# Patient Record
Sex: FEMALE | Race: BLACK OR AFRICAN AMERICAN | NOT HISPANIC OR LATINO | Employment: UNEMPLOYED | URBAN - METROPOLITAN AREA
[De-identification: names, ages, dates, MRNs, and addresses within clinical notes are randomized per-mention and may not be internally consistent; named-entity substitution may affect disease eponyms.]

---

## 2017-03-08 LAB
EXTERNAL ABO GROUPING: NORMAL
EXTERNAL ANTIBODY SCREEN: NORMAL
EXTERNAL RH FACTOR: POSITIVE

## 2017-06-05 LAB
EXTERNAL HEPATITIS B SURFACE ANTIGEN: NEGATIVE
EXTERNAL RUBELLA IGG QUANTITATION: NORMAL
EXTERNAL SYPHILIS RPR SCREEN: NORMAL

## 2017-06-06 LAB — EXTERNAL SYPHILIS RPR SCREEN: NORMAL

## 2017-09-07 LAB — EXTERNAL HIV-1 ANTIBODY: NON REACTIVE

## 2017-10-05 LAB — EXTERNAL SYPHILIS RPR SCREEN: NORMAL

## 2017-10-20 ENCOUNTER — HOSPITAL ENCOUNTER (INPATIENT)
Facility: HOSPITAL | Age: 27
LOS: 3 days | Discharge: HOME | DRG: 540 | End: 2017-10-23
Attending: OBSTETRICS & GYNECOLOGY | Admitting: OBSTETRICS & GYNECOLOGY
Payer: COMMERCIAL

## 2017-10-20 ENCOUNTER — ANESTHESIA EVENT (INPATIENT)
Dept: LABOR AND DELIVERY | Facility: HOSPITAL | Age: 27
DRG: 540 | End: 2017-10-20
Payer: COMMERCIAL

## 2017-10-20 DIAGNOSIS — Z3A.38 38 WEEKS GESTATION OF PREGNANCY: Primary | ICD-10-CM

## 2017-10-20 PROBLEM — Z3A.39 39 WEEKS GESTATION OF PREGNANCY: Status: ACTIVE | Noted: 2017-10-20

## 2017-10-20 LAB
ABO GROUP BLD: NORMAL
BASOPHILS # BLD AUTO: 0.02 THOUSANDS/ΜL (ref 0–0.1)
BASOPHILS NFR BLD AUTO: 0 % (ref 0–1)
BLD GP AB SCN SERPL QL: NEGATIVE
EOSINOPHIL # BLD AUTO: 0.06 THOUSAND/ΜL (ref 0–0.61)
EOSINOPHIL NFR BLD AUTO: 1 % (ref 0–6)
ERYTHROCYTE [DISTWIDTH] IN BLOOD BY AUTOMATED COUNT: 15.6 % (ref 11.6–15.1)
EXTERNAL GROUP B STREP ANTIGEN: NORMAL
HCT VFR BLD AUTO: 29.4 % (ref 34.8–46.1)
HGB BLD-MCNC: 9 G/DL (ref 11.5–15.4)
LYMPHOCYTES # BLD AUTO: 2.35 THOUSANDS/ΜL (ref 0.6–4.47)
LYMPHOCYTES NFR BLD AUTO: 28 % (ref 14–44)
MCH RBC QN AUTO: 25.8 PG (ref 26.8–34.3)
MCHC RBC AUTO-ENTMCNC: 30.6 G/DL (ref 31.4–37.4)
MCV RBC AUTO: 84 FL (ref 82–98)
MONOCYTES # BLD AUTO: 0.55 THOUSAND/ΜL (ref 0.17–1.22)
MONOCYTES NFR BLD AUTO: 7 % (ref 4–12)
NEUTROPHILS # BLD AUTO: 5.44 THOUSANDS/ΜL (ref 1.85–7.62)
NEUTS SEG NFR BLD AUTO: 64 % (ref 43–75)
NRBC BLD AUTO-RTO: 0 /100 WBCS
PLATELET # BLD AUTO: 239 THOUSANDS/UL (ref 149–390)
PMV BLD AUTO: 11.4 FL (ref 8.9–12.7)
RBC # BLD AUTO: 3.49 MILLION/UL (ref 3.81–5.12)
RH BLD: POSITIVE
SPECIMEN EXPIRATION DATE: NORMAL
WBC # BLD AUTO: 8.45 THOUSAND/UL (ref 4.31–10.16)

## 2017-10-20 PROCEDURE — 86850 RBC ANTIBODY SCREEN: CPT | Performed by: OBSTETRICS & GYNECOLOGY

## 2017-10-20 PROCEDURE — 86900 BLOOD TYPING SEROLOGIC ABO: CPT | Performed by: OBSTETRICS & GYNECOLOGY

## 2017-10-20 PROCEDURE — 86901 BLOOD TYPING SEROLOGIC RH(D): CPT | Performed by: OBSTETRICS & GYNECOLOGY

## 2017-10-20 PROCEDURE — 86923 COMPATIBILITY TEST ELECTRIC: CPT

## 2017-10-20 PROCEDURE — 85025 COMPLETE CBC W/AUTO DIFF WBC: CPT | Performed by: OBSTETRICS & GYNECOLOGY

## 2017-10-20 PROCEDURE — 86592 SYPHILIS TEST NON-TREP QUAL: CPT | Performed by: OBSTETRICS & GYNECOLOGY

## 2017-10-20 RX ORDER — DIPHENHYDRAMINE HYDROCHLORIDE 50 MG/ML
25 INJECTION INTRAMUSCULAR; INTRAVENOUS EVERY 6 HOURS PRN
Status: DISCONTINUED | OUTPATIENT
Start: 2017-10-20 | End: 2017-10-21

## 2017-10-20 RX ORDER — ONDANSETRON 2 MG/ML
4 INJECTION INTRAMUSCULAR; INTRAVENOUS EVERY 4 HOURS PRN
Status: DISCONTINUED | OUTPATIENT
Start: 2017-10-20 | End: 2017-10-21

## 2017-10-20 RX ORDER — BUPIVACAINE HYDROCHLORIDE 2.5 MG/ML
INJECTION, SOLUTION INFILTRATION; PERINEURAL AS NEEDED
Status: DISCONTINUED | OUTPATIENT
Start: 2017-10-20 | End: 2017-10-21 | Stop reason: SURG

## 2017-10-20 RX ORDER — FENTANYL CITRATE 50 UG/ML
INJECTION, SOLUTION INTRAMUSCULAR; INTRAVENOUS AS NEEDED
Status: DISCONTINUED | OUTPATIENT
Start: 2017-10-20 | End: 2017-10-21 | Stop reason: SURG

## 2017-10-20 RX ORDER — FENTANYL CITRATE 50 UG/ML
INJECTION, SOLUTION INTRAMUSCULAR; INTRAVENOUS
Status: COMPLETED
Start: 2017-10-20 | End: 2017-10-21

## 2017-10-20 RX ORDER — NALBUPHINE HCL 10 MG/ML
10 AMPUL (ML) INJECTION ONCE
Status: COMPLETED | OUTPATIENT
Start: 2017-10-20 | End: 2017-10-20

## 2017-10-20 RX ORDER — SODIUM CHLORIDE, SODIUM LACTATE, POTASSIUM CHLORIDE, CALCIUM CHLORIDE 600; 310; 30; 20 MG/100ML; MG/100ML; MG/100ML; MG/100ML
125 INJECTION, SOLUTION INTRAVENOUS CONTINUOUS
Status: DISCONTINUED | OUTPATIENT
Start: 2017-10-20 | End: 2017-10-23 | Stop reason: HOSPADM

## 2017-10-20 RX ORDER — METOCLOPRAMIDE HYDROCHLORIDE 5 MG/ML
5 INJECTION INTRAMUSCULAR; INTRAVENOUS EVERY 6 HOURS PRN
Status: DISCONTINUED | OUTPATIENT
Start: 2017-10-20 | End: 2017-10-21

## 2017-10-20 RX ADMIN — Medication: at 23:15

## 2017-10-20 RX ADMIN — SODIUM CHLORIDE, SODIUM LACTATE, POTASSIUM CHLORIDE, AND CALCIUM CHLORIDE 999 ML/HR: .6; .31; .03; .02 INJECTION, SOLUTION INTRAVENOUS at 21:40

## 2017-10-20 RX ADMIN — FENTANYL CITRATE 10 MCG: 50 INJECTION, SOLUTION INTRAMUSCULAR; INTRAVENOUS at 23:48

## 2017-10-20 RX ADMIN — SODIUM CHLORIDE, SODIUM LACTATE, POTASSIUM CHLORIDE, AND CALCIUM CHLORIDE 125 ML/HR: .6; .31; .03; .02 INJECTION, SOLUTION INTRAVENOUS at 22:51

## 2017-10-20 RX ADMIN — NALBUPHINE HYDROCHLORIDE 10 MG: 10 INJECTION, SOLUTION INTRAMUSCULAR; INTRAVENOUS; SUBCUTANEOUS at 22:08

## 2017-10-20 RX ADMIN — BUPIVACAINE HYDROCHLORIDE 1 ML: 2.5 INJECTION, SOLUTION INFILTRATION; PERINEURAL at 23:48

## 2017-10-21 PROBLEM — Z98.891 STATUS POST PRIMARY LOW TRANSVERSE CESAREAN SECTION: Status: ACTIVE | Noted: 2017-10-20

## 2017-10-21 LAB
AMPHETAMINES SERPL QL SCN: NEGATIVE
BARBITURATES UR QL: NEGATIVE
BASE EXCESS BLDCOA CALC-SCNC: -11 MMOL/L (ref 3–11)
BASE EXCESS BLDCOV CALC-SCNC: -6.6 MMOL/L (ref 1–9)
BASOPHILS # BLD AUTO: 0.01 THOUSANDS/ΜL (ref 0–0.1)
BASOPHILS NFR BLD AUTO: 0 % (ref 0–1)
BENZODIAZ UR QL: NEGATIVE
COCAINE UR QL: NEGATIVE
EOSINOPHIL # BLD AUTO: 0.01 THOUSAND/ΜL (ref 0–0.61)
EOSINOPHIL NFR BLD AUTO: 0 % (ref 0–6)
ERYTHROCYTE [DISTWIDTH] IN BLOOD BY AUTOMATED COUNT: 15.4 % (ref 11.6–15.1)
GLUCOSE SERPL-MCNC: 71 MG/DL (ref 65–140)
HCO3 BLDCOA-SCNC: 16.8 MMOL/L (ref 17.3–27.3)
HCO3 BLDCOV-SCNC: 20.9 MMOL/L (ref 12.2–28.6)
HCT VFR BLD AUTO: 27.8 % (ref 34.8–46.1)
HGB BLD-MCNC: 8.7 G/DL (ref 11.5–15.4)
LYMPHOCYTES # BLD AUTO: 0.88 THOUSANDS/ΜL (ref 0.6–4.47)
LYMPHOCYTES NFR BLD AUTO: 7 % (ref 14–44)
MCH RBC QN AUTO: 26.2 PG (ref 26.8–34.3)
MCHC RBC AUTO-ENTMCNC: 31.3 G/DL (ref 31.4–37.4)
MCV RBC AUTO: 84 FL (ref 82–98)
METHADONE UR QL: NEGATIVE
MONOCYTES # BLD AUTO: 0.92 THOUSAND/ΜL (ref 0.17–1.22)
MONOCYTES NFR BLD AUTO: 8 % (ref 4–12)
NEUTROPHILS # BLD AUTO: 10.28 THOUSANDS/ΜL (ref 1.85–7.62)
NEUTS SEG NFR BLD AUTO: 85 % (ref 43–75)
NRBC BLD AUTO-RTO: 0 /100 WBCS
O2 CT VFR BLDCOA CALC: 3.2 ML/DL
OPIATES UR QL SCN: NEGATIVE
OXYHGB MFR BLDCOA: 17.8 %
OXYHGB MFR BLDCOV: 22.1 %
PCO2 BLDCOA: 45 MM[HG] (ref 30–60)
PCO2 BLDCOV: 50.2 MM HG (ref 27–43)
PCP UR QL: NEGATIVE
PH BLDCOA: 7.19 [PH] (ref 7.23–7.43)
PH BLDCOV: 7.24 [PH] (ref 7.19–7.49)
PLATELET # BLD AUTO: 183 THOUSANDS/UL (ref 149–390)
PMV BLD AUTO: 10.6 FL (ref 8.9–12.7)
PO2 BLDCOA: 13.9 MM HG (ref 5–25)
PO2 BLDCOV: 14.2 MM HG (ref 15–45)
RBC # BLD AUTO: 3.32 MILLION/UL (ref 3.81–5.12)
SAO2 % BLDCOV: 4 ML/DL
THC UR QL: NEGATIVE
WBC # BLD AUTO: 12.14 THOUSAND/UL (ref 4.31–10.16)

## 2017-10-21 PROCEDURE — 85025 COMPLETE CBC W/AUTO DIFF WBC: CPT | Performed by: OBSTETRICS & GYNECOLOGY

## 2017-10-21 PROCEDURE — 88307 TISSUE EXAM BY PATHOLOGIST: CPT | Performed by: OBSTETRICS & GYNECOLOGY

## 2017-10-21 PROCEDURE — 4A1HXCZ MONITORING OF PRODUCTS OF CONCEPTION, CARDIAC RATE, EXTERNAL APPROACH: ICD-10-PCS | Performed by: OBSTETRICS & GYNECOLOGY

## 2017-10-21 PROCEDURE — 82805 BLOOD GASES W/O2 SATURATION: CPT | Performed by: OBSTETRICS & GYNECOLOGY

## 2017-10-21 PROCEDURE — 82948 REAGENT STRIP/BLOOD GLUCOSE: CPT

## 2017-10-21 PROCEDURE — 80307 DRUG TEST PRSMV CHEM ANLYZR: CPT | Performed by: OBSTETRICS & GYNECOLOGY

## 2017-10-21 RX ORDER — CALCIUM CARBONATE 200(500)MG
1000 TABLET,CHEWABLE ORAL DAILY PRN
Status: DISCONTINUED | OUTPATIENT
Start: 2017-10-21 | End: 2017-10-23 | Stop reason: HOSPADM

## 2017-10-21 RX ORDER — ONDANSETRON 2 MG/ML
4 INJECTION INTRAMUSCULAR; INTRAVENOUS EVERY 8 HOURS PRN
Status: DISCONTINUED | OUTPATIENT
Start: 2017-10-22 | End: 2017-10-23 | Stop reason: HOSPADM

## 2017-10-21 RX ORDER — DIAPER,BRIEF,INFANT-TODD,DISP
1 EACH MISCELLANEOUS DAILY PRN
Status: DISCONTINUED | OUTPATIENT
Start: 2017-10-21 | End: 2017-10-23 | Stop reason: HOSPADM

## 2017-10-21 RX ORDER — MIDAZOLAM HYDROCHLORIDE 1 MG/ML
INJECTION INTRAMUSCULAR; INTRAVENOUS AS NEEDED
Status: DISCONTINUED | OUTPATIENT
Start: 2017-10-21 | End: 2017-10-21 | Stop reason: SURG

## 2017-10-21 RX ORDER — SODIUM CHLORIDE, SODIUM LACTATE, POTASSIUM CHLORIDE, CALCIUM CHLORIDE 600; 310; 30; 20 MG/100ML; MG/100ML; MG/100ML; MG/100ML
125 INJECTION, SOLUTION INTRAVENOUS CONTINUOUS
Status: DISCONTINUED | OUTPATIENT
Start: 2017-10-21 | End: 2017-10-23 | Stop reason: HOSPADM

## 2017-10-21 RX ORDER — FENTANYL CITRATE/PF 50 MCG/ML
25 SYRINGE (ML) INJECTION
Status: COMPLETED | OUTPATIENT
Start: 2017-10-21 | End: 2017-10-21

## 2017-10-21 RX ORDER — CHLOROPROCAINE HYDROCHLORIDE 30 MG/ML
INJECTION, SOLUTION EPIDURAL; INFILTRATION; INTRACAUDAL; PERINEURAL AS NEEDED
Status: DISCONTINUED | OUTPATIENT
Start: 2017-10-21 | End: 2017-10-21 | Stop reason: SURG

## 2017-10-21 RX ORDER — OXYCODONE HYDROCHLORIDE AND ACETAMINOPHEN 5; 325 MG/1; MG/1
1 TABLET ORAL EVERY 4 HOURS PRN
Status: DISCONTINUED | OUTPATIENT
Start: 2017-10-22 | End: 2017-10-23 | Stop reason: HOSPADM

## 2017-10-21 RX ORDER — OXYTOCIN/RINGER'S LACTATE 30/500 ML
PLASTIC BAG, INJECTION (ML) INTRAVENOUS
Status: COMPLETED
Start: 2017-10-21 | End: 2017-10-21

## 2017-10-21 RX ORDER — METOCLOPRAMIDE HYDROCHLORIDE 5 MG/ML
5 INJECTION INTRAMUSCULAR; INTRAVENOUS EVERY 6 HOURS PRN
Status: ACTIVE | OUTPATIENT
Start: 2017-10-21 | End: 2017-10-22

## 2017-10-21 RX ORDER — ACETAMINOPHEN 325 MG/1
650 TABLET ORAL EVERY 6 HOURS PRN
Status: DISCONTINUED | OUTPATIENT
Start: 2017-10-21 | End: 2017-10-23 | Stop reason: HOSPADM

## 2017-10-21 RX ORDER — SIMETHICONE 80 MG
80 TABLET,CHEWABLE ORAL 4 TIMES DAILY PRN
Status: DISCONTINUED | OUTPATIENT
Start: 2017-10-21 | End: 2017-10-23 | Stop reason: HOSPADM

## 2017-10-21 RX ORDER — KETOROLAC TROMETHAMINE 30 MG/ML
30 INJECTION, SOLUTION INTRAMUSCULAR; INTRAVENOUS ONCE AS NEEDED
Status: COMPLETED | OUTPATIENT
Start: 2017-10-21 | End: 2017-10-21

## 2017-10-21 RX ORDER — OXYTOCIN 10 [USP'U]/ML
INJECTION, SOLUTION INTRAMUSCULAR; INTRAVENOUS AS NEEDED
Status: DISCONTINUED | OUTPATIENT
Start: 2017-10-21 | End: 2017-10-21 | Stop reason: SURG

## 2017-10-21 RX ORDER — OXYTOCIN/RINGER'S LACTATE 30/500 ML
1-30 PLASTIC BAG, INJECTION (ML) INTRAVENOUS
Status: DISCONTINUED | OUTPATIENT
Start: 2017-10-21 | End: 2017-10-23 | Stop reason: HOSPADM

## 2017-10-21 RX ORDER — OXYCODONE HYDROCHLORIDE AND ACETAMINOPHEN 5; 325 MG/1; MG/1
2 TABLET ORAL EVERY 4 HOURS PRN
Status: DISCONTINUED | OUTPATIENT
Start: 2017-10-22 | End: 2017-10-23 | Stop reason: HOSPADM

## 2017-10-21 RX ORDER — DIPHENHYDRAMINE HYDROCHLORIDE 50 MG/ML
25 INJECTION INTRAMUSCULAR; INTRAVENOUS EVERY 6 HOURS PRN
Status: DISCONTINUED | OUTPATIENT
Start: 2017-10-21 | End: 2017-10-23 | Stop reason: HOSPADM

## 2017-10-21 RX ORDER — NALBUPHINE HCL 10 MG/ML
5 AMPUL (ML) INJECTION
Status: DISPENSED | OUTPATIENT
Start: 2017-10-21 | End: 2017-10-22

## 2017-10-21 RX ORDER — DEXAMETHASONE SODIUM PHOSPHATE 4 MG/ML
8 INJECTION, SOLUTION INTRA-ARTICULAR; INTRALESIONAL; INTRAMUSCULAR; INTRAVENOUS; SOFT TISSUE ONCE AS NEEDED
Status: ACTIVE | OUTPATIENT
Start: 2017-10-21 | End: 2017-10-22

## 2017-10-21 RX ORDER — DOCUSATE SODIUM 100 MG/1
100 CAPSULE, LIQUID FILLED ORAL 2 TIMES DAILY
Status: DISCONTINUED | OUTPATIENT
Start: 2017-10-21 | End: 2017-10-23 | Stop reason: HOSPADM

## 2017-10-21 RX ORDER — OXYTOCIN/RINGER'S LACTATE 30/500 ML
62.5 PLASTIC BAG, INJECTION (ML) INTRAVENOUS CONTINUOUS
Status: DISCONTINUED | OUTPATIENT
Start: 2017-10-21 | End: 2017-10-23 | Stop reason: HOSPADM

## 2017-10-21 RX ORDER — SODIUM CHLORIDE, SODIUM LACTATE, POTASSIUM CHLORIDE, CALCIUM CHLORIDE 600; 310; 30; 20 MG/100ML; MG/100ML; MG/100ML; MG/100ML
INJECTION, SOLUTION INTRAVENOUS CONTINUOUS PRN
Status: DISCONTINUED | OUTPATIENT
Start: 2017-10-21 | End: 2017-10-21 | Stop reason: SURG

## 2017-10-21 RX ORDER — IBUPROFEN 600 MG/1
600 TABLET ORAL EVERY 6 HOURS PRN
Status: DISCONTINUED | OUTPATIENT
Start: 2017-10-21 | End: 2017-10-23 | Stop reason: HOSPADM

## 2017-10-21 RX ORDER — TERBUTALINE SULFATE 1 MG/ML
INJECTION, SOLUTION SUBCUTANEOUS
Status: COMPLETED
Start: 2017-10-21 | End: 2017-10-21

## 2017-10-21 RX ORDER — CEFAZOLIN SODIUM 1 G/3ML
INJECTION, POWDER, FOR SOLUTION INTRAMUSCULAR; INTRAVENOUS AS NEEDED
Status: DISCONTINUED | OUTPATIENT
Start: 2017-10-21 | End: 2017-10-21 | Stop reason: SURG

## 2017-10-21 RX ORDER — MORPHINE SULFATE 1 MG/ML
INJECTION, SOLUTION EPIDURAL; INTRATHECAL; INTRAVENOUS AS NEEDED
Status: DISCONTINUED | OUTPATIENT
Start: 2017-10-21 | End: 2017-10-21 | Stop reason: SURG

## 2017-10-21 RX ORDER — KETOROLAC TROMETHAMINE 30 MG/ML
30 INJECTION, SOLUTION INTRAMUSCULAR; INTRAVENOUS EVERY 6 HOURS PRN
Status: DISPENSED | OUTPATIENT
Start: 2017-10-21 | End: 2017-10-22

## 2017-10-21 RX ORDER — ONDANSETRON 2 MG/ML
4 INJECTION INTRAMUSCULAR; INTRAVENOUS ONCE AS NEEDED
Status: DISCONTINUED | OUTPATIENT
Start: 2017-10-21 | End: 2017-10-23 | Stop reason: HOSPADM

## 2017-10-21 RX ORDER — METOCLOPRAMIDE HYDROCHLORIDE 5 MG/ML
10 INJECTION INTRAMUSCULAR; INTRAVENOUS ONCE AS NEEDED
Status: DISCONTINUED | OUTPATIENT
Start: 2017-10-21 | End: 2017-10-23 | Stop reason: HOSPADM

## 2017-10-21 RX ORDER — ONDANSETRON 2 MG/ML
4 INJECTION INTRAMUSCULAR; INTRAVENOUS EVERY 4 HOURS PRN
Status: ACTIVE | OUTPATIENT
Start: 2017-10-21 | End: 2017-10-22

## 2017-10-21 RX ADMIN — SODIUM CHLORIDE, SODIUM LACTATE, POTASSIUM CHLORIDE, AND CALCIUM CHLORIDE 125 ML/HR: .6; .31; .03; .02 INJECTION, SOLUTION INTRAVENOUS at 20:03

## 2017-10-21 RX ADMIN — TERBUTALINE SULFATE 0.25 MG: 1 INJECTION SUBCUTANEOUS at 03:02

## 2017-10-21 RX ADMIN — Medication 2 MILLI-UNITS/MIN: at 07:56

## 2017-10-21 RX ADMIN — SODIUM CHLORIDE, SODIUM LACTATE, POTASSIUM CHLORIDE, AND CALCIUM CHLORIDE: .6; .31; .03; .02 INJECTION, SOLUTION INTRAVENOUS at 12:43

## 2017-10-21 RX ADMIN — HYDROMORPHONE HYDROCHLORIDE 0.5 MG: 1 INJECTION, SOLUTION INTRAMUSCULAR; INTRAVENOUS; SUBCUTANEOUS at 17:42

## 2017-10-21 RX ADMIN — ONDANSETRON 4 MG: 2 INJECTION INTRAMUSCULAR; INTRAVENOUS at 00:33

## 2017-10-21 RX ADMIN — FENTANYL CITRATE 25 MCG: 50 INJECTION INTRAMUSCULAR; INTRAVENOUS at 14:25

## 2017-10-21 RX ADMIN — SODIUM CHLORIDE, SODIUM LACTATE, POTASSIUM CHLORIDE, AND CALCIUM CHLORIDE 125 ML/HR: .6; .31; .03; .02 INJECTION, SOLUTION INTRAVENOUS at 04:48

## 2017-10-21 RX ADMIN — MIDAZOLAM HYDROCHLORIDE 4 MG: 1 INJECTION, SOLUTION INTRAMUSCULAR; INTRAVENOUS at 12:55

## 2017-10-21 RX ADMIN — FENTANYL CITRATE 25 MCG: 50 INJECTION INTRAMUSCULAR; INTRAVENOUS at 15:12

## 2017-10-21 RX ADMIN — DIPHENHYDRAMINE HYDROCHLORIDE 25 MG: 50 INJECTION, SOLUTION INTRAMUSCULAR; INTRAVENOUS at 20:04

## 2017-10-21 RX ADMIN — CEFAZOLIN 2000 MG: 1 INJECTION, POWDER, FOR SOLUTION INTRAVENOUS at 12:50

## 2017-10-21 RX ADMIN — HYDROMORPHONE HYDROCHLORIDE 0.5 MG: 1 INJECTION, SOLUTION INTRAMUSCULAR; INTRAVENOUS; SUBCUTANEOUS at 15:32

## 2017-10-21 RX ADMIN — AZITHROMYCIN MONOHYDRATE 500 MG: 500 INJECTION, POWDER, LYOPHILIZED, FOR SOLUTION INTRAVENOUS at 12:50

## 2017-10-21 RX ADMIN — OXYTOCIN 30 UNITS: 10 INJECTION, SOLUTION INTRAMUSCULAR; INTRAVENOUS at 12:55

## 2017-10-21 RX ADMIN — CHLOROPROCAINE HYDROCHLORIDE 30 ML: 30 INJECTION, SOLUTION EPIDURAL; INFILTRATION at 12:45

## 2017-10-21 RX ADMIN — KETOROLAC TROMETHAMINE 30 MG: 30 INJECTION, SOLUTION INTRAMUSCULAR at 20:03

## 2017-10-21 RX ADMIN — MORPHINE SULFATE 3 MG: 1 INJECTION, SOLUTION EPIDURAL; INTRATHECAL; INTRAVENOUS at 13:06

## 2017-10-21 RX ADMIN — FENTANYL CITRATE 25 MCG: 50 INJECTION INTRAMUSCULAR; INTRAVENOUS at 14:54

## 2017-10-21 RX ADMIN — KETOROLAC TROMETHAMINE 30 MG: 30 INJECTION, SOLUTION INTRAMUSCULAR at 14:08

## 2017-10-21 RX ADMIN — FENTANYL CITRATE 25 MCG: 50 INJECTION INTRAMUSCULAR; INTRAVENOUS at 14:36

## 2017-10-21 RX ADMIN — NALBUPHINE HYDROCHLORIDE 5 MG: 10 INJECTION, SOLUTION INTRAMUSCULAR; INTRAVENOUS; SUBCUTANEOUS at 17:20

## 2017-10-21 RX ADMIN — METOCLOPRAMIDE 5 MG: 5 INJECTION, SOLUTION INTRAMUSCULAR; INTRAVENOUS at 02:17

## 2017-10-21 RX ADMIN — HYDROMORPHONE HYDROCHLORIDE 0.5 MG: 1 INJECTION, SOLUTION INTRAMUSCULAR; INTRAVENOUS; SUBCUTANEOUS at 22:32

## 2017-10-21 NOTE — OP NOTE
OPERATIVE REPORT  PATIENT NAME: Gabriela Molina    :  1990  MRN: [id number]  Pt Location: [facility] OR ROOM 02    SURGERY DATE: 10/21/2017    Surgeon(s) and Role:     * Suzan Contreras MD - Primary     * Manuel Lemos MD - Assisting     * Kathryn Sharp - Assisting    Preop Diagnosis:  Non-reassuring fetal heart tone    Post-Op Diagnosis Codes:     * Non-reassuring fetal status, delivered, current hospitalization [O75 89]    Procedure(s) (LRB):   SECTION () (Bilateral)    Specimen(s):  ID Type Source Tests Collected by Time Destination   1 :  Tissue (Placenta on Hold) OB Only Placenta TISSUE EXAM OB (PLACENTA) ONLY, PLACENTA IN STORAGE Suzan Contreras MD 10/21/2017 1256    A :  Cord Blood Cord BLOOD GAS, VENOUS, CORD, BLOOD GAS, ARTERIAL, CORD Suzan Contreras MD 10/21/2017 1256        Estimated Blood Loss:   800 mL    Drains:  Urethral Catheter Latex 16 Fr  (Active)   Site Assessment Clean;Skin intact 10/21/2017 12:54 PM   Collection Container Standard drainage bag 10/21/2017 12:54 PM   Securement Method Securing device (Describe) 10/21/2017  7:15 AM   Output (mL) 120 mL 10/21/2017  9:00 AM   Number of days: 0       Anesthesia Type:   * No anesthesia type entered *    Operative Indications:  Non-reassuring fetal heart tone      Operative Findings:  Viable male  @ 2927, Apgars 3/8  Spontaneous intact placenta with 3VC @ 1255  Normal uterus, right tube and ovary, left tube missing c/w prior ectopic  No intra-abdominal adhesions    Complications:   None    Procedure and Technique:    The patient was taken to the operating room where a time out was performed to confirm correct patient and correct procedure  Epidural anesthesia was adequately established  Prior to incision 2gm Ancef and 500mg Azithromycin was given for preoperative prophylaxis  The patient was then placed in a supine position with a left lateral tilt     A lion catheter had been placed during labor, urine was noted to be slightly blood tinged pre-operatively  Fetal heart tones were still non-reassuring  The patient was then prepped and draped in the usual sterile fashion  Anesthesia was tested using an allis clamp and noted to be adequate  A Pfannenstiel incision was made and carried down through the underlying subcutaneous tissue to the fascia using a scalpel  Rectus fascia was then incised  We then proceeded in Arsenio-Veras fashion  All anatomic layers were well-demarcated  The rectus muscles were  and the peritoneum was identified, entered, and extended longitudinally with blunt dissection  The bladder blade was inserted  A low transverse uterine incision was made with the scalpel and extended laterally with blunt dissection  The amnion was entered bluntly  The fetal head was palpated, elevated, and delivered through the uterine incision followed by the body without difficulty  The umbilical cord was clamped and cut  The infant was handed off to the  providers  Arterial and venous cord gases, cord blood, and a segment of umbilical cord were obtained for evaluation  The placenta delivered spontaneously with uterine fundal massage and appeared normal  The uterus was exteriorized and curretted with a moist lap sponge  The uterine incision was closed with a running locked suture of 0 Monocryl  A second layer of the same suture was used to imbricate the first   Hemostasis was noted to be excellent  Normal saline solution was used to irrigate the posterior culdesac  The uterus was returned to the abdomen  The gutters were inspected and cleared of all clots and debris with irrigation and moist lap sponges  One additional figure of eight stitch was placed in the right angle of the uterine incision using 0-vicryl  The fascia was closed with a running suture of 0 Vicryl  The skin incision was closed with 4-0 Vicryl  Good hemostasis was noted    Histocryl was  placed sterilely placed on top of the skin incision  All needle, sponge, and instrument counts were noted to be correct x 2 at the end of the procedure  The patient was then cleansed and transferred to the recovery room  Overall, the patient tolerated the procedure well and is currently in stable condition in the PACU with her   I was present and participated in the entire procedure         SIGNATURE: Kim Lopez MD  DATE: 2017  TIME: 1:47 PM

## 2017-10-21 NOTE — DISCHARGE SUMMARY
Discharge Summary - OB/GYN   Fidelia Peralta 33 y o  female MRN: [id number]  Unit/Bed#: LD PACU- Encounter: [id number]      Admission Date: 10/20/2017     Discharge Date: 10/23/17    Admitting Diagnosis:   1  Pregnancy at 39w6d  2  GBS unknown  3  Poor prenatal care  4  Primary low-transverse  section    Discharge Diagnosis:   Same, delivered      Procedures: primary  section, low transverse incision    Attending: LEWIS Leigh MD    Hospital Course:     Fidelia Peralta is a 33 y o  [id number] at 39w6d wks who was initially admitted for labor  Patient arrested dilation at 8 cm  Baby did not tolerate Pitocin to augment  Over time fetal heart tracing became persisting category 2 with repetitive deep late decelerations  So the decision was made to proceed with a primary low-transverse  section  She delivered a viable male  on 10/21/017 at   Weight 3685g via primary  section, low transverse incision  Apgars were 3 (1 min) and 8 (5 min)   was transferred to  nursery  Patient tolerated the procedure well and was transferred to recovery in stable condition  Her post-operative course was uncomplicated  Preoperative hemaglobin was 9 0, postoperative was 7 8  Her postoperative pain was well controlled with oral analgesics  On day of discharge, she was ambulating and able to reasonably perform all ADLs  She was voiding and had appropriate bowel function  Pain was well controlled  She was discharged home on post-operative day #2 without complications  Patient was instructed to follow up with her OB as an outpatient and was given appropriate warnings to call provider if she develops signs of infection or uncontrolled pain  Complications: none apparent    Condition at discharge: stable     Discharge instructions/Information to patient and family:   See after visit summary for information provided to patient and family        Provisions for Follow-Up Care:  See after visit summary for information related to follow-up care and any pertinent home health orders  Disposition: Home    Planned Readmission: No    Discharge Medications: For a complete list of the patient's medications, please refer to her med rec          Marcus Valenzuela DO  OB/GYN, PGY2  10/23/2017, 6:26 AM

## 2017-10-21 NOTE — PROGRESS NOTES
IUPC was placed as part of the huddle discussion to measure adequancy of contractions and allow for the possibility of amnioinfusion if necessary  Growth scan performed, baby measuring approximately 5 pounds 7 ounces  Patient's largest baby was 8 pounds in   Discussed the possibility of  section secondary to arrest of dilation as well as fetal intolerance to labor as we had to shut the pitocin off since baby could not tolerate a pitocin of 2mU  Will continue expectant management for now

## 2017-10-21 NOTE — ANESTHESIA PROCEDURE NOTES
CSE Block    Patient location during procedure: OB  Start time: 10/20/2017 11:48 PM  Reason for block: procedure for pain and at surgeon's request  Staffing  Anesthesiologist: Steve Strong  Performed: anesthesiologist   Preanesthetic Checklist  Completed: patient identified, site marked, surgical consent, pre-op evaluation, timeout performed, IV checked, risks and benefits discussed and monitors and equipment checked  CSE  Patient position: sitting  Prep: ChloraPrep  Patient monitoring: cardiac monitor and continuous pulse ox  Approach: midline  Spinal Needle  Needle type: pencil-tip   Needle gauge: 27 G  Needle length: 10 cm  Epidural Needle  Injection technique: EFRAIN saline  Needle type: Tuohy   Needle gauge: 18 G  Location: lumbar (1-5)  Catheter  Catheter type: side hole  Catheter size: 20 G  Catheter at skin depth: 12 cm  Test dose: negative  AssessmentInjection Assessment:  negative aspiration for heme, no paresthesia on injection, positive aspiration for clear CSF and no pain on injection

## 2017-10-21 NOTE — H&P
H&P Exam - Obstetrics   Glen Alcantara 33 y o  female MRN: [id number]  Unit/Bed#: -01 Encounter: [id number]      History of Present Illness     Chief Complaint: Active labor    HPI:  Glen Alcantara is a 33 y o  [id number] female with an VIOLET of 10/22/2017, by Patient Reported at 39w5d weeks gestation who is being admitted for Active labor  The patient presented to the emergency room by ambulance complaining of contractions  She reports she has been previously seen at his hospital and was sent home the day prior  She had no complaints of leaking of fluid vaginal bleeding  She reported good fetal movement  Patient reported that she was getting her care at [facility] in [place], [place]  She had a history of 5 previous vaginal deliveries and and exploratory laparotomy for a left ectopic pregnancy  She is GBS unknown  Contractions: every 4 minutes with increasing intensity  Leakage of fluid: None  Bleeding: None  Fetal movement: present  The patient was brought to the trauma [place] and was evaluated  She was noted to be 5 cm dilated, 70% effaced, and -2 station  She was then moved up to the labor floor she then spontaneously ruptured for clear fluid and was admitted for active labor  PREGNANCY COMPLICATIONS:  Patient unknown to the [facility]  OB History    Para Term  AB Living   9 5 5   3 5   SAB TAB Ectopic Multiple Live Births   1   1   5      # Outcome Date GA Lbr Karlos/2nd Weight Sex Delivery Anes PTL Lv   9 Current            8 Ectopic 16           7 Term 11/10/13 40w0d    Vag-Spont   AFIA   6 Term 01/13/10 40w0d    Vag-Spont   AFIA   5 Term 08 40w0d    Vag-Spont   AFIA   4 Term 10/20/06 40w0d    Vag-Spont   AFIA   3 Term      Vag-Spont   AFIA   2 AB            1 SAB               Obstetric Comments   Ex lap     Review of Systems   Constitutional: Negative for chills and fever  Respiratory: Negative for shortness of breath      Cardiovascular: Negative for chest pain  Gastrointestinal: Positive for abdominal pain  Negative for nausea and vomiting  Historical Information   Past Medical History:   Diagnosis Date    Psychiatric disorder     bipolar    Varicella     Child Hx     Past Surgical History:   Procedure Laterality Date    ME LAP,DIAGNOSTIC ABDOMEN N/A 12/6/2016    Procedure: EXPLORATORY LAPAROTOMY, LEFT SALPINGECTOMY;  Surgeon: Romana Cohn, MD;  Location: [facility];  Service: Gynecology     Social History   History   Alcohol Use No     History   Drug Use No     History   Smoking Status    Former Smoker    Packs/day: 0 00   Smokeless Tobacco    Never Used     Family History: non-contributory    Meds/Allergies      Prescriptions Prior to Admission   Medication    Citalopram Hydrobromide (CELEXA PO)    ferrous sulfate 325 (65 Fe) mg tablet    ibuprofen (MOTRIN) 600 mg tablet      No Known Allergies    OBJECTIVE:    Vitals: Blood pressure 132/70, pulse 81, temperature 98 3 °F (36 8 °C), temperature source Oral, resp  rate 20, height 5' 2" (1 575 m), weight 86 2 kg (190 lb), SpO2 100 %, currently breastfeeding  Body mass index is 34 75 kg/m²  Physical Exam   Constitutional: She is oriented to person, place, and time  HENT:   Head: Normocephalic and atraumatic  Cardiovascular: Normal rate and regular rhythm  Pulmonary/Chest: Effort normal and breath sounds normal    Abdominal: There is tenderness  There is rebound  Musculoskeletal: She exhibits no edema  Neurological: She is alert and oriented to person, place, and time  Skin: Skin is warm and dry         Cervix:   Dilation: 5  Effacement (%): 70  Station: -2  Cervical Characteristics: Soft, Anterior    Fetal heart rate:   Baseline Rate: 120 bpm  Variability: Moderate 6-25 bpm  Accelerations: 15 x 15 or greater, At variable times  Decelerations: Early  FHR Category: Category I    Hood:   Contraction Frequency (minutes): 2  Contraction Duration (seconds): 40-80  Contraction Quality: Moderate    EFW: 7lbs    GBS: unknown    Prenatal Labs: labs not available for review  Invasive Devices     Peripheral Intravenous Line            Peripheral IV 10/20/17 Left Forearm 1 day          Epidural Line            Epidural Catheter 10/20/17 less than 1 day          Drain            Urethral Catheter Latex 16 Fr  less than 1 day                  Assessment/Plan     ASSESSMENT:   28yo   at 39w5d weeks gestation in labor  PLAN:  - will admit for active labor  -we will get records from [facility]  -epidural p r n   -we will expectantly manage  -continuous external fetal monitoring  -CBC, RPR, type and screen  -we will get UDS for poor prenatal care    Discussed with Dr Mariza Bose      This patient will be an INPATIENT  and I certify the anticipated length of stay is >2 Midnights      Romayne Cargo, MD   OB/Gyn PGY-4  10/21/2017 1:42 AM

## 2017-10-21 NOTE — OB LABOR/OXYTOCIN SAFETY PROGRESS
Oxytocin Safety Progress Check Note - Bakari Colunga 33 y o  female MRN: [id number]    Unit/Bed#: -01 Encounter: [id number]    Obstetric History       T5      L5     SAB0   TAB0   Ectopic1   Multiple0   Live Births5    Obstetric Comments   Ex lap     Gestational Age: 37w11d     Contraction Frequency (minutes): 2-3  Contraction Quality: Moderate  Tachysystole: No   Dilation: 8        Effacement (%): 80  Station: -1  Baseline Rate: 125 bpm  Fetal Heart Rate: 62 BPM  FHR Category: Category I     Oxytocin Safety Progress Check: Safety check completed    Notes/comments:   Pt laying in bed, comfortable with epidural  Nurse reported she was feeling more pressure   SVE unchanged with re check in 2 hrs or if pt feeling increased pressure          Lanita Sandifer, MD 10/21/2017 4:55 AM

## 2017-10-21 NOTE — PROGRESS NOTES
Recommend 1LTCS due to fetal intolerance to labor, remote from delivery  Reviewed risks, benefits, alternatives of procedure with patient and   Patient questions answered, and she is agreement with the plan

## 2017-10-21 NOTE — ANESTHESIA POSTPROCEDURE EVALUATION
Post-Op Assessment Note      CV Status:  Stable    Mental Status:  Alert and awake    Hydration Status:  Stable    PONV Controlled:  None    Airway Patency:  Patent    Post Op Vitals Reviewed: Yes          Staff: Anesthesiologist     Post-op block assessment: site cleaned and catheter intact        BP      Temp      Pulse     Resp      SpO2

## 2017-10-21 NOTE — PROGRESS NOTES
Patient is a late transfer to care at [facility] ( in labor) and her last growth scan was done in the second trimester  We did a growth scan to evaluate if the fetus was too  Large , hence causing labor dystocia  Abd U/S: 10/21/17  1  Vertex face down  2  Loose Double nuchal cord with nml umbilical dopplers  3  FL measuring in the 19 th percentile at 7 45 cm correlates to 37w5d   4  AC 33 93 cm correlates with 38 week EGA  5  BPP and HC could not be measures as head deep into the pelvic    - Base on the above measurement and exam, we do not believed that the fetus is larger than the patient's other babies  Hernesto Benton   10/21/2017     I participated in this US procedure     Face to face and floor time 15 min    Amy Massey MD

## 2017-10-21 NOTE — PLAN OF CARE
Problem: Knowledge Deficit  Goal: Verbalizes understanding of labor plan  Assess patient/family/caregiver's baseline knowledge level and ability to understand information  Provide education via patient/family/caregiver's preferred learning method at appropriate level of understanding  1  Provide teaching at level of understanding  2  Provide teaching via preferred learning method(s)  Outcome: Progressing    Goal: Patient/family/caregiver demonstrates understanding of disease process, treatment plan, medications, and discharge instructions  Complete learning assessment and assess knowledge base    Interventions:  - Provide teaching at level of understanding  - Provide teaching via preferred learning methods   Outcome: Progressing      Problem: PAIN - ADULT  Goal: Verbalizes/displays adequate comfort level or baseline comfort level  Interventions:  - Encourage patient to monitor pain and request assistance  - Assess pain using appropriate pain scale  - Administer analgesics based on type and severity of pain and evaluate response  - Implement non-pharmacological measures as appropriate and evaluate response  - Consider cultural and social influences on pain and pain management  - Notify physician/advanced practitioner if interventions unsuccessful or patient reports new pain   Outcome: Progressing      Problem: INFECTION - ADULT  Goal: Absence or prevention of progression during hospitalization  INTERVENTIONS:  - Assess and monitor for signs and symptoms of infection  - Monitor lab/diagnostic results  - Monitor all insertion sites, i e  indwelling lines, tubes, and drains  - Monitor endotracheal (as able) and nasal secretions for changes in amount and color  - [place] appropriate cooling/warming therapies per order  - Administer medications as ordered  - Instruct and encourage patient and family to use good hand hygiene technique  - Identify and instruct in appropriate isolation precautions for identified infection/condition   Outcome: Progressing    Goal: Absence of fever/infection during neutropenic period  INTERVENTIONS:  - Monitor WBC  - Implement neutropenic guidelines   Outcome: Progressing      Problem: SAFETY ADULT  Goal: Patient will remain free of falls  INTERVENTIONS:  - Assess patient frequently for physical needs  -  Identify cognitive and physical deficits and behaviors that affect risk of falls    -  [place] fall precautions as indicated by assessment   - Educate patient/family on patient safety including physical limitations  - Instruct patient to call for assistance with activity based on assessment  - Modify environment to reduce risk of injury  - Consider OT/PT consult to assist with strengthening/mobility   Outcome: Progressing    Goal: Maintain or return to baseline ADL function  INTERVENTIONS:  -  Assess patient's ability to carry out ADLs; assess patient's baseline for ADL function and identify physical deficits which impact ability to perform ADLs (bathing, care of mouth/teeth, toileting, grooming, dressing, etc )  - Assess/evaluate cause of self-care deficits   - Assess range of motion  - Assess patient's mobility; develop plan if impaired  - Assess patient's need for assistive devices and provide as appropriate  - Encourage maximum independence but intervene and supervise when necessary  ¯ Involve family in performance of ADLs  ¯ Assess for home care needs following discharge   ¯ Request OT consult to assist with ADL evaluation and planning for discharge  ¯ Provide patient education as appropriate   Outcome: Progressing    Goal: Maintain or return mobility status to optimal level  INTERVENTIONS:  - Assess patient's baseline mobility status (ambulation, transfers, stairs, etc )    - Identify cognitive and physical deficits and behaviors that affect mobility  - Identify mobility aids required to assist with transfers and/or ambulation (gait belt, sit-to-stand, lift, walker, cane, etc )  - [place] fall precautions as indicated by assessment  - Record patient progress and toleration of activity level on Mobility SBAR; progress patient to next Phase/Stage  - Instruct patient to call for assistance with activity based on assessment  - Request Rehabilitation consult to assist with strengthening/weightbearing, etc    Outcome: Progressing      Problem: DISCHARGE PLANNING  Goal: Discharge to home or other facility with appropriate resources  INTERVENTIONS:  - Identify barriers to discharge w/patient and caregiver  - Arrange for needed discharge resources and transportation as appropriate  - Identify discharge learning needs (meds, wound care, etc )  - Arrange for interpretive services to assist at discharge as needed  - Refer to Case Management Department for coordinating discharge planning if the patient needs post-hospital services based on physician/advanced practitioner order or complex needs related to functional status, cognitive ability, or social support system   Outcome: Progressing

## 2017-10-21 NOTE — PLAN OF CARE
BIRTH - VAGINAL/ SECTION     Fetal and maternal status remain reassuring during the birth process [de-identified]     Emotionally satisfying birthing experience for mother/fetus [address] Discharge to home or other facility with appropriate resources Progressing        INFECTION - ADULT     Absence or prevention of progression during hospitalization Progressing     Absence of fever/infection during neutropenic period Progressing        Knowledge Deficit     Verbalizes understanding of labor plan Progressing     Patient/family/caregiver demonstrates understanding of disease process, treatment plan, medications, and discharge instructions Progressing        Labor & Delivery     Manages discomfort Progressing     Patient vital signs are stable Progressing        PAIN - ADULT     Verbalizes/displays adequate comfort level or baseline comfort level Progressing        SAFETY ADULT     Patient will remain free of falls Progressing     Maintain or return to baseline ADL function Progressing     Maintain or return mobility status to optimal level Progressing

## 2017-10-21 NOTE — OB LABOR/OXYTOCIN SAFETY PROGRESS
Labor Progress Note - Narayan Shelby 33 y o  female MRN: [id number]    Unit/Bed#: -01 Encounter: [id number]    Obstetric History       T5      L5     SAB0   TAB0   Ectopic1   Multiple0   Live Births5    Obstetric Comments   Ex lap     Gestational Age: 37w11d     Contraction Frequency (minutes): 5  Contraction Quality: Not applicable  Tachysystole: No   Dilation: 8        Effacement (%): 80  Station: -1  Baseline Rate: 120 bpm  Fetal Heart Rate: 62 BPM  FHR Category: Category II     Oxytocin Safety Progress Check: Safety check completed    Notes/comments:     Patient comfortable and not feeling any pressure  Cervix is unchanged, will augment with pitocin   FHT showing occasional shallow variable and early decelerations, overall reactive            Awilda Powell MD 10/21/2017 7:41 AM

## 2017-10-21 NOTE — PROGRESS NOTES
SAFETY HUDDLE    TRIGGER: Pitocin Discontinued, Recurrent Variable decels    PARTICIPANTS: Edna Walton Talmage, Male, Allen    REVIEW OF CURRENT PLAN OF CARE AND MANAGEMENT: FHT with moderate variability and accels now  Will place IUPC and perform bedside growth scan  Patient without PN care since 21 weeks, she reports this baby feels "big"    To consider retrial of pitocin augmentation v  1LTCS>    TIMELINE FOR NEXT ASSESSMENT: After growth scan    ALL PARTICIPANTS IN AGREEMENT WITH PLAN OF CARE: yes    IS PERRT REQUIRED: no

## 2017-10-21 NOTE — PLAN OF CARE
Problem: Knowledge Deficit  Goal: Verbalizes understanding of labor plan  Assess patient/family/caregiver's baseline knowledge level and ability to understand information  Provide education via patient/family/caregiver's preferred learning method at appropriate level of understanding  1  Provide teaching at level of understanding  2  Provide teaching via preferred learning method(s)  Outcome: Completed Date Met: 10/21/17    Goal: Patient/family/caregiver demonstrates understanding of disease process, treatment plan, medications, and discharge instructions  Complete learning assessment and assess knowledge base  Interventions:  - Provide teaching at level of understanding  - Provide teaching via preferred learning methods   Outcome: Completed Date Met: 10/21/17      Problem: Labor & Delivery  Goal: Manages discomfort  Assess and monitor for signs and symptoms of discomfort  Assess patient's pain level regularly and per hospital policy  Administer medications as ordered  Support use of nonpharmacological methods to help control pain such as distraction, imagery, relaxation, and application of heat and cold  Collaborate with interdisciplinary team and patient to determine appropriate pain management plan  1  Include patient in decisions related to comfort  2  Offer non-pharmacological pain management interventions  3  Report ineffective pain management to physician  Outcome: Completed Date Met: 10/21/17    Goal: Patient vital signs are stable  1  Assess vital signs - vaginal delivery     Outcome: Completed Date Met: 10/21/17      Problem: BIRTH - VAGINAL/ SECTION  Goal: Fetal and maternal status remain reassuring during the birth process  INTERVENTIONS:  - Monitor vital signs  - Monitor fetal heart rate  - Monitor uterine activity  - Monitor labor progression (vaginal delivery)  - DVT prophylaxis  - Antibiotic prophylaxis   Outcome: Completed Date Met: 10/21/17    Goal: Emotionally satisfying birthing experience for mother/fetus  Interventions:  - Assess, plan, implement and evaluate the nursing care given to the patient in labor  - Advocate the philosophy that each childbirth experience is a unique experience and support the family's chosen level of involvement and control during the labor process   - Actively participate in both the patient's and family's teaching of the birth process  - Consider cultural, Hoahaoism and age-specific factors and plan care for the patient in labor   Outcome: Completed Date Met: 10/21/17

## 2017-10-21 NOTE — OB LABOR/OXYTOCIN SAFETY PROGRESS
Labor Progress Note - Humble Garcias 33 y o  female MRN: [id number]    Unit/Bed#: -01 Encounter: [id number]    Obstetric History       T5      L5     SAB0   TAB0   Ectopic1   Multiple0   Live Births5    Obstetric Comments   Ex lap     Gestational Age: 37w11d     Contraction Frequency (minutes): 1 5-2  Contraction Quality: Moderate  Tachysystole: No   Dilation: 8        Effacement (%): 80  Station: -1  Baseline Rate: 120 bpm  Fetal Heart Rate: 62 BPM  FHR Category: Category II          Notes/comments:   Patient had prolonged deceleration for 7 minutes down to a bay of 60 beats per minute  Patient was repositioned to both left and right lateral decubitus  She was then examined and noted to be 8 cm and a large amount of fluid was lost with rupture of her 4 bag which was and did well during the cervical exam   A fetal scalp electrode was placed  Fetal heart tones had not responded to repositioning oxygen was placed and the patient was placed in all 4s  She did receive a dose of terbutaline  Heart tones then responded and return to the 120s with moderate variability  Both patient and patient's partner were informed of the deceleration and indications for potential delivery by  if this were to occur again  She was reassured that the fetal heart tones returned to a normal baseline  There is advised that we could continue expectant management of labor  She was given the opportunity ask questions        Continue expectant management  Anticipate spontaneous vaginal delivery  Dr Taryn Gallardo MD 10/21/2017 3:39 AM

## 2017-10-21 NOTE — ADDENDUM NOTE
Addendum  created 10/21/17 154 by Apurva Flores MD    Anesthesia Intra LDAs edited, LDA properties accepted

## 2017-10-21 NOTE — DISCHARGE INSTRUCTIONS
Section   WHAT YOU SHOULD KNOW:   A  delivery, or , is abdominal surgery to deliver your baby  There are many reasons you may need a   · A  may be scheduled before labor if you had a  with your last baby  It may be scheduled if your baby is not positioned normally, or you are pregnant with more than 1 baby  · Your caregiver may perform an emergency  during labor to prevent life-threatening complications for you or your baby  A  may be done if your cervix does not dilate after several hours of active labor  · Other reasons for a  include maternal infections and problems with the placenta  AFTER YOU LEAVE:   Medicines:   · Prescription pain medicine  may be given  Ask how to take this medicine safely  · Acetaminophen  decreases pain and fever  It is available without a doctor's order  Ask how much to take and how often to take it  Follow directions  Acetaminophen can cause liver damage if not taken correctly  · NSAIDs  help decrease swelling and pain or fever  This medicine is available with or without a doctor's order  NSAIDs can cause stomach bleeding or kidney problems in certain people  If you take blood thinner medicine, always ask your obstetrician if NSAIDs are safe for you  Always read the medicine label and follow directions  · Take your medicine as directed  Contact your obstetrician (OB) if you think your medicine is not helping or if you have side effects  Tell him if you are allergic to any medicine  Keep a list of the medicines, vitamins, and herbs you take  Include the amounts, and when and why you take them  Bring the list or the pill bottles to follow-up visits  Carry your medicine list with you in case of an emergency  Follow up with your OB as directed: You may need to return to have your stitches or staples removed   Write down your questions so you remember to ask them during your visits  Wound care:  Carefully wash your wound with soap and water every day  Keep your wound clean and dry  Wear loose, comfortable clothes that do not rub against your wound  Ask your OB about bathing and showering  Drink plenty of liquids: You can lower your risk for a blood clot if you drink plenty of liquids  Ask how much liquid to drink each day and which liquids are best for you  Limit activity until you have fully recovered from surgery:   · Ask when it is safe for you to drive, walk up stairs, lift heavy objects, and have sex  · Ask when it is okay to exercise, and what types of exercise to do  Start slowly and do more as you get stronger  Contact your OB if:   · You have heavy vaginal bleeding that fills 1 or more sanitary pads in 1 hour  · You have a fever  · Your incision is swollen, red, or draining pus  · You have questions or concerns about yourself or your baby  Seek care immediately or call 911 if:   · Blood soaks through your bandage  · Your stitches come apart  · You feel lightheaded, short of breath, and have chest pain  · You cough up blood  · Your arm or leg feels warm, tender, and painful  It may look swollen and red  © 2014 [address] is for End User's use only and may not be sold, redistributed or otherwise used for commercial purposes  All illustrations and images included in CareNotes® are the copyrighted property of A D A M , Inc  or [employer]am Isid[employer]  The above information is an  only  It is not intended as medical advice for individual conditions or treatments  Talk to your doctor, nurse or pharmacist before following any medical regimen to see if it is safe and effective for you

## 2017-10-21 NOTE — ANESTHESIA PREPROCEDURE EVALUATION
Review of Systems/Medical History  Patient summary reviewed  Chart reviewed      Cardiovascular  Negative cardio ROS    Pulmonary  Negative pulmonary ROS ,        GI/Hepatic  Negative GI/hepatic ROS          Negative  ROS        Endo/Other  Negative endo/other ROS      GYN  Currently pregnant , Prior pregnancy/OB history : 7+ Parity: 5,          Hematology  Negative hematology ROS      Musculoskeletal  Obesity ,        Neurology  Negative neurology ROS      Psychology   Depression , bipolar disorder,            Physical Exam    Airway    Mallampati score: II  TM Distance: >3 FB  Neck ROM: full     Dental   No notable dental hx     Cardiovascular  Comment: Negative ROS, Rhythm: regular, Rate: normal, Cardiovascular exam normal    Pulmonary  Pulmonary exam normal Breath sounds clear to auscultation,     Other Findings        Anesthesia Plan  ASA Score- 2       Anesthesia Type- epidural and spinal with ASA Monitors  Additional Monitors:   Airway Plan:           Induction-       Informed Consent- Anesthetic plan and risks discussed with patient  Recent labs personally reviewed:  Lab Results   Component Value Date    WBC 8 45 10/20/2017    HGB 9 0 (L) 10/20/2017     10/20/2017     Lab Results   Component Value Date     2016    K 3 6 2016    BUN 3 (L) 2016    CREATININE 0 77 2016    GLUCOSE 79 2016     No results found for: PTT   Lab Results   Component Value Date    INR 1 18 (H) 2016       Blood type O    No results found for: Federico Hardin MD, have personally seen and evaluated the patient prior to anesthetic care  I have reviewed the pre-anesthetic record, and other medical records if appropriate to the anesthetic care  If a CRNA is involved in the case, I have reviewed the CRNA assessment, if present, and agree   Risks/benefits and alternatives discussed with patient including possible PONV, sore throat, and possibility of rare anesthetic and surgical emergencies

## 2017-10-21 NOTE — OB LABOR/OXYTOCIN SAFETY PROGRESS
Labor Progress Note - Gabriela Jesse 33 y o  female MRN: [id number]    Unit/Bed#: -01 Encounter: [id number]    Obstetric History       T0      L5     SAB0   TAB0   Ectopic1   Multiple0   Live Births0    Obstetric Comments   Ex lap     Gestational Age: 39w0d     Contraction Frequency (minutes): 2-3  Contraction Quality: Moderate  Tachysystole: No   Dilation: 5        Effacement (%): 100  Station: -2  Baseline Rate: 120 bpm  Fetal Heart Rate: 123 BPM  FHR Category: Category I          Notes/comments:   Called by nurse that pt may have SROM with mod  Clear fluid and pt reporting feeling more pressure  SVE relatively unchanged, except completely effaced   Pt  Comfortable with epidural           Mee Guerrero MD 10/21/2017 12:15 AM

## 2017-10-21 NOTE — OB LABOR/OXYTOCIN SAFETY PROGRESS
Labor Progress Note - Sheela Parra 33 y o  female MRN: [id number]    Unit/Bed#: -01 Encounter: [id number]    Obstetric History       T5      L5     SAB0   TAB0   Ectopic1   Multiple0   Live Births5    Obstetric Comments   Ex lap     Gestational Age: 39w6d  Dose (preeti-units/min) Oxytocin: 0 preeti-units/min (Stopped per dr Crys Gupta)  Contraction Frequency (minutes): 3-4  Contraction Quality: Moderate  Tachysystole: No   Dilation: 8        Effacement (%): 80  Station: -1  Baseline Rate: 135 bpm  Fetal Heart Rate: 130 BPM     Oxytocin Safety Progress Check: Safety check completed    Notes/comments:   FHT: 135 cat 1: moderate variability, intermittent early decel  Patient had been call 8 cm on prior exam  Patient fel about 6 cm on  My exam  However she is a grandmultip and her cervix can be stretch even further  This however is a slight change from my initial check around 830 where she felt to be 5-6 cm  Patient is not making effective changes and her contraction are not adequate ( MVUs around 100s most recently)  On exam and US, fetus does not appear to be larger than her other babies  Plan: We can to restart pitocin  Patient had been rupture for hours and has made very little cervical change  If she had adequate contractions, rupture with no cervical change in 4 hours she will have arrested  With in the next 6 hours she has make no cervical changes and her contraction are inadequate,she will have also arrested  The recommendation at that point could be for a primary c/s  If there is fetal intolerance to labor with no response to resuscitation, we plan to proceed to c-birth   Risks, benefits, and alternatives discussed with patient  Questions answered  Patient is in agreement with plan  ROBERT Castro      [facility]   10/21/2017            [facility] 10/21/2017 11:17 AM

## 2017-10-22 LAB
BASOPHILS # BLD AUTO: 0.01 THOUSANDS/ΜL (ref 0–0.1)
BASOPHILS NFR BLD AUTO: 0 % (ref 0–1)
EOSINOPHIL # BLD AUTO: 0.02 THOUSAND/ΜL (ref 0–0.61)
EOSINOPHIL NFR BLD AUTO: 0 % (ref 0–6)
ERYTHROCYTE [DISTWIDTH] IN BLOOD BY AUTOMATED COUNT: 15.5 % (ref 11.6–15.1)
HCT VFR BLD AUTO: 25 % (ref 34.8–46.1)
HGB BLD-MCNC: 7.8 G/DL (ref 11.5–15.4)
LYMPHOCYTES # BLD AUTO: 1.04 THOUSANDS/ΜL (ref 0.6–4.47)
LYMPHOCYTES NFR BLD AUTO: 7 % (ref 14–44)
MCH RBC QN AUTO: 26.1 PG (ref 26.8–34.3)
MCHC RBC AUTO-ENTMCNC: 31.2 G/DL (ref 31.4–37.4)
MCV RBC AUTO: 84 FL (ref 82–98)
MONOCYTES # BLD AUTO: 1.04 THOUSAND/ΜL (ref 0.17–1.22)
MONOCYTES NFR BLD AUTO: 7 % (ref 4–12)
NEUTROPHILS # BLD AUTO: 12.94 THOUSANDS/ΜL (ref 1.85–7.62)
NEUTS SEG NFR BLD AUTO: 86 % (ref 43–75)
NRBC BLD AUTO-RTO: 0 /100 WBCS
PLATELET # BLD AUTO: 194 THOUSANDS/UL (ref 149–390)
PMV BLD AUTO: 11.3 FL (ref 8.9–12.7)
RBC # BLD AUTO: 2.99 MILLION/UL (ref 3.81–5.12)
WBC # BLD AUTO: 15.1 THOUSAND/UL (ref 4.31–10.16)

## 2017-10-22 PROCEDURE — 85025 COMPLETE CBC W/AUTO DIFF WBC: CPT | Performed by: OBSTETRICS & GYNECOLOGY

## 2017-10-22 RX ADMIN — KETOROLAC TROMETHAMINE 30 MG: 30 INJECTION, SOLUTION INTRAMUSCULAR at 04:31

## 2017-10-22 RX ADMIN — DOCUSATE SODIUM 100 MG: 100 CAPSULE, LIQUID FILLED ORAL at 09:10

## 2017-10-22 RX ADMIN — IBUPROFEN 600 MG: 600 TABLET ORAL at 16:44

## 2017-10-22 RX ADMIN — KETOROLAC TROMETHAMINE 30 MG: 30 INJECTION, SOLUTION INTRAMUSCULAR at 09:10

## 2017-10-22 RX ADMIN — SODIUM CHLORIDE, SODIUM LACTATE, POTASSIUM CHLORIDE, AND CALCIUM CHLORIDE 125 ML/HR: .6; .31; .03; .02 INJECTION, SOLUTION INTRAVENOUS at 04:23

## 2017-10-22 RX ADMIN — DOCUSATE SODIUM 100 MG: 100 CAPSULE, LIQUID FILLED ORAL at 16:44

## 2017-10-22 RX ADMIN — DIPHENHYDRAMINE HYDROCHLORIDE 25 MG: 50 INJECTION, SOLUTION INTRAMUSCULAR; INTRAVENOUS at 04:31

## 2017-10-22 RX ADMIN — OXYCODONE HYDROCHLORIDE AND ACETAMINOPHEN 2 TABLET: 5; 325 TABLET ORAL at 12:43

## 2017-10-22 RX ADMIN — OXYCODONE HYDROCHLORIDE AND ACETAMINOPHEN 2 TABLET: 5; 325 TABLET ORAL at 19:54

## 2017-10-22 NOTE — PROGRESS NOTES
Progress Note - OB/GYN   Bertha Hard 33 y o  female MRN: [id number]  Unit/Bed#:  321-01 Encounter: [id number]    Assessment:  POD#0 s/p Primary low transverse  section, stable    Plan:  1  Routine post op care  -d/c lion in am  -f/u voiding trial  -urine output adequate-1300cc since delivery  -f/u am CBC  2  Rubella equivocal  -MMR ordered    Subjective/Objective   Chief Complaint:     POD#0 s/p Primary low transverse  section    Subjective:     Pain: no  Tolerating PO: yes  Voiding: no  Flatus: no  BM: no  Ambulating: no  Breastfeeding: Breastfeeding  Chest pain: no  Shortness of breath: no  Leg pain: no  Lochia: moderate    Objective:     Vitals:  Vitals:    10/21/17 1700 10/21/17 1800 10/21/17 1900 10/21/17 2002   BP: 125/56 129/65 (!) 105/48 108/65   Pulse: 98 93 92 87   Resp:    Temp: 98 3 °F (36 8 °C) 98 6 °F (37 °C) 98 4 °F (36 9 °C) 98 1 °F (36 7 °C)   TempSrc: Oral Oral Oral Oral   SpO2: 97% 96% 99% 99%   Weight:       Height:           Physical Exam:     AAOx3, NAD  CV, RRR  CTA b/l, no WRR  Soft, non-tender, non-distended, no rebound or guarding  Uterine fundus firm and non-tender, at the umbilicus   Incision clean, dry, and intact, closed with running absorbable suture, no erythema  Non tender, negative Margo's bilaterally    Lab, Imaging and other studies: I have personally reviewed pertinent reports        Lab Results   Component Value Date    WBC 12 14 (H) 10/21/2017    HGB 8 7 (L) 10/21/2017    HCT 27 8 (L) 10/21/2017    MCV 84 10/21/2017     10/21/2017               Lesley Childress MD  10/21/17

## 2017-10-22 NOTE — PROGRESS NOTES
Progress Note - OB/GYN   Zygmunt Genera 33 y o  female MRN: [id number]  Unit/Bed#:  321-01 Encounter: [id number]    Assessment:  POD#1 s/p Primary low transverse  section, stable    Plan:  1  Routine post op care  -passed voiding trial   2  Anemia in pregnancy  -Hb 9 0-->8 7-->7 8, asymptomatic  3  Poor prenatal care  -f/u CM c/s    Subjective/Objective   Chief Complaint:     POD#1 s/p Primary low transverse  section    Subjective:     Pain: no  Tolerating PO: yes  Voiding: yes  Flatus: no  BM: no  Ambulating: no  Breastfeeding: Breastfeeding  Chest pain: no  Shortness of breath: no  Leg pain: no  Lochia: minimal    Objective:     Vitals:  Vitals:    10/21/17 1900 10/21/17 2002 10/21/17 2359 10/22/17 0400   BP: (!) 105/48 108/65 (!) 107/49 119/59   Pulse: 92 87 81 85   Resp: 18 18 18 18   Temp: 98 4 °F (36 9 °C) 98 1 °F (36 7 °C) 98 3 °F (36 8 °C) 98 3 °F (36 8 °C)   TempSrc: Oral Oral Oral Oral   SpO2: 99% 99% 96% 100%   Weight:       Height:           Physical Exam:     AAOx3, NAD  CV, RRR  CTA b/l, no WRR  Soft, non-tender, non-distended, no rebound or guarding  Uterine fundus firm and non-tender, at the umbilicus   Incision clean, dry, and intact, closed with running absorbable suture, no erythema  Non tender, negative Margo's bilaterally    Lab, Imaging and other studies: I have personally reviewed pertinent reports        Lab Results   Component Value Date    WBC 15 10 (H) 10/22/2017    HGB 7 8 (L) 10/22/2017    HCT 25 0 (L) 10/22/2017    MCV 84 10/22/2017     10/22/2017               Yue Barrera MD  10/22/17

## 2017-10-22 NOTE — LACTATION NOTE
This note was copied from a baby's chart  Mom states infant has latched on  Encouraged to offer breast before bottle nipple  Given printed info on "When you want to breast and formula feed your infant" and info on paced bottle feeding  Encouraged to call for assistance as needed

## 2017-10-23 VITALS
WEIGHT: 190 LBS | TEMPERATURE: 97.9 F | DIASTOLIC BLOOD PRESSURE: 77 MMHG | HEART RATE: 93 BPM | OXYGEN SATURATION: 100 % | SYSTOLIC BLOOD PRESSURE: 136 MMHG | BODY MASS INDEX: 34.96 KG/M2 | HEIGHT: 62 IN | RESPIRATION RATE: 18 BRPM

## 2017-10-23 LAB — RPR SER QL: NORMAL

## 2017-10-23 PROCEDURE — 90715 TDAP VACCINE 7 YRS/> IM: CPT | Performed by: OBSTETRICS & GYNECOLOGY

## 2017-10-23 PROCEDURE — 90686 IIV4 VACC NO PRSV 0.5 ML IM: CPT | Performed by: OBSTETRICS & GYNECOLOGY

## 2017-10-23 PROCEDURE — 90707 MMR VACCINE SC: CPT | Performed by: OBSTETRICS & GYNECOLOGY

## 2017-10-23 RX ORDER — MEDROXYPROGESTERONE ACETATE 150 MG/ML
150 INJECTION, SUSPENSION INTRAMUSCULAR ONCE
Status: COMPLETED | OUTPATIENT
Start: 2017-10-23 | End: 2017-10-23

## 2017-10-23 RX ORDER — ACETAMINOPHEN 325 MG/1
TABLET ORAL
Qty: 30 TABLET | Refills: 0
Start: 2017-10-23 | End: 2017-11-30

## 2017-10-23 RX ORDER — DOCUSATE SODIUM 100 MG/1
100 CAPSULE, LIQUID FILLED ORAL 2 TIMES DAILY
Qty: 10 CAPSULE | Refills: 0
Start: 2017-10-23 | End: 2017-11-30

## 2017-10-23 RX ORDER — OXYCODONE HYDROCHLORIDE AND ACETAMINOPHEN 5; 325 MG/1; MG/1
1 TABLET ORAL EVERY 4 HOURS PRN
Qty: 20 TABLET | Refills: 0 | Status: SHIPPED | OUTPATIENT
Start: 2017-10-23 | End: 2017-11-02

## 2017-10-23 RX ADMIN — OXYCODONE HYDROCHLORIDE AND ACETAMINOPHEN 2 TABLET: 5; 325 TABLET ORAL at 12:36

## 2017-10-23 RX ADMIN — TETANUS TOXOID, REDUCED DIPHTHERIA TOXOID AND ACELLULAR PERTUSSIS VACCINE, ADSORBED 0.5 ML: 5; 2.5; 8; 8; 2.5 SUSPENSION INTRAMUSCULAR at 11:33

## 2017-10-23 RX ADMIN — DOCUSATE SODIUM 100 MG: 100 CAPSULE, LIQUID FILLED ORAL at 08:27

## 2017-10-23 RX ADMIN — MEDROXYPROGESTERONE ACETATE 150 MG: 150 INJECTION, SUSPENSION INTRAMUSCULAR at 11:30

## 2017-10-23 RX ADMIN — MEASLES, MUMPS, AND RUBELLA VIRUS VACCINE LIVE 0.5 ML: 1000; 12500; 1000 INJECTION, POWDER, LYOPHILIZED, FOR SUSPENSION SUBCUTANEOUS at 11:28

## 2017-10-23 RX ADMIN — IBUPROFEN 600 MG: 600 TABLET ORAL at 11:46

## 2017-10-23 RX ADMIN — INFLUENZA VIRUS VACCINE 0.5 ML: 15; 15; 15; 15 SUSPENSION INTRAMUSCULAR at 11:30

## 2017-10-23 RX ADMIN — OXYCODONE HYDROCHLORIDE AND ACETAMINOPHEN 2 TABLET: 5; 325 TABLET ORAL at 17:05

## 2017-10-23 RX ADMIN — DOCUSATE SODIUM 100 MG: 100 CAPSULE, LIQUID FILLED ORAL at 17:12

## 2017-10-23 RX ADMIN — OXYCODONE HYDROCHLORIDE AND ACETAMINOPHEN 2 TABLET: 5; 325 TABLET ORAL at 00:50

## 2017-10-23 RX ADMIN — OXYCODONE HYDROCHLORIDE AND ACETAMINOPHEN 2 TABLET: 5; 325 TABLET ORAL at 08:27

## 2017-10-23 NOTE — LACTATION NOTE
This note was copied from a baby's chart  Mom states infant has latched on  She states she is both breast and bottle feeding  She is also pumping  Given discharge breastfeeding pkt and use of feeding log reviewed  Discussed where to call for additional assistance as needed

## 2017-10-23 NOTE — SOCIAL WORK
Consults for breast pump and poor PNC  Per chart, mom and baby UDS negative  Chart reviewed and limited prenatal notes found from [facility] [address] in [place]  Spoke with pt (cell# [phone]) who reports she is doing well and baby boy Pola Linares is 6th kid for her and FOB/SO Deann Bloch ([phone]) who is involved and supportive  Pt reports she and FOB live together in [place] home with all of their kids (ages 15, 6, 10, almost 3 and 1years old)  Pt reports having all essential baby supplies needed and FOB is purchasing other supplies now  Pt reports interest in breast pump and has new [place] insurance  Pt to contact her insurance and OB for assistance with ordering pump for home  Pt reports she does have [facility] and will also bottle feed  Pt reports FOB works full time and she cares for all their kids in the home  Pt reports having some support but not much locally  Pt's Valentin Serrato ([phone]) is emergency contact  Pt reports pediatrician is Sonny Christianson in [place] and she relies on [employer] for rides to appts  Pt reports she did have some prenatal care at [facility] [address] in [place] but reports there were issues with rides and coordinating  so she missed many appts  Pt reports she tried going to [facility] but they don't take her insurance  Pt reports she is in treatment for bipolar disorder and sees therapist weekly and psychiatrist for med mgmt at [facility] in [place]  Pt denies any drug use or involvement with [facility] or C&Y  Offered [facility] referral for help with home visiting for parent support and  and baby supplies  Pt agreeable to same and referral sent to intake at [facility]  Pt aware to anticipate follow up call  Pt denies any other CM needs at this time  No other needs noted for d/c home

## 2017-10-23 NOTE — PLAN OF CARE
DISCHARGE PLANNING     Discharge to home or other facility with appropriate resources Adequate for Discharge        INFECTION - ADULT     Absence or prevention of progression during hospitalization Adequate for Discharge     Absence of fever/infection during neutropenic period Adequate for Discharge        Knowledge Deficit     Verbalizes understanding of labor plan Adequate for Discharge     Patient/family/caregiver demonstrates understanding of disease process, treatment plan, medications, and discharge instructions Adequate for Discharge        PAIN - ADULT     Verbalizes/displays adequate comfort level or baseline comfort level Adequate for Discharge        SAFETY ADULT     Patient will remain free of falls Adequate for Discharge     Maintain or return to baseline ADL function Adequate for Discharge     Maintain or return mobility status to optimal level Adequate for Discharge

## 2017-10-23 NOTE — PROGRESS NOTES
Progress Note - OB/GYN   Fidelia Cam 33 y o  female MRN: [id number]  Unit/Bed#:  321-01 Encounter: [id number]    Assessment:  Term pregnancy, s/p 1LTCS, POD#2  Poor prenatal care  Acute on chronic anemia    Plan:  Continue routine postpartum care  Encourage ambulation  Encourage increased PO water intake  Pain control as needed  Poor prenatal care: DIscussed with pt the importance of following up in clinic postpartum  Pt desires tubal ligation; discussed that we offer such services in clinic but she needs to make her postpartum appointment to further discuss and scheduled tubal ligation  Has not yet been seen by case management  Pt desires Depo Provera contraception bridge to tubal ligation  Acute on chronic anemia: 9 0 ->->7 8  Pt currently asymptomatic  Desires early discharge to home today    Subjective/Objective   Chief Complaint: I'm sore but doing okay    Subjective: Pt reports feeling well today  Ambulating  Tolerating PO  Voiding without difficulty  No BM yet but passing flatus  Lochia improving  Denies HA, CP, SOB, extremity pain  Objective:   Vitals: Blood pressure 134/67, pulse 98, temperature 98 4 °F (36 9 °C), temperature source Oral, resp  rate 18, height 5' 2" (1 575 m), weight 86 2 kg (190 lb), SpO2 100 %, currently breastfeeding  ,Body mass index is 34 75 kg/m²  Intake/Output Summary (Last 24 hours) at 10/23/17 0619  Last data filed at 10/22/17 1644   Gross per 24 hour   Intake                0 ml   Output             1700 ml   Net            -1700 ml       Invasive Devices          No matching active lines, drains, or airways          Physical Exam:   Gen: AaOx3, NAD, pleasant  No increased work of breathing  Abd: Soft, nontender, nondistended  Incision is clean, dry, and intact  : Fundus firm, nontender, located at +4YB above umbilicus  Extremities: No edema, nontender, Negative Margo's bilaterally      Lab, Imaging and other studies: I have personally reviewed pertinent reports  Jennifer Mock, DO  OB/GYN, PGY2  10/23/2017, 6:21 AM

## 2017-10-24 LAB
ABO GROUP BLD BPU: NORMAL
ABO GROUP BLD BPU: NORMAL
BPU ID: NORMAL
BPU ID: NORMAL
UNIT DISPENSE STATUS: NORMAL
UNIT DISPENSE STATUS: NORMAL
UNIT PRODUCT CODE: NORMAL
UNIT PRODUCT CODE: NORMAL
UNIT RH: NORMAL
UNIT RH: NORMAL

## 2017-11-30 ENCOUNTER — HOSPITAL ENCOUNTER (EMERGENCY)
Facility: HOSPITAL | Age: 27
Discharge: HOME | End: 2017-11-30
Attending: EMERGENCY MEDICINE | Admitting: EMERGENCY MEDICINE
Payer: COMMERCIAL

## 2017-11-30 ENCOUNTER — APPOINTMENT (EMERGENCY)
Dept: RADIOLOGY | Facility: HOSPITAL | Age: 27
End: 2017-11-30
Payer: COMMERCIAL

## 2017-11-30 VITALS
WEIGHT: 180 LBS | OXYGEN SATURATION: 98 % | BODY MASS INDEX: 32.92 KG/M2 | HEART RATE: 97 BPM | TEMPERATURE: 97.9 F | SYSTOLIC BLOOD PRESSURE: 129 MMHG | RESPIRATION RATE: 18 BRPM | DIASTOLIC BLOOD PRESSURE: 78 MMHG

## 2017-11-30 DIAGNOSIS — S00.03XA CONTUSION OF SCALP, INITIAL ENCOUNTER: ICD-10-CM
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
## 2017-11-30 DIAGNOSIS — Q34.9 TRACHEAL DIVERTICULUM: Primary | ICD-10-CM

## 2017-11-30 PROCEDURE — 70450 CT HEAD/BRAIN W/O DYE: CPT

## 2017-11-30 PROCEDURE — 72125 CT NECK SPINE W/O DYE: CPT

## 2017-11-30 PROCEDURE — 99284 EMERGENCY DEPT VISIT MOD MDM: CPT

## 2017-11-30 NOTE — DISCHARGE INSTRUCTIONS
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
## 2018-05-28 ENCOUNTER — TRANSCRIBE ORDERS (OUTPATIENT)
Dept: URGENT CARE | Facility: CLINIC | Age: 28
End: 2018-05-28

## 2018-05-28 DIAGNOSIS — Z02.1 ENCOUNTER FOR PRE-EMPLOYMENT EXAMINATION: Primary | ICD-10-CM

## 2018-05-28 PROCEDURE — 86762 RUBELLA ANTIBODY: CPT | Performed by: PHYSICIAN ASSISTANT

## 2018-05-28 PROCEDURE — 86765 RUBEOLA ANTIBODY: CPT | Performed by: PHYSICIAN ASSISTANT

## 2018-05-28 PROCEDURE — 86787 VARICELLA-ZOSTER ANTIBODY: CPT | Performed by: PHYSICIAN ASSISTANT

## 2018-05-28 PROCEDURE — 86480 TB TEST CELL IMMUN MEASURE: CPT | Performed by: PHYSICIAN ASSISTANT

## 2018-05-28 PROCEDURE — 86735 MUMPS ANTIBODY: CPT | Performed by: PHYSICIAN ASSISTANT

## 2018-07-04 ENCOUNTER — HOSPITAL ENCOUNTER (EMERGENCY)
Facility: HOSPITAL | Age: 28
Discharge: HOME | End: 2018-07-04
Attending: EMERGENCY MEDICINE | Admitting: EMERGENCY MEDICINE
Payer: COMMERCIAL

## 2018-07-04 VITALS
RESPIRATION RATE: 18 BRPM | BODY MASS INDEX: 36.44 KG/M2 | SYSTOLIC BLOOD PRESSURE: 138 MMHG | OXYGEN SATURATION: 100 % | DIASTOLIC BLOOD PRESSURE: 71 MMHG | HEIGHT: 62 IN | HEART RATE: 80 BPM | WEIGHT: 198 LBS | TEMPERATURE: 97.6 F

## 2018-07-04 DIAGNOSIS — A08.4 VIRAL GASTROENTERITIS: Primary | ICD-10-CM

## 2018-07-04 PROCEDURE — 99283 EMERGENCY DEPT VISIT LOW MDM: CPT

## 2018-07-04 RX ORDER — ONDANSETRON 4 MG/1
4 TABLET, ORALLY DISINTEGRATING ORAL EVERY 6 HOURS PRN
Qty: 20 TABLET | Refills: 0 | Status: SHIPPED | OUTPATIENT
Start: 2018-07-04 | End: 2018-10-12

## 2018-07-04 RX ORDER — ONDANSETRON 4 MG/1
4 TABLET, ORALLY DISINTEGRATING ORAL ONCE
Status: COMPLETED | OUTPATIENT
Start: 2018-07-04 | End: 2018-07-04

## 2018-07-04 RX ORDER — NAPROXEN 500 MG/1
500 TABLET ORAL ONCE
Status: DISCONTINUED | OUTPATIENT
Start: 2018-07-04 | End: 2018-07-04 | Stop reason: HOSPADM

## 2018-07-04 RX ADMIN — ONDANSETRON 4 MG: 4 TABLET, ORALLY DISINTEGRATING ORAL at 16:35

## 2018-07-04 NOTE — ED PROVIDER NOTES
History  Chief Complaint   Patient presents with    Abdominal Pain     patient c/o nausea, vomiting, abdominal pain and fever for the past several days  34 y/o female presenting with nausea vomiting and diarrhea x 3 days  Overall vomiting has improved however persists with diarrhea  Initially had fevers however no longer  Here with 7 month old son with similar symptoms  Currently on her menses  Occasional nausea and abdominal cramping prior to episodes of diarrhea  Currently does not have abdominal pain  Denies rash, shortness of breath, cough, congestion, sore throat, hematochezia  None       Past Medical History:   Diagnosis Date    [facility] [address] )     Psychiatric disorder     bipolar    Varicella     Child Hx       Past Surgical History:   Procedure Laterality Date    NE  DELIVERY ONLY Bilateral 10/21/2017    Procedure:  SECTION (); Surgeon: Diana Townsend MD;  [facility] ;  Service: Obstetrics    NE LAP,DIAGNOSTIC ABDOMEN N/A 2016    Procedure: EXPLORATORY LAPAROTOMY, LEFT SALPINGECTOMY;  Surgeon: Ricardo Bennett MD;  Location: [facility] OR;  Service: Gynecology       History reviewed  No pertinent family history  I have reviewed and agree with the history as documented  Social History   Substance Use Topics    Smoking status: Former Smoker     Packs/day: 0 00    Smokeless tobacco: Never Used    Alcohol use No        Review of Systems   Constitutional: Negative  HENT: Negative  Eyes: Negative  Respiratory: Negative  Gastrointestinal: Positive for diarrhea, nausea and vomiting  Negative for abdominal distention, abdominal pain, anal bleeding, blood in stool, constipation and rectal pain  Genitourinary: Negative  Musculoskeletal: Negative  Skin: Negative  Neurological: Negative  All other systems reviewed and are negative        Physical Exam  Physical Exam   Constitutional: She is oriented to person, place, and time  She appears well-developed and well-nourished  HENT:   Head: Normocephalic and atraumatic  Right Ear: External ear normal    Left Ear: External ear normal    Nose: Nose normal    Mouth/Throat: Oropharynx is clear and moist    Eyes: Conjunctivae and EOM are normal  Pupils are equal, round, and reactive to light  Neck: Normal range of motion  Neck supple  Cardiovascular: Normal rate, regular rhythm, normal heart sounds and intact distal pulses  Exam reveals no gallop and no friction rub  No murmur heard  Pulmonary/Chest: Effort normal and breath sounds normal  No respiratory distress  She has no wheezes  She has no rales  She exhibits no tenderness  spo2 is 100% indicating adequate oxygenation  Abdominal: Soft  Bowel sounds are normal  She exhibits no distension and no mass  There is no tenderness  There is no rebound and no guarding  No hernia  Lymphadenopathy:     She has no cervical adenopathy  Neurological: She is alert and oriented to person, place, and time  Skin: Skin is warm and dry  Capillary refill takes less than 2 seconds  Nursing note and vitals reviewed        Vital Signs  ED Triage Vitals [07/04/18 1557]   Temperature Pulse Respirations Blood Pressure SpO2   97 6 °F (36 4 °C) 80 18 138/71 100 %      Temp Source Heart Rate Source Patient Position - Orthostatic VS BP Location FiO2 (%)   Tympanic Monitor Sitting Left arm --      Pain Score       5           Vitals:    07/04/18 1557   BP: 138/71   Pulse: 80   Patient Position - Orthostatic VS: Sitting       Visual Acuity      ED Medications  Medications   ondansetron (ZOFRAN-ODT) dispersible tablet 4 mg (not administered)   naproxen (NAPROSYN) tablet 500 mg (not administered)       Diagnostic Studies  Results Reviewed     None                 No orders to display              Procedures  Procedures       Phone Contacts  ED Phone Contact    ED Course                               MDM  Number of Diagnoses or Management Options  Diagnosis management comments: Appears well  Overall symptoms improving and tolerating fluids PO  Will prescribe zofran  Likely viral gastroenteritis  Patient is informed to return to the emergency department for worsening of symptoms and was given proper education regarding their diagnosis and symptoms  Otherwise the patient is informed to follow up with their primary care doctor for re-evaluation  The patient verbalizes understanding and agrees with above assessment and plan  All questions were answered  Please Note: Fluency Direct voice recognition software may have been used in the creation of this document  Wrong words or sound a like substitutions may have occurred due to the inherent limitations of the voice software  Amount and/or Complexity of Data Reviewed  Review and summarize past medical records: yes  Independent visualization of images, tracings, or specimens: yes      CritCare Time    Disposition  Final diagnoses:   Viral gastroenteritis     Time reflects when diagnosis was documented in both MDM as applicable and the Disposition within this note     Time User Action Codes Description Comment    7/4/2018  4:26 PM Sam Roman Add [A08 4] Viral gastroenteritis       ED Disposition     ED Disposition Condition Comment    Discharge  [place] Sr discharge to home/self care      Condition at discharge: Good        Follow-up Information     Follow up With Specialties Details Why Contact Info Additional [address] Emergency Department Emergency Medicine Go to If symptoms worsen [address] [facility]  [phone] [facility], [facility], [address] Laurie Flores MD  Schedule an appointment as soon as possible for a visit As needed [address] [place] [address]  [phone]             Patient's Medications   Discharge Prescriptions    ONDANSETRON (ZOFRAN-ODT) 4 MG DISINTEGRATING TABLET    Take 1 tablet (4 mg total) by mouth every 6 (six) hours as needed for nausea or vomiting       Start Date: 7/4/2018  End Date: --       Order Dose: 4 mg       Quantity: 20 tablet    Refills: 0     No discharge procedures on file      ED Provider  Electronically Signed by           David Ramirez PA-C  07/04/18 3593

## 2018-07-04 NOTE — DISCHARGE INSTRUCTIONS
Gastroenteritis, Ambulatory Care   GENERAL INFORMATION:   Gastroenteritis , or stomach flu, is an infection of the stomach and intestines  It is caused by bacteria, parasites, or viruses  Common symptoms include the following:   · Diarrhea or gas    · Nausea, vomiting, or poor appetite    · Abdominal cramps, pain, or gurgling    · Fever    · Tiredness or weakness    · Headaches or muscle aches with any of the above symptoms  Seek immediate care for the following symptoms:   · Blood in your diarrhea    · Unable to stop vomiting    · No urinating for 12 hours    · Legs or arms that are blue    · Trouble breathing or a very fast pulse    · Lightheadedness or dizziness  Treatment for gastroenteritis  may include medicines to stop your diarrhea and vomiting  You may also need medicines to treat an infection caused by bacteria or parasites  Manage your symptoms:   · Drink liquids as directed  Ask how much liquid to drink each day and which liquids are best for you  You may need to drink more liquids than usual to prevent dehydration  Suck on ice or take small sips of liquids often if you have trouble keeping liquids down  · Drink oral rehydration solution (ORS) as directed  An ORS contains water, salts, and sugar that are needed to replace lost body fluids  Ask what kind of ORS to use and how much to drink  · Eat bland foods  When you feel hungry, begin to eat soft, bland foods  Examples are bananas, clear soup, potatoes, and applesauce  Do not eat dairy products, alcohol, sugary drinks, or caffeine until you feel better  Prevent the spread of germs:   · Wash your hands often  Use soap and water  Wash your hands after you use the bathroom, change a child's diapers, or sneeze  Wash your hands before you prepare or eat food  · Clean surfaces and do laundry often  Wash your clothes and towels separately from the rest of the laundry   Clean surfaces in your home with antibacterial  or bleach  · Clean food thoroughly and cook safely  Wash raw vegetables before you cook  Cook meat, fish, and eggs fully  Do not use the same dishes for raw meat as you do for other foods  Refrigerate any leftover food immediately  · Be aware when you camp or travel  Drink only clean water  Do not drink from rivers or lakes unless you purify or boil the water first  When you travel, drink bottled water and avoid ice  Do not eat fruit that has not been peeled  Avoid raw fish or meat that is not fully cooked  Follow up with your healthcare provider as directed:  Write down your questions so you remember to ask them during your visits  CARE AGREEMENT:   You have the right to help plan your care  Learn about your health condition and how it may be treated  Discuss treatment options with your caregivers to decide what care you want to receive  You always have the right to refuse treatment  The above information is an  only  It is not intended as medical advice for individual conditions or treatments  Talk to your doctor, nurse or pharmacist before following any medical regimen to see if it is safe and effective for you  © 2014 [address] is for End User's use only and may not be sold, redistributed or otherwise used for commercial purposes  All illustrations and images included in CareNotes® are the copyrighted property of A D A M , Inc  or [employer]

## 2018-10-12 ENCOUNTER — APPOINTMENT (EMERGENCY)
Dept: RADIOLOGY | Facility: HOSPITAL | Age: 28
End: 2018-10-12
Payer: COMMERCIAL

## 2018-10-12 ENCOUNTER — HOSPITAL ENCOUNTER (EMERGENCY)
Facility: HOSPITAL | Age: 28
Discharge: HOME | End: 2018-10-12
Attending: EMERGENCY MEDICINE | Admitting: EMERGENCY MEDICINE
Payer: COMMERCIAL

## 2018-10-12 VITALS
TEMPERATURE: 96.8 F | WEIGHT: 200 LBS | HEART RATE: 100 BPM | DIASTOLIC BLOOD PRESSURE: 70 MMHG | OXYGEN SATURATION: 98 % | BODY MASS INDEX: 36.8 KG/M2 | HEIGHT: 62 IN | SYSTOLIC BLOOD PRESSURE: 164 MMHG | RESPIRATION RATE: 18 BRPM

## 2018-10-12 DIAGNOSIS — O03.9 MISCARRIAGE: Primary | ICD-10-CM

## 2018-10-12 LAB
ALBUMIN SERPL BCP-MCNC: 3.3 G/DL (ref 3.5–5)
ALP SERPL-CCNC: 69 U/L (ref 46–116)
ALT SERPL W P-5'-P-CCNC: 24 U/L (ref 12–78)
ANION GAP SERPL CALCULATED.3IONS-SCNC: 9 MMOL/L (ref 4–13)
AST SERPL W P-5'-P-CCNC: 16 U/L (ref 5–45)
B-HCG SERPL-ACNC: ABNORMAL MIU/ML
BACTERIA UR QL AUTO: ABNORMAL /HPF
BASOPHILS # BLD AUTO: 0.04 THOUSANDS/ΜL (ref 0–0.1)
BASOPHILS NFR BLD AUTO: 1 % (ref 0–1)
BILIRUB SERPL-MCNC: 0.3 MG/DL (ref 0.2–1)
BILIRUB UR QL STRIP: NEGATIVE
BUN SERPL-MCNC: 5 MG/DL (ref 5–25)
CALCIUM SERPL-MCNC: 8.7 MG/DL (ref 8.3–10.1)
CHLORIDE SERPL-SCNC: 103 MMOL/L (ref 100–108)
CLARITY UR: ABNORMAL
CO2 SERPL-SCNC: 24 MMOL/L (ref 21–32)
COLOR UR: YELLOW
CREAT SERPL-MCNC: 0.62 MG/DL (ref 0.6–1.3)
EOSINOPHIL # BLD AUTO: 0.15 THOUSAND/ΜL (ref 0–0.61)
EOSINOPHIL NFR BLD AUTO: 2 % (ref 0–6)
ERYTHROCYTE [DISTWIDTH] IN BLOOD BY AUTOMATED COUNT: 16.5 % (ref 11.6–15.1)
EXT PREG TEST URINE: NORMAL
GFR SERPL CREATININE-BSD FRML MDRD: 144 ML/MIN/1.73SQ M
GLUCOSE SERPL-MCNC: 98 MG/DL (ref 65–140)
GLUCOSE UR STRIP-MCNC: NEGATIVE MG/DL
HCT VFR BLD AUTO: 36 % (ref 34.8–46.1)
HGB BLD-MCNC: 11.3 G/DL (ref 11.5–15.4)
HGB UR QL STRIP.AUTO: NEGATIVE
IMM GRANULOCYTES # BLD AUTO: 0.02 THOUSAND/UL (ref 0–0.2)
IMM GRANULOCYTES NFR BLD AUTO: 0 % (ref 0–2)
KETONES UR STRIP-MCNC: NEGATIVE MG/DL
LEUKOCYTE ESTERASE UR QL STRIP: ABNORMAL
LYMPHOCYTES # BLD AUTO: 2.66 THOUSANDS/ΜL (ref 0.6–4.47)
LYMPHOCYTES NFR BLD AUTO: 36 % (ref 14–44)
MCH RBC QN AUTO: 27.8 PG (ref 26.8–34.3)
MCHC RBC AUTO-ENTMCNC: 31.4 G/DL (ref 31.4–37.4)
MCV RBC AUTO: 89 FL (ref 82–98)
MONOCYTES # BLD AUTO: 0.43 THOUSAND/ΜL (ref 0.17–1.22)
MONOCYTES NFR BLD AUTO: 6 % (ref 4–12)
MUCOUS THREADS UR QL AUTO: ABNORMAL
NEUTROPHILS # BLD AUTO: 4.15 THOUSANDS/ΜL (ref 1.85–7.62)
NEUTS SEG NFR BLD AUTO: 55 % (ref 43–75)
NITRITE UR QL STRIP: NEGATIVE
NON-SQ EPI CELLS URNS QL MICRO: ABNORMAL /HPF
NRBC BLD AUTO-RTO: 0 /100 WBCS
PH UR STRIP.AUTO: 7 [PH] (ref 5–9)
PLATELET # BLD AUTO: 332 THOUSANDS/UL (ref 149–390)
PMV BLD AUTO: 9.5 FL (ref 8.9–12.7)
POTASSIUM SERPL-SCNC: 3.8 MMOL/L (ref 3.5–5.3)
PROT SERPL-MCNC: 6.9 G/DL (ref 6.4–8.2)
PROT UR STRIP-MCNC: NEGATIVE MG/DL
RBC # BLD AUTO: 4.07 MILLION/UL (ref 3.81–5.12)
RBC #/AREA URNS AUTO: ABNORMAL /HPF
SODIUM SERPL-SCNC: 136 MMOL/L (ref 136–145)
SP GR UR STRIP.AUTO: 1.02 (ref 1–1.03)
UROBILINOGEN UR QL STRIP.AUTO: 0.2 E.U./DL
WBC # BLD AUTO: 7.45 THOUSAND/UL (ref 4.31–10.16)
WBC #/AREA URNS AUTO: ABNORMAL /HPF

## 2018-10-12 PROCEDURE — 96360 HYDRATION IV INFUSION INIT: CPT

## 2018-10-12 PROCEDURE — 81001 URINALYSIS AUTO W/SCOPE: CPT | Performed by: EMERGENCY MEDICINE

## 2018-10-12 PROCEDURE — 96361 HYDRATE IV INFUSION ADD-ON: CPT

## 2018-10-12 PROCEDURE — 84702 CHORIONIC GONADOTROPIN TEST: CPT | Performed by: PHYSICIAN ASSISTANT

## 2018-10-12 PROCEDURE — 85025 COMPLETE CBC W/AUTO DIFF WBC: CPT | Performed by: PHYSICIAN ASSISTANT

## 2018-10-12 PROCEDURE — 87077 CULTURE AEROBIC IDENTIFY: CPT | Performed by: EMERGENCY MEDICINE

## 2018-10-12 PROCEDURE — 87186 SC STD MICRODIL/AGAR DIL: CPT | Performed by: EMERGENCY MEDICINE

## 2018-10-12 PROCEDURE — 87086 URINE CULTURE/COLONY COUNT: CPT | Performed by: EMERGENCY MEDICINE

## 2018-10-12 PROCEDURE — 36415 COLL VENOUS BLD VENIPUNCTURE: CPT | Performed by: PHYSICIAN ASSISTANT

## 2018-10-12 PROCEDURE — 81025 URINE PREGNANCY TEST: CPT | Performed by: EMERGENCY MEDICINE

## 2018-10-12 PROCEDURE — 76801 OB US < 14 WKS SINGLE FETUS: CPT

## 2018-10-12 PROCEDURE — 80053 COMPREHEN METABOLIC PANEL: CPT | Performed by: PHYSICIAN ASSISTANT

## 2018-10-12 PROCEDURE — 99284 EMERGENCY DEPT VISIT MOD MDM: CPT

## 2018-10-12 RX ORDER — METOCLOPRAMIDE 10 MG/1
10 TABLET ORAL ONCE
Status: COMPLETED | OUTPATIENT
Start: 2018-10-12 | End: 2018-10-12

## 2018-10-12 RX ADMIN — SODIUM CHLORIDE 1000 ML: 0.9 INJECTION, SOLUTION INTRAVENOUS at 11:17

## 2018-10-12 RX ADMIN — METOCLOPRAMIDE 10 MG: 10 TABLET ORAL at 11:06

## 2018-10-12 NOTE — ED PROVIDER NOTES
History  Chief Complaint   Patient presents with    Urinary Frequency     started last night into this morning  Also has n/v, thinks she might be pregnant  32 y/o female, , presenting with pelvic pain and changes in urination over the past few days  Pain worsened on the right lower abdominal region than comes and goes accompanied with back pain  She has a h/o ectopic 2 years ago  No other pregnancy complications  LMP was , approx 11 days late  Some burning with urination as well as decreased urine output  Has been working long shifts which worsens the back pain which is nonradiating ranking 6/10  Nausea without vomiting over the past week  Has been feeling somewhat fatigued and dehydrated as well due to decreased fluid intake from nausea  Denies vaginal bleeding, vaginal discharge, hematuria, diarrhea, constipation, vomiting  None       Past Medical History:   Diagnosis Date    Bipolar 1 disorder ([facility] [address] )     Psychiatric disorder     bipolar    Varicella     Child Hx       Past Surgical History:   Procedure Laterality Date    LA  DELIVERY ONLY Bilateral 10/21/2017    Procedure:  SECTION (); Surgeon: Edwina Vega MD;  Location: [facility];  Service: Obstetrics    LA LAP,DIAGNOSTIC ABDOMEN N/A 2016    Procedure: EXPLORATORY LAPAROTOMY, LEFT SALPINGECTOMY;  Surgeon: Jethro Villarreal MD;  Location: [facility] MAIN [facility];  Service: Gynecology       History reviewed  No pertinent family history  I have reviewed and agree with the history as documented  Social History   Substance Use Topics    Smoking status: Former Smoker     Packs/day: 0 00    Smokeless tobacco: Never Used    Alcohol use No        Review of Systems   Constitutional: Negative  Negative for chills, fever and unexpected weight change  Denies IV drug use     HENT: Negative  Eyes: Negative  Respiratory: Negative  Negative for cough, chest tightness, shortness of breath and wheezing  Cardiovascular: Negative  Negative for chest pain and palpitations  Gastrointestinal: Negative  Negative for abdominal pain, constipation, diarrhea, nausea and vomiting  Genitourinary: Positive for decreased urine volume, frequency and pelvic pain  Negative for difficulty urinating, dysuria, flank pain, hematuria, urgency, vaginal bleeding, vaginal discharge and vaginal pain  Denies numbness, tingling in the groin  Musculoskeletal: Positive for back pain  Negative for arthralgias, gait problem, joint swelling, myalgias, neck pain and neck stiffness  Skin: Negative  Negative for color change  Neurological: Negative  Negative for dizziness, tremors, weakness, light-headedness, numbness and headaches  All other systems reviewed and are negative  Physical Exam  Physical Exam   Constitutional: She is oriented to person, place, and time  She appears well-developed and well-nourished  HENT:   Head: Normocephalic and atraumatic  Right Ear: External ear normal    Left Ear: External ear normal    Nose: Nose normal    Mouth/Throat: Oropharynx is clear and moist    Eyes: Pupils are equal, round, and reactive to light  Conjunctivae and EOM are normal    Neck: Normal range of motion  Neck supple  Cardiovascular: Normal rate, regular rhythm, normal heart sounds and intact distal pulses  Exam reveals no gallop and no friction rub  No murmur heard  Pulmonary/Chest: Effort normal and breath sounds normal  No respiratory distress  She has no wheezes  She has no rales  She exhibits no tenderness  S PO2 is 98% indicating adequate oxygenation  Abdominal: Soft  Bowel sounds are normal  She exhibits no distension and no mass  There is no tenderness  There is no rebound and no guarding  No hernia  Neurological: She is alert and oriented to person, place, and time  Skin: Skin is warm and dry  Capillary refill takes less than 2 seconds  Nursing note and vitals reviewed        Vital Signs  ED Triage Vitals [10/12/18 1027]   Temperature Pulse Respirations Blood Pressure SpO2   (!) 96 8 °F (36 °C) 100 18 164/70 98 %      Temp Source Heart Rate Source Patient Position - Orthostatic VS BP Location FiO2 (%)   Tympanic Monitor Sitting Right arm --      Pain Score       8           Vitals:    10/12/18 1027   BP: 164/70   Pulse: 100   Patient Position - Orthostatic VS: Sitting       Visual Acuity      ED Medications  Medications   sodium chloride 0 9 % bolus 1,000 mL (1,000 mL Intravenous New Bag 10/12/18 1117)   metoclopramide (REGLAN) tablet 10 mg (10 mg Oral Given 10/12/18 1106)       Diagnostic Studies  Results Reviewed     Procedure Component Value Units Date/Time    hCG, quantitative [[id number]]  (Abnormal) Collected:  10/12/18 1116    Lab Status:  Final result Specimen:  Blood from Arm, Right Updated:  10/12/18 1224     HCG, Quant 86,370 (H) mIU/mL     Narrative:          Expected Ranges:     Approximate               Approximate HCG  Gestation age          Concentration ( mIU/mL)  _____________          ______________________   Vivien Leivaia                      HCG values  0 2-1                       5-50  1-2                           2-3                         100-5000  3-4                         500-79025  4-5                         1000-26216  5-6                         49933-384907  6-8                         26278-176807  8-12                        53354-636362    Comprehensive metabolic panel [[id number]]  (Abnormal) Collected:  10/12/18 1116    Lab Status:  Final result Specimen:  Blood from Arm, Right Updated:  10/12/18 1158     Sodium 136 mmol/L      Potassium 3 8 mmol/L      Chloride 103 mmol/L      CO2 24 mmol/L      ANION GAP 9 mmol/L      BUN 5 mg/dL      Creatinine 0 62 mg/dL      Glucose 98 mg/dL      Calcium 8 7 mg/dL      AST 16 U/L      ALT 24 U/L      Alkaline Phosphatase 69 U/L      Total Protein 6 9 g/dL      Albumin 3 3 (L) g/dL      Total Bilirubin 0 30 mg/dL      eGFR 144 ml/min/1 73sq m     Narrative:         National Kidney Disease Education Program recommendations are as follows:  GFR calculation is accurate only with a steady state creatinine  Chronic Kidney disease less than 60 ml/min/1 73 sq  meters  Kidney failure less than 15 ml/min/1 73 sq  meters      CBC and differential [[id number]]  (Abnormal) Collected:  10/12/18 1116    Lab Status:  Final result Specimen:  Blood from Arm, Right Updated:  10/12/18 1124     WBC 7 45 Thousand/uL      RBC 4 07 Million/uL      Hemoglobin 11 3 (L) g/dL      Hematocrit 36 0 %      MCV 89 fL      MCH 27 8 pg      MCHC 31 4 g/dL      RDW 16 5 (H) %      MPV 9 5 fL      Platelets 698 Thousands/uL      nRBC 0 /100 WBCs      Neutrophils Relative 55 %      Immat GRANS % 0 %      Lymphocytes Relative 36 %      Monocytes Relative 6 %      Eosinophils Relative 2 %      Basophils Relative 1 %      Neutrophils Absolute 4 15 Thousands/µL      Immature Grans Absolute 0 02 Thousand/uL      Lymphocytes Absolute 2 66 Thousands/µL      Monocytes Absolute 0 43 Thousand/µL      Eosinophils Absolute 0 15 Thousand/µL      Basophils Absolute 0 04 Thousands/µL     Urine Microscopic [[id number]]  (Abnormal) Collected:  10/12/18 1030    Lab Status:  Final result Specimen:  Urine from Urine, Clean Catch Updated:  10/12/18 1100     RBC, UA None Seen /hpf      WBC, UA 2-4 (A) /hpf      Epithelial Cells Innumerable (A) /hpf      Bacteria, UA Moderate (A) /hpf      MUCOUS THREADS Occasional (A)    Chlamydia/GC amplified DNA by PCR [[id number]]     Lab Status:  No result Specimen:  Urine from Urine, Other     UA w Reflex to Microscopic [[id number]]  (Abnormal) Collected:  10/12/18 1030    Lab Status:  Final result Specimen:  Urine from Urine, Clean Catch Updated:  10/12/18 1047     Color, UA Yellow     Clarity, UA Slightly Cloudy     Specific [place], UA 1 020     pH, UA 7 0     Leukocytes, UA Trace (A)     Nitrite, UA Negative     Protein, UA Negative mg/dl      Glucose, UA Negative mg/dl      Ketones, UA Negative mg/dl      Urobilinogen, UA 0 2 E U /dl      Bilirubin, UA Negative     Blood, UA Negative    Urine culture [[id number]] Collected:  10/12/18 1031    Lab Status: In process Specimen:  Urine from Urine, Clean Catch Updated:  10/12/18 1043    POCT pregnancy, urine [[id number]]  (Normal) Resulted:  10/12/18 1034    Lab Status:  Final result Updated:  10/12/18 1034     EXT PREG TEST UR (Ref: Negative) abmornal                 US OB < 14 weeks with transvaginal   Final Result by Caity Olvera MD (10/12 1304)   An intrauterine gestational sac is present  This contains a nonviable embryo  Findings compatible with fetal demise  Additional cystic structures are seen within the uterus which could represent additional sacs and/or areas of hemorrhage  No additional fetal pole is seen  * I personally telephoned this result to Dr Zak Carrasquillo  on12/2018 1:02 PM       Workstation performed: [id number]                    Procedures  Procedures       Phone Contacts  ED Phone Contact    ED Course  ED Course as of Oct 12 1335   Fri Oct 12, 2018   1102 Attempted to reach [address] [facility] without success                                MDM  Number of Diagnoses or Management Options  Diagnosis management comments: Discussed with patient lab work and fetal demise shown on the ultrasound  Patient agrees to f/u with OBGYN and given multiple contact information, she is told to call me at the ED should she have any issues scheduling an appointment or should she have any questions  She is currently not having any abdominal pain nor vaginal bleeding  Patient is informed to return to the emergency department for worsening of symptoms and was given proper education regarding their diagnosis and symptoms  Otherwise the patient is informed to follow up with their primary care doctor for re-evaluation  The patient verbalizes understanding and agrees with above assessment and plan  All questions were answered  Please Note: Fluency Direct voice recognition software may have been used in the creation of this document  Wrong words or sound a like substitutions may have occurred due to the inherent limitations of the voice software  Amount and/or Complexity of Data Reviewed  Clinical lab tests: ordered and reviewed  Tests in the radiology section of CPT®: ordered and reviewed  Review and summarize past medical records: yes  Independent visualization of images, tracings, or specimens: yes      CritCare Time    Disposition  Final diagnoses:   Miscarriage     Time reflects when diagnosis was documented in both MDM as applicable and the Disposition within this note     Time User Action Codes Description Comment    10/12/2018  1:34 PM Tadeo Jax Add [O03 9] Miscarriage       ED Disposition     ED Disposition Condition Comment    Discharge  Derek Leal discharge to home/self care  Condition at discharge: Good        Follow-up Information     Follow up With Specialties Details Why Contact Info Additional [address] Emergency Department Emergency Medicine Go to If symptoms worsen [address] [facility]  [phone] [facility], [facility], [address] [place] [address] Family Medicine Schedule an appointment as soon as possible for a visit if you are unable to make an appointment with Dr Caroline Dean this upcoming week [facility]  [facility] MD [facility], Obstetrics, Gynecology Schedule an appointment as soon as possible for a visit in 3 days  [facility]  [phone]             Patient's Medications   Discharge Prescriptions    No medications on file     No discharge procedures on file      ED Provider  Electronically Signed by           Ángel Gaona PA-C  10/12/18 5774

## 2018-10-12 NOTE — ED NOTES
Lab unable to add on GC/Chlamydia, will obtain additional specimen        Messi Fletcher RN  10/12/18 7047

## 2018-10-12 NOTE — DISCHARGE INSTRUCTIONS
Miscarriage   WHAT YOU NEED TO KNOW:   A miscarriage is the loss of a fetus within the first 20 weeks of pregnancy  A miscarriage may also be called a spontaneous  or an early pregnancy loss  DISCHARGE INSTRUCTIONS:   Return to the emergency department if:   · You have foul-smelling drainage or pus coming from your vagina  · You have heavy vaginal bleeding and soak 1 pad or more in an hour  · You have severe abdominal pain  · You feel like your heart is beating faster than normal      · You feel extremely weak or dizzy  Contact your healthcare provider if:   · You have a fever greater than 100 4°F or chills  · You have extreme sadness, grief, or feel unable to cope with what has happened  · You have questions or concerns about your condition or care  Self-care:   · Do not put anything in your vagina for 2 weeks or as directed  Do not use tampons, douche, or have sex  These actions can cause infection and pain  · Use sanitary pads as needed  You may have light bleeding or spotting for 2 weeks  · Do not take a bath or go swimming for 2 weeks or as directed  These actions may increase your risk for an infection  Take showers only  · Rest as needed  Slowly start to do more each day  Return to your daily activities as directed  · Talk to your healthcare provider about birth control  If you would like to prevent another pregnancy, ask your healthcare provider which type of birth control is best for you  · Join a support group or therapy to help you cope  A miscarriage may be very difficult for you, your partner, and other members of your family  There is no right way to feel after a miscarriage  You may feel overwhelming grief or other emotions  It may be helpful to talk to a friend, family member, or counselor about your feelings  You may worry that you could have another miscarriage  Talk to your healthcare provider about your concerns   He may be able to help you reduce the risk for another miscarriage  He may also help you find ways to cope with grief  For more information:   · [employer]  [address] [place] [address]  Phone: [phone]  Phone: [phone]  Web Address: http://[employer]/  org  · [facility]  [address] [facility] , [address] [place] [address]  Web Address: [employer]  Follow up with your healthcare provider as directed: You may need to see your healthcare provider for blood tests or an ultrasound  Write down your questions so you remember to ask them during your visits  © 2017 [facility] Information is for End User's use only and may not be sold, redistributed or otherwise used for commercial purposes  All illustrations and images included in CareNotes® are the copyrighted property of A D A M , Inc  or Iam Isidro  The above information is an  only  It is not intended as medical advice for individual conditions or treatments  Talk to your doctor, nurse or pharmacist before following any medical regimen to see if it is safe and effective for you

## 2018-10-15 LAB
BACTERIA UR CULT: ABNORMAL

## 2019-03-29 ENCOUNTER — APPOINTMENT (EMERGENCY)
Dept: RADIOLOGY | Facility: HOSPITAL | Age: 29
End: 2019-03-29
Payer: COMMERCIAL

## 2019-03-29 ENCOUNTER — HOSPITAL ENCOUNTER (EMERGENCY)
Facility: HOSPITAL | Age: 29
Discharge: HOME | End: 2019-03-29
Attending: EMERGENCY MEDICINE | Admitting: EMERGENCY MEDICINE
Payer: COMMERCIAL

## 2019-03-29 VITALS
RESPIRATION RATE: 16 BRPM | BODY MASS INDEX: 35.33 KG/M2 | DIASTOLIC BLOOD PRESSURE: 75 MMHG | WEIGHT: 192 LBS | TEMPERATURE: 98.3 F | SYSTOLIC BLOOD PRESSURE: 133 MMHG | HEIGHT: 62 IN | HEART RATE: 103 BPM | OXYGEN SATURATION: 100 %

## 2019-03-29 DIAGNOSIS — S62.309A METACARPAL BONE FRACTURE: Primary | ICD-10-CM

## 2019-03-29 PROCEDURE — 73130 X-RAY EXAM OF HAND: CPT

## 2019-03-29 PROCEDURE — 99284 EMERGENCY DEPT VISIT MOD MDM: CPT

## 2019-03-29 RX ORDER — IBUPROFEN 400 MG/1
800 TABLET ORAL ONCE
Status: COMPLETED | OUTPATIENT
Start: 2019-03-29 | End: 2019-03-29

## 2019-03-29 RX ADMIN — IBUPROFEN 800 MG: 400 TABLET ORAL at 09:15

## 2019-06-04 ENCOUNTER — HOSPITAL ENCOUNTER (EMERGENCY)
Facility: HOSPITAL | Age: 29
Discharge: HOME | End: 2019-06-04
Attending: EMERGENCY MEDICINE | Admitting: EMERGENCY MEDICINE
Payer: COMMERCIAL

## 2019-06-04 VITALS
WEIGHT: 189.6 LBS | OXYGEN SATURATION: 100 % | BODY MASS INDEX: 34.68 KG/M2 | RESPIRATION RATE: 16 BRPM | SYSTOLIC BLOOD PRESSURE: 134 MMHG | HEART RATE: 57 BPM | TEMPERATURE: 98.5 F | DIASTOLIC BLOOD PRESSURE: 57 MMHG

## 2019-06-04 DIAGNOSIS — O20.0 THREATENED ABORTION: Primary | ICD-10-CM

## 2019-06-04 LAB
ANION GAP SERPL CALCULATED.3IONS-SCNC: 9 MMOL/L (ref 4–13)
B-HCG SERPL-ACNC: 146 MIU/ML
BASOPHILS # BLD AUTO: 0.04 THOUSANDS/ΜL (ref 0–0.1)
BASOPHILS NFR BLD AUTO: 1 % (ref 0–1)
BILIRUB UR QL STRIP: NEGATIVE
BUN SERPL-MCNC: 8 MG/DL (ref 5–25)
CALCIUM SERPL-MCNC: 8.8 MG/DL (ref 8.3–10.1)
CHLORIDE SERPL-SCNC: 103 MMOL/L (ref 100–108)
CLARITY UR: NORMAL
CO2 SERPL-SCNC: 26 MMOL/L (ref 21–32)
COLOR UR: NORMAL
CREAT SERPL-MCNC: 0.79 MG/DL (ref 0.6–1.3)
EOSINOPHIL # BLD AUTO: 0.17 THOUSAND/ΜL (ref 0–0.61)
EOSINOPHIL NFR BLD AUTO: 2 % (ref 0–6)
ERYTHROCYTE [DISTWIDTH] IN BLOOD BY AUTOMATED COUNT: 19.4 % (ref 11.6–15.1)
EXT PREG TEST URINE: POSITIVE
GFR SERPL CREATININE-BSD FRML MDRD: 119 ML/MIN/1.73SQ M
GLUCOSE SERPL-MCNC: 69 MG/DL (ref 65–140)
GLUCOSE UR STRIP-MCNC: NEGATIVE MG/DL
HCT VFR BLD AUTO: 34.6 % (ref 34.8–46.1)
HGB BLD-MCNC: 10.1 G/DL (ref 11.5–15.4)
HGB UR QL STRIP.AUTO: NEGATIVE
IMM GRANULOCYTES # BLD AUTO: 0.01 THOUSAND/UL (ref 0–0.2)
IMM GRANULOCYTES NFR BLD AUTO: 0 % (ref 0–2)
KETONES UR STRIP-MCNC: NEGATIVE MG/DL
LEUKOCYTE ESTERASE UR QL STRIP: NEGATIVE
LYMPHOCYTES # BLD AUTO: 1.7 THOUSANDS/ΜL (ref 0.6–4.47)
LYMPHOCYTES NFR BLD AUTO: 20 % (ref 14–44)
MCH RBC QN AUTO: 23.5 PG (ref 26.8–34.3)
MCHC RBC AUTO-ENTMCNC: 29.2 G/DL (ref 31.4–37.4)
MCV RBC AUTO: 81 FL (ref 82–98)
MONOCYTES # BLD AUTO: 0.92 THOUSAND/ΜL (ref 0.17–1.22)
MONOCYTES NFR BLD AUTO: 11 % (ref 4–12)
NEUTROPHILS # BLD AUTO: 5.74 THOUSANDS/ΜL (ref 1.85–7.62)
NEUTS SEG NFR BLD AUTO: 66 % (ref 43–75)
NITRITE UR QL STRIP: NEGATIVE
NRBC BLD AUTO-RTO: 0 /100 WBCS
PH UR STRIP.AUTO: 6.5 [PH]
PLATELET # BLD AUTO: 397 THOUSANDS/UL (ref 149–390)
PMV BLD AUTO: 10.2 FL (ref 8.9–12.7)
POTASSIUM SERPL-SCNC: 3.7 MMOL/L (ref 3.5–5.3)
PROT UR STRIP-MCNC: NEGATIVE MG/DL
RBC # BLD AUTO: 4.29 MILLION/UL (ref 3.81–5.12)
SODIUM SERPL-SCNC: 138 MMOL/L (ref 136–145)
SP GR UR STRIP.AUTO: 1.01 (ref 1–1.03)
UROBILINOGEN UR QL STRIP.AUTO: 1 E.U./DL
WBC # BLD AUTO: 8.58 THOUSAND/UL (ref 4.31–10.16)

## 2019-06-04 PROCEDURE — 81025 URINE PREGNANCY TEST: CPT | Performed by: EMERGENCY MEDICINE

## 2019-06-04 PROCEDURE — 80048 BASIC METABOLIC PNL TOTAL CA: CPT | Performed by: EMERGENCY MEDICINE

## 2019-06-04 PROCEDURE — 81003 URINALYSIS AUTO W/O SCOPE: CPT | Performed by: EMERGENCY MEDICINE

## 2019-06-04 PROCEDURE — 99284 EMERGENCY DEPT VISIT MOD MDM: CPT

## 2019-06-04 PROCEDURE — 84702 CHORIONIC GONADOTROPIN TEST: CPT | Performed by: EMERGENCY MEDICINE

## 2019-06-04 PROCEDURE — 36415 COLL VENOUS BLD VENIPUNCTURE: CPT | Performed by: EMERGENCY MEDICINE

## 2019-06-04 PROCEDURE — 85025 COMPLETE CBC W/AUTO DIFF WBC: CPT | Performed by: EMERGENCY MEDICINE

## 2019-06-11 ENCOUNTER — OFFICE VISIT (OUTPATIENT)
Dept: FAMILY MEDICINE CLINIC | Facility: CLINIC | Age: 29
End: 2019-06-11
Payer: COMMERCIAL

## 2019-06-11 VITALS
WEIGHT: 185 LBS | TEMPERATURE: 98.1 F | BODY MASS INDEX: 34.04 KG/M2 | DIASTOLIC BLOOD PRESSURE: 82 MMHG | SYSTOLIC BLOOD PRESSURE: 110 MMHG | RESPIRATION RATE: 20 BRPM | HEART RATE: 89 BPM | HEIGHT: 62 IN | OXYGEN SATURATION: 99 %

## 2019-06-11 DIAGNOSIS — Z32.01 POSITIVE BLOOD PREGNANCY TEST: Primary | ICD-10-CM

## 2019-06-11 LAB — HCG INTACT+B SERPL-ACNC: 3085 MIU/ML

## 2019-06-11 PROCEDURE — 99213 OFFICE O/P EST LOW 20 MIN: CPT | Performed by: FAMILY MEDICINE

## 2019-06-13 LAB
HCG INTACT+B SERPL-ACNC: 7208 MIU/ML
PROGEST SERPL-MCNC: 41.3 NG/ML

## 2019-06-14 ENCOUNTER — TELEPHONE (OUTPATIENT)
Dept: FAMILY MEDICINE CLINIC | Facility: CLINIC | Age: 29
End: 2019-06-14

## 2019-06-14 DIAGNOSIS — Z32.01 POSITIVE BLOOD PREGNANCY TEST: Primary | ICD-10-CM

## 2019-06-24 ENCOUNTER — INITIAL PRENATAL (OUTPATIENT)
Dept: FAMILY MEDICINE CLINIC | Facility: CLINIC | Age: 29
End: 2019-06-24
Payer: COMMERCIAL

## 2019-06-24 VITALS — BODY MASS INDEX: 34.2 KG/M2 | SYSTOLIC BLOOD PRESSURE: 110 MMHG | DIASTOLIC BLOOD PRESSURE: 70 MMHG | WEIGHT: 187 LBS

## 2019-06-24 DIAGNOSIS — Z32.01 POSITIVE BLOOD PREGNANCY TEST: Primary | ICD-10-CM

## 2019-06-24 DIAGNOSIS — Z3A.08 8 WEEKS GESTATION OF PREGNANCY: Primary | ICD-10-CM

## 2019-06-24 PROCEDURE — 99214 OFFICE O/P EST MOD 30 MIN: CPT | Performed by: FAMILY MEDICINE

## 2019-06-24 RX ORDER — PNV NO.95/FERROUS FUM/FOLIC AC 28MG-0.8MG
TABLET ORAL
COMMUNITY
End: 2020-04-08

## 2019-06-25 ENCOUNTER — INITIAL PRENATAL (OUTPATIENT)
Dept: FAMILY MEDICINE CLINIC | Facility: CLINIC | Age: 29
End: 2019-06-25

## 2019-06-25 VITALS
SYSTOLIC BLOOD PRESSURE: 118 MMHG | WEIGHT: 187.44 LBS | DIASTOLIC BLOOD PRESSURE: 68 MMHG | BODY MASS INDEX: 34.28 KG/M2

## 2019-06-25 DIAGNOSIS — A74.9 CHLAMYDIA INFECTION AFFECTING PREGNANCY IN FIRST TRIMESTER: ICD-10-CM

## 2019-06-25 DIAGNOSIS — Z34.90 PRENATAL CARE, ANTEPARTUM: Primary | ICD-10-CM

## 2019-06-25 DIAGNOSIS — Z11.3 SCREENING FOR STD (SEXUALLY TRANSMITTED DISEASE): ICD-10-CM

## 2019-06-25 DIAGNOSIS — Z12.4 SCREENING FOR CERVICAL CANCER: ICD-10-CM

## 2019-06-25 DIAGNOSIS — O98.811 CHLAMYDIA INFECTION AFFECTING PREGNANCY IN FIRST TRIMESTER: ICD-10-CM

## 2019-06-25 DIAGNOSIS — E66.9 CLASS 1 OBESITY WITH BODY MASS INDEX (BMI) OF 34.0 TO 34.9 IN ADULT, UNSPECIFIED OBESITY TYPE, UNSPECIFIED WHETHER SERIOUS COMORBIDITY PRESENT: ICD-10-CM

## 2019-06-25 PROCEDURE — 0501F PRENATAL FLOW SHEET: CPT | Performed by: FAMILY MEDICINE

## 2019-06-27 LAB
CYTOLOGIST CVX/VAG CYTO: NORMAL
DX ICD CODE: NORMAL
GLUCOSE 1H P 50 G GLC PO SERPL-MCNC: 153 MG/DL (ref 65–139)
Lab: NORMAL
OTHER STN SPEC: NORMAL
OTHER STN SPEC: NORMAL
PATH REPORT.FINAL DX SPEC: NORMAL
SL AMB NOTE:: NORMAL
SL AMB SPECIMEN ADEQUACY: NORMAL
SL AMB TEST METHODOLOGY: NORMAL

## 2019-06-29 LAB
AMPHETAMINES UR QL SCN: NEGATIVE NG/ML
APPEARANCE UR: CLEAR
BACTERIA GENITAL AEROBE CULT: NORMAL
BACTERIA UR CULT: NORMAL
BARBITURATES UR QL SCN: NEGATIVE NG/ML
BENZODIAZ UR QL: NEGATIVE NG/ML
BILIRUB UR QL STRIP: NEGATIVE
BZE UR QL: NEGATIVE NG/ML
C TRACH RRNA SPEC QL NAA+PROBE: POSITIVE
CANNABINOIDS UR QL SCN: NEGATIVE NG/ML
COLOR UR: YELLOW
GLUCOSE UR QL: NEGATIVE
HGB UR QL STRIP: NEGATIVE
KETONES UR QL STRIP: NEGATIVE
LEUKOCYTE ESTERASE UR QL STRIP: NEGATIVE
Lab: NO GROWTH
Lab: NORMAL
METHADONE UR QL SCN: NEGATIVE NG/ML
MICRO URNS: NORMAL
N GONORRHOEA RRNA SPEC QL NAA+PROBE: NEGATIVE
NITRITE UR QL STRIP: NEGATIVE
OPIATES UR QL: NEGATIVE NG/ML
PCP UR QL: NEGATIVE NG/ML
PH UR STRIP: 7.5 [PH] (ref 5–7.5)
PROPOXYPH UR QL SCN: NEGATIVE NG/ML
PROT UR QL STRIP: NEGATIVE
SP GR UR: 1.02 (ref 1–1.03)
UROBILINOGEN UR STRIP-ACNC: 1 EU/DL (ref 0.2–1)

## 2019-07-01 LAB
ABO GROUP BLD: ABNORMAL
BASOPHILS # BLD AUTO: 0 X10E3/UL (ref 0–0.2)
BASOPHILS NFR BLD AUTO: 0 %
BLD GP AB SCN SERPL QL: ABNORMAL
BLOOD GROUP ANTIBODIES SERPL: NORMAL
CF COMMENT: NORMAL
CFTR MUT ANL BLD/T: NORMAL
DEPRECATED HGB OTHER BLD-IMP: 0 %
EOSINOPHIL # BLD AUTO: 0.2 X10E3/UL (ref 0–0.4)
EOSINOPHIL NFR BLD AUTO: 2 %
ERYTHROCYTE [DISTWIDTH] IN BLOOD BY AUTOMATED COUNT: 20.1 % (ref 12.3–15.4)
HBV SURFACE AG SERPL QL IA: NEGATIVE
HCT VFR BLD AUTO: 30.4 % (ref 34–46.6)
HGB A MFR BLD: 1.7 % (ref 1.8–3.2)
HGB A MFR BLD: 98.3 % (ref 96.4–98.8)
HGB BLD-MCNC: 9.4 G/DL (ref 11.1–15.9)
HGB C MFR BLD: 0 %
HGB F MFR BLD: 0 % (ref 0–2)
HGB FRACT BLD-IMP: ABNORMAL
HGB S BLD QL SOLY: NEGATIVE
HGB S MFR BLD: 0 %
HIV 1+2 AB+HIV1 P24 AG SERPL QL IA: NON REACTIVE
IMM GRANULOCYTES # BLD: 0 X10E3/UL (ref 0–0.1)
IMM GRANULOCYTES NFR BLD: 0 %
LYMPHOCYTES # BLD AUTO: 2.3 X10E3/UL (ref 0.7–3.1)
LYMPHOCYTES NFR BLD AUTO: 30 %
MCH RBC QN AUTO: 24.2 PG (ref 26.6–33)
MCHC RBC AUTO-ENTMCNC: 30.9 G/DL (ref 31.5–35.7)
MCV RBC AUTO: 78 FL (ref 79–97)
MONOCYTES # BLD AUTO: 0.5 X10E3/UL (ref 0.1–0.9)
MONOCYTES NFR BLD AUTO: 6 %
NEUTROPHILS # BLD AUTO: 4.7 X10E3/UL (ref 1.4–7)
NEUTROPHILS NFR BLD AUTO: 62 %
PLATELET # BLD AUTO: 423 X10E3/UL (ref 150–450)
RBC # BLD AUTO: 3.88 X10E6/UL (ref 3.77–5.28)
RH BLD: POSITIVE
RPR SER QL: NON REACTIVE
RUBV IGG SERPL IA-ACNC: 7.84 INDEX
SL AMB ANTIBODY SCREEN: NEGATIVE
TSH SERPL DL<=0.005 MIU/L-ACNC: 0.52 UIU/ML (ref 0.45–4.5)
WBC # BLD AUTO: 7.7 X10E3/UL (ref 3.4–10.8)

## 2019-07-01 RX ORDER — AZITHROMYCIN 500 MG/1
1000 TABLET, FILM COATED ORAL DAILY
Qty: 1 TABLET | Refills: 0 | Status: SHIPPED | OUTPATIENT
Start: 2019-07-01 | End: 2019-07-02

## 2019-07-08 ENCOUNTER — ULTRASOUND (OUTPATIENT)
Dept: PERINATAL CARE | Facility: CLINIC | Age: 29
End: 2019-07-08
Payer: COMMERCIAL

## 2019-07-08 VITALS
WEIGHT: 189.4 LBS | HEIGHT: 62 IN | SYSTOLIC BLOOD PRESSURE: 127 MMHG | BODY MASS INDEX: 34.85 KG/M2 | DIASTOLIC BLOOD PRESSURE: 73 MMHG | HEART RATE: 101 BPM

## 2019-07-08 DIAGNOSIS — Z3A.09 9 WEEKS GESTATION OF PREGNANCY: Primary | ICD-10-CM

## 2019-07-08 DIAGNOSIS — O02.1 MISSED ABORTION WITH FETAL DEMISE BEFORE 20 COMPLETED WEEKS OF GESTATION: ICD-10-CM

## 2019-07-08 PROCEDURE — 76801 OB US < 14 WKS SINGLE FETUS: CPT | Performed by: OBSTETRICS & GYNECOLOGY

## 2019-07-08 NOTE — PROGRESS NOTES
Ob ultrasound report: An ultrasound for viability, dating  was completed today  See Ob Procedures in EPIC  1  No fetal heart activity was observed today using transabdominal and transvaginal scanning  2  CRL= 1 9 cm = 8 weeks 3 days  I reviewed the results of this ultrasound with Ms Mynor Pierson and indicated we cannot speculate about an etiology for these findings  Understandably, she was not interested in having a long conversation today, except to let me know she had a similar experience in November 2018 when she needed a D/E  I recommended she discusses with you alternatives for management of  pregnancy loss       Oneal Fleming MD

## 2019-07-09 ENCOUNTER — OFFICE VISIT (OUTPATIENT)
Dept: FAMILY MEDICINE CLINIC | Facility: CLINIC | Age: 29
End: 2019-07-09
Payer: COMMERCIAL

## 2019-07-09 VITALS
DIASTOLIC BLOOD PRESSURE: 60 MMHG | SYSTOLIC BLOOD PRESSURE: 102 MMHG | HEIGHT: 62 IN | HEART RATE: 96 BPM | WEIGHT: 186 LBS | BODY MASS INDEX: 34.23 KG/M2 | TEMPERATURE: 98.9 F | OXYGEN SATURATION: 100 %

## 2019-07-09 DIAGNOSIS — A74.9 CHLAMYDIA: ICD-10-CM

## 2019-07-09 DIAGNOSIS — O02.1 MISSED ABORTION WITH FETAL DEMISE BEFORE 20 COMPLETED WEEKS OF GESTATION: Primary | ICD-10-CM

## 2019-07-09 PROCEDURE — 3008F BODY MASS INDEX DOCD: CPT | Performed by: OBSTETRICS & GYNECOLOGY

## 2019-07-09 PROCEDURE — 99213 OFFICE O/P EST LOW 20 MIN: CPT | Performed by: OBSTETRICS & GYNECOLOGY

## 2019-07-09 RX ORDER — AZITHROMYCIN 500 MG/1
1000 TABLET, FILM COATED ORAL ONCE
Qty: 2 TABLET | Refills: 0 | Status: SHIPPED | OUTPATIENT
Start: 2019-07-09 | End: 2019-07-10 | Stop reason: HOSPADM

## 2019-07-09 NOTE — H&P (VIEW-ONLY)
Patient to be scheduled for D&E 7/10/19 at 1pm with Dr Karel Richard - Preoperative H&P as follows:    [id number] Note  [facility]     Gabriela Molina MRN: [id number] : 1990 Age: 33 y o  Assessment/Plan        1  Missed  with fetal demise before 21 completed weeks of gestation     2  Chlamydia  azithromycin (ZITHROMAX) 500 MG tablet       Patient with missed  as diagnosed on ultrasound yesterday with no fetal cardiac activity detected, final dating 8w 3d  Today requesting surgical treatment as she feels she cannot bear the pain and bleeding that she experienced with miscarriage last November  As discussed with Dr Tripp Proctor in office today, Dr Karel Richard will be able to perform D&E tomorrow at 1pm  Nothing to eat or drink after midnight  Exam today normal  She is also interested in tubal ligation, discussed that per policy this would need 30 days notice  Gabriela Molina acknowledged understanding of treatment plan, all questions answered  Plan discussed with attending physician Dr Tripp Proctor present in office and case discussed with Dr Karel Richard over phone  Subjective      Gabriela Molina is a 33 y o  female  [phone] correct cell phone, incorrect in chart, updated in Epic today  Presents today for follow up  Yesterday 20 had OB ultrasound for dating which showed no fetal cardiac motion, CRL= 1 9 cm = 8 weeks 3 days  Other kids: 15, 5, 6, 5, 4,2yo - all  except last was a CS  Previously  had miscarriage - had to get D&C due to excessive bleeding, pain was very severe, was in ED throughout    Previously in  had ruptured ectopic   Another early miscarriage in  - didn't know she was pregnant    in 2015, elective    Reports this pregnancy never felt quite right - more nausea, stomach pain  Denies any bleeding or cramping       Today requesting surgical intervention as she cannot bear the pain of going through the cramping and bleeding like she did in November  She is also interested at this time in getting her tubes tied  On review of her last labs , was positive for chlamydia  Office staff was unable to reach her by phone to inform her of this  She is understandably upset by this as she denies any previous history of STD and has only been sexually active with her   OB History        11    Para   6    Term   6       0    AB   4    Living   6       SAB   1    TAB   0    Ectopic   1    Multiple   0    Live Births   6           Obstetric Comments   Ex lap             The following portions of the patient's history were reviewed and updated as appropriate: allergies, current medications, past family history, past medical history, past social history, past surgical history and problem list     Review of Systems   Constitutional: Negative for appetite change, chills and fever  HENT: Negative for congestion, rhinorrhea and sore throat  Eyes: Negative for visual disturbance  Respiratory: Negative for cough, shortness of breath and wheezing  Cardiovascular: Negative for chest pain and leg swelling  Gastrointestinal: Positive for nausea  Negative for abdominal pain, constipation, diarrhea and vomiting  Genitourinary: Negative for dysuria and frequency  Musculoskeletal: Negative for arthralgias and myalgias  Skin: Negative for rash  Neurological: Negative for dizziness and headaches            Past Medical History:   Diagnosis Date    Anemia     B[facility] 1 disorder ([facility] [address] )     Obesity     Psychiatric disorder     bipolar    Varicella     Child Hx   PMH anemia - hemoglobin this year has ranged 9 4, 10 1    Current Outpatient Medications   Medication Sig Dispense Refill    azithromycin (ZITHROMAX) 500 MG tablet Take 2 tablets (1,000 mg total) by mouth once for 1 dose 2 tablet 0    Prenatal Vit-Fe Fumarate-FA (PRENATAL VITAMIN) 28-0 8 mg Take by mouth       No current facility-administered medications for this visit  Objective      /60   Pulse 96   Temp 98 9 °F (37 2 °C)   Ht 5' 2" (1 575 m)   Wt 84 4 kg (186 lb)   LMP 05/01/2019 (Exact Date)   SpO2 100%   BMI 34 02 kg/m²     Physical Exam   Constitutional: She is oriented to person, place, and time  She appears well-developed and well-nourished  HENT:   Head: Normocephalic and atraumatic  Mouth/Throat: Oropharynx is clear and moist    Eyes: Conjunctivae and EOM are normal    Cardiovascular: Normal rate, regular rhythm and normal heart sounds  No murmur heard  Pulmonary/Chest: Effort normal and breath sounds normal  No respiratory distress  She has no wheezes  Abdominal: Soft  Bowel sounds are normal  She exhibits no distension  There is no tenderness  Musculoskeletal: Normal range of motion  She exhibits no edema or deformity  Neurological: She is alert and oriented to person, place, and time  Skin: Skin is warm and dry  Psychiatric: She has a normal mood and affect  Vitals reviewed

## 2019-07-09 NOTE — LETTER
July 9, 2019     Patient: Jennifer Marquez   YOB: 1990   Date of Visit: 7/9/2019       To Whom it May Concern:    Jennifer Marquez is under my professional care  She was seen in my office on 7/9/2019  She may return to work on 7/15/19  If you have any questions or concerns, please don't hesitate to call           Sincerely,          Tiana Estimable, DO        CC: No Recipients

## 2019-07-09 NOTE — PROGRESS NOTES
Patient to be scheduled for D&E 7/10/19 at 1pm with Dr Faustina Pruett - Preoperative H&P as follows:    [id number] Note  Idalmis Sy, [facility], 19     Profirio Delgado MRN: [id number] : 1990 Age: 33 y o  Assessment/Plan        1  Missed  with fetal demise before 21 completed weeks of gestation     2  Chlamydia  azithromycin (ZITHROMAX) 500 MG tablet       Patient with missed  as diagnosed on ultrasound yesterday with no fetal cardiac activity detected, final dating 8w 3d  Today requesting surgical treatment as she feels she cannot bear the pain and bleeding that she experienced with miscarriage last November  As discussed with Dr Evelin Murphy in office today, Dr Faustina Pruett will be able to perform D&E tomorrow at 1pm  Nothing to eat or drink after midnight  Exam today normal  She is also interested in tubal ligation, discussed that per policy this would need 30 days notice  Porfirio Delgado acknowledged understanding of treatment plan, all questions answered  Plan discussed with attending physician Dr Evelin Murphy present in office and case discussed with Dr Faustina Pruett over phone  Subjective      Porfirio Delgado is a 33 y o  female  [phone] correct cell phone, incorrect in chart, updated in Epic today  Presents today for follow up  Yesterday 20 had OB ultrasound for dating which showed no fetal cardiac motion, CRL= 1 9 cm = 8 weeks 3 days  Other kids: 15, 5, 6, 5, 4,2yo - all  except last was a CS  Previously  had miscarriage - had to get D&C due to excessive bleeding, pain was very severe, was in ED throughout    Previously in  had ruptured ectopic   Another early miscarriage in  - didn't know she was pregnant    in 2015, elective    Reports this pregnancy never felt quite right - more nausea, stomach pain  Denies any bleeding or cramping       Today requesting surgical intervention as she cannot bear the pain of going through the cramping and bleeding like she did in November  She is also interested at this time in getting her tubes tied  On review of her last labs , was positive for chlamydia  Office staff was unable to reach her by phone to inform her of this  She is understandably upset by this as she denies any previous history of STD and has only been sexually active with her   OB History        11    Para   6    Term   6       0    AB   4    Living   6       SAB   1    TAB   0    Ectopic   1    Multiple   0    Live Births   6           Obstetric Comments   Ex lap             The following portions of the patient's history were reviewed and updated as appropriate: allergies, current medications, past family history, past medical history, past social history, past surgical history and problem list     Review of Systems   Constitutional: Negative for appetite change, chills and fever  HENT: Negative for congestion, rhinorrhea and sore throat  Eyes: Negative for visual disturbance  Respiratory: Negative for cough, shortness of breath and wheezing  Cardiovascular: Negative for chest pain and leg swelling  Gastrointestinal: Positive for nausea  Negative for abdominal pain, constipation, diarrhea and vomiting  Genitourinary: Negative for dysuria and frequency  Musculoskeletal: Negative for arthralgias and myalgias  Skin: Negative for rash  Neurological: Negative for dizziness and headaches            Past Medical History:   Diagnosis Date    Anemia     Bipolar 1 disorder ([facility] [address] )     Obesity     Psychiatric disorder     bipolar    Varicella     Child Hx   PMH anemia - hemoglobin this year has ranged 9 4, 10 1    Current Outpatient Medications   Medication Sig Dispense Refill    azithromycin (ZITHROMAX) 500 MG tablet Take 2 tablets (1,000 mg total) by mouth once for 1 dose 2 tablet 0    Prenatal Vit-Fe Fumarate-FA (PRENATAL VITAMIN) 28-0 8 mg Take by mouth       No current facility-administered medications for this visit  Objective      /60   Pulse 96   Temp 98 9 °F (37 2 °C)   Ht 5' 2" (1 575 m)   Wt 84 4 kg (186 lb)   LMP 05/01/2019 (Exact Date)   SpO2 100%   BMI 34 02 kg/m²     Physical Exam   Constitutional: She is oriented to person, place, and time  She appears well-developed and well-nourished  HENT:   Head: Normocephalic and atraumatic  Mouth/Throat: Oropharynx is clear and moist    Eyes: Conjunctivae and EOM are normal    Cardiovascular: Normal rate, regular rhythm and normal heart sounds  No murmur heard  Pulmonary/Chest: Effort normal and breath sounds normal  No respiratory distress  She has no wheezes  Abdominal: Soft  Bowel sounds are normal  She exhibits no distension  There is no tenderness  Musculoskeletal: Normal range of motion  She exhibits no edema or deformity  Neurological: She is alert and oriented to person, place, and time  Skin: Skin is warm and dry  Psychiatric: She has a normal mood and affect  Vitals reviewed

## 2019-07-10 ENCOUNTER — ANESTHESIA EVENT (OUTPATIENT)
Dept: PERIOP | Facility: HOSPITAL | Age: 29
End: 2019-07-10
Payer: COMMERCIAL

## 2019-07-10 ENCOUNTER — ANESTHESIA (OUTPATIENT)
Dept: PERIOP | Facility: HOSPITAL | Age: 29
End: 2019-07-10
Payer: COMMERCIAL

## 2019-07-10 ENCOUNTER — HOSPITAL ENCOUNTER (OUTPATIENT)
Facility: HOSPITAL | Age: 29
Setting detail: OUTPATIENT SURGERY
Discharge: HOME | End: 2019-07-10
Attending: OBSTETRICS & GYNECOLOGY | Admitting: OBSTETRICS & GYNECOLOGY
Payer: COMMERCIAL

## 2019-07-10 VITALS
DIASTOLIC BLOOD PRESSURE: 79 MMHG | OXYGEN SATURATION: 98 % | HEART RATE: 80 BPM | SYSTOLIC BLOOD PRESSURE: 123 MMHG | TEMPERATURE: 97.3 F | RESPIRATION RATE: 20 BRPM

## 2019-07-10 DIAGNOSIS — O02.1 MISSED ABORTION: Primary | ICD-10-CM

## 2019-07-10 PROBLEM — Z32.01 POSITIVE BLOOD PREGNANCY TEST: Status: RESOLVED | Noted: 2019-06-11 | Resolved: 2019-07-10

## 2019-07-10 PROBLEM — Z3A.09 9 WEEKS GESTATION OF PREGNANCY: Status: RESOLVED | Noted: 2019-07-08 | Resolved: 2019-07-10

## 2019-07-10 PROCEDURE — 59820 CARE OF MISCARRIAGE: CPT | Performed by: OBSTETRICS & GYNECOLOGY

## 2019-07-10 PROCEDURE — 88305 TISSUE EXAM BY PATHOLOGIST: CPT | Performed by: PATHOLOGY

## 2019-07-10 RX ORDER — MISOPROSTOL 100 UG/1
600 TABLET ORAL ONCE
Status: DISCONTINUED | OUTPATIENT
Start: 2019-07-10 | End: 2019-07-10 | Stop reason: HOSPADM

## 2019-07-10 RX ORDER — MIDAZOLAM HYDROCHLORIDE 1 MG/ML
INJECTION INTRAMUSCULAR; INTRAVENOUS AS NEEDED
Status: DISCONTINUED | OUTPATIENT
Start: 2019-07-10 | End: 2019-07-11 | Stop reason: SURG

## 2019-07-10 RX ORDER — OXYCODONE HYDROCHLORIDE 5 MG/1
10 TABLET ORAL EVERY 4 HOURS PRN
Status: DISCONTINUED | OUTPATIENT
Start: 2019-07-10 | End: 2019-07-10 | Stop reason: HOSPADM

## 2019-07-10 RX ORDER — DEXAMETHASONE SODIUM PHOSPHATE 4 MG/ML
INJECTION, SOLUTION INTRA-ARTICULAR; INTRALESIONAL; INTRAMUSCULAR; INTRAVENOUS; SOFT TISSUE AS NEEDED
Status: DISCONTINUED | OUTPATIENT
Start: 2019-07-10 | End: 2019-07-11 | Stop reason: SURG

## 2019-07-10 RX ORDER — ONDANSETRON 2 MG/ML
INJECTION INTRAMUSCULAR; INTRAVENOUS AS NEEDED
Status: DISCONTINUED | OUTPATIENT
Start: 2019-07-10 | End: 2019-07-11 | Stop reason: SURG

## 2019-07-10 RX ORDER — SODIUM CHLORIDE, SODIUM LACTATE, POTASSIUM CHLORIDE, CALCIUM CHLORIDE 600; 310; 30; 20 MG/100ML; MG/100ML; MG/100ML; MG/100ML
125 INJECTION, SOLUTION INTRAVENOUS CONTINUOUS
Status: DISCONTINUED | OUTPATIENT
Start: 2019-07-10 | End: 2019-07-10

## 2019-07-10 RX ORDER — MISOPROSTOL 100 UG/1
TABLET ORAL AS NEEDED
Status: DISCONTINUED | OUTPATIENT
Start: 2019-07-10 | End: 2019-07-10 | Stop reason: HOSPADM

## 2019-07-10 RX ORDER — MISOPROSTOL 200 UG/1
400 TABLET ORAL ONCE
Qty: 3 TABLET | Refills: 0
Start: 2019-07-10 | End: 2019-07-10 | Stop reason: HOSPADM

## 2019-07-10 RX ORDER — DOXYCYCLINE HYCLATE 100 MG/1
100 CAPSULE ORAL ONCE
Status: COMPLETED | OUTPATIENT
Start: 2019-07-10 | End: 2019-07-10

## 2019-07-10 RX ORDER — ONDANSETRON 2 MG/ML
4 INJECTION INTRAMUSCULAR; INTRAVENOUS ONCE
Status: COMPLETED | OUTPATIENT
Start: 2019-07-10 | End: 2019-07-10

## 2019-07-10 RX ORDER — ACETAMINOPHEN 325 MG/1
975 TABLET ORAL ONCE
Status: COMPLETED | OUTPATIENT
Start: 2019-07-10 | End: 2019-07-10

## 2019-07-10 RX ORDER — ONDANSETRON 2 MG/ML
4 INJECTION INTRAMUSCULAR; INTRAVENOUS EVERY 6 HOURS PRN
Status: DISCONTINUED | OUTPATIENT
Start: 2019-07-10 | End: 2019-07-10 | Stop reason: HOSPADM

## 2019-07-10 RX ORDER — HYDROMORPHONE HCL/PF 1 MG/ML
0.5 SYRINGE (ML) INJECTION
Status: DISCONTINUED | OUTPATIENT
Start: 2019-07-10 | End: 2019-07-10 | Stop reason: HOSPADM

## 2019-07-10 RX ORDER — DOXYCYCLINE HYCLATE 100 MG/1
200 CAPSULE ORAL ONCE
Refills: 0
Start: 2019-07-10 | End: 2019-07-10

## 2019-07-10 RX ORDER — ACETAMINOPHEN 325 MG/1
650 TABLET ORAL EVERY 4 HOURS PRN
Status: DISCONTINUED | OUTPATIENT
Start: 2019-07-10 | End: 2019-07-10 | Stop reason: HOSPADM

## 2019-07-10 RX ORDER — SODIUM CHLORIDE, SODIUM LACTATE, POTASSIUM CHLORIDE, CALCIUM CHLORIDE 600; 310; 30; 20 MG/100ML; MG/100ML; MG/100ML; MG/100ML
125 INJECTION, SOLUTION INTRAVENOUS CONTINUOUS
Status: DISCONTINUED | OUTPATIENT
Start: 2019-07-10 | End: 2019-07-10 | Stop reason: HOSPADM

## 2019-07-10 RX ORDER — SODIUM CHLORIDE, SODIUM LACTATE, POTASSIUM CHLORIDE, CALCIUM CHLORIDE 600; 310; 30; 20 MG/100ML; MG/100ML; MG/100ML; MG/100ML
100 INJECTION, SOLUTION INTRAVENOUS CONTINUOUS
Status: DISCONTINUED | OUTPATIENT
Start: 2019-07-10 | End: 2019-07-10 | Stop reason: HOSPADM

## 2019-07-10 RX ORDER — OXYCODONE HYDROCHLORIDE AND ACETAMINOPHEN 5; 325 MG/1; MG/1
1 TABLET ORAL ONCE AS NEEDED
Status: COMPLETED | OUTPATIENT
Start: 2019-07-10 | End: 2019-07-10

## 2019-07-10 RX ORDER — PROPOFOL 10 MG/ML
INJECTION, EMULSION INTRAVENOUS CONTINUOUS PRN
Status: DISCONTINUED | OUTPATIENT
Start: 2019-07-10 | End: 2019-07-11 | Stop reason: SURG

## 2019-07-10 RX ORDER — PROPOFOL 10 MG/ML
INJECTION, EMULSION INTRAVENOUS AS NEEDED
Status: DISCONTINUED | OUTPATIENT
Start: 2019-07-10 | End: 2019-07-11 | Stop reason: SURG

## 2019-07-10 RX ORDER — KETOROLAC TROMETHAMINE 30 MG/ML
INJECTION, SOLUTION INTRAMUSCULAR; INTRAVENOUS AS NEEDED
Status: DISCONTINUED | OUTPATIENT
Start: 2019-07-10 | End: 2019-07-11 | Stop reason: SURG

## 2019-07-10 RX ORDER — DIPHENHYDRAMINE HYDROCHLORIDE 50 MG/ML
12.5 INJECTION INTRAMUSCULAR; INTRAVENOUS ONCE AS NEEDED
Status: DISCONTINUED | OUTPATIENT
Start: 2019-07-10 | End: 2019-07-10 | Stop reason: HOSPADM

## 2019-07-10 RX ORDER — OXYCODONE HYDROCHLORIDE 5 MG/1
5 TABLET ORAL EVERY 4 HOURS PRN
Status: DISCONTINUED | OUTPATIENT
Start: 2019-07-10 | End: 2019-07-10 | Stop reason: HOSPADM

## 2019-07-10 RX ORDER — ONDANSETRON 2 MG/ML
4 INJECTION INTRAMUSCULAR; INTRAVENOUS ONCE AS NEEDED
Status: DISCONTINUED | OUTPATIENT
Start: 2019-07-10 | End: 2019-07-10 | Stop reason: HOSPADM

## 2019-07-10 RX ORDER — CEFAZOLIN SODIUM 2 G/50ML
2000 SOLUTION INTRAVENOUS ONCE
Status: DISCONTINUED | OUTPATIENT
Start: 2019-07-10 | End: 2019-07-10 | Stop reason: HOSPADM

## 2019-07-10 RX ADMIN — MIDAZOLAM HYDROCHLORIDE 2 MG: 1 INJECTION, SOLUTION INTRAMUSCULAR; INTRAVENOUS at 16:49

## 2019-07-10 RX ADMIN — DEXAMETHASONE SODIUM PHOSPHATE 4 MG: 4 INJECTION, SOLUTION INTRA-ARTICULAR; INTRALESIONAL; INTRAMUSCULAR; INTRAVENOUS; SOFT TISSUE at 16:57

## 2019-07-10 RX ADMIN — ONDANSETRON 4 MG: 2 INJECTION INTRAMUSCULAR; INTRAVENOUS at 16:57

## 2019-07-10 RX ADMIN — OXYCODONE HYDROCHLORIDE AND ACETAMINOPHEN 1 TABLET: 5; 325 TABLET ORAL at 17:58

## 2019-07-10 RX ADMIN — HYDROMORPHONE HYDROCHLORIDE 0.5 MG: 1 INJECTION, SOLUTION INTRAMUSCULAR; INTRAVENOUS; SUBCUTANEOUS at 17:52

## 2019-07-10 RX ADMIN — PROPOFOL 170 MG: 10 INJECTION, EMULSION INTRAVENOUS at 16:52

## 2019-07-10 RX ADMIN — HYDROMORPHONE HYDROCHLORIDE 0.5 MG: 1 INJECTION, SOLUTION INTRAMUSCULAR; INTRAVENOUS; SUBCUTANEOUS at 17:44

## 2019-07-10 RX ADMIN — FAMOTIDINE 20 MG: 10 INJECTION, SOLUTION INTRAVENOUS at 16:23

## 2019-07-10 RX ADMIN — ONDANSETRON 4 MG: 2 INJECTION INTRAMUSCULAR; INTRAVENOUS at 14:21

## 2019-07-10 RX ADMIN — SODIUM CHLORIDE, SODIUM LACTATE, POTASSIUM CHLORIDE, AND CALCIUM CHLORIDE: .6; .31; .03; .02 INJECTION, SOLUTION INTRAVENOUS at 16:47

## 2019-07-10 RX ADMIN — PROPOFOL 150 MCG/KG/MIN: 10 INJECTION, EMULSION INTRAVENOUS at 16:57

## 2019-07-10 RX ADMIN — ACETAMINOPHEN 975 MG: 325 TABLET, FILM COATED ORAL at 13:27

## 2019-07-10 RX ADMIN — DOXYCYCLINE HYCLATE 100 MG: 100 CAPSULE ORAL at 13:27

## 2019-07-10 RX ADMIN — KETOROLAC TROMETHAMINE 30 MG: 30 INJECTION, SOLUTION INTRAMUSCULAR at 17:09

## 2019-07-10 NOTE — OP NOTE
OPERATIVE REPORT  PATIENT NAME: Halina Muñoz    :  1990  MRN: [id number]  Pt Location: [facility] OR ROOM 03    SURGERY DATE: 7/10/2019    Surgeon(s) and Role:     * Sangeeta Ramirez MD - Primary    Preop Diagnosis:  Missed  [O02 1]    Post-Op Diagnosis Codes:     * Missed  [O02 1]    Procedure(s) (LRB):  DILATATION AND EVACUATION (D&E) (8 WEEKS) (N/A)    Specimen(s):  ID Type Source Tests Collected by Time Destination   1 : products of conception Tissue Products of Conception TISSUE EXAM Sangeeta Ramirez MD 7/10/2019 1657        Estimated Blood Loss:   Minimal    Drains:  * No LDAs found *    Anesthesia Type:   General    Operative Indications:  Missed  [O02 1]      Operative Findings:  Grossly normal exam under anesthesia with uterus consistent with approximately 10 weeks gestational, and palpable ovarian cyst   Complications:   None    Procedure and Technique:  Patient was taken the operating room suite and identified by name and wrist band is Halina Muñoz  She was given general anesthesia  She was placed in a modified dorsolithotomy position  The perineal area was prepped with Betadine solution  The patient was then sterilely draped  A time-out was performed with everyone in agreement  An exam under anesthesia was performed  A weighted speculum was placed in the past area vaginal vault and the anterior lip of the cervix was grasped with an Allis clamp and kept on gentle traction  The uterus was sounded to 13 cm  The cervix was easily dilated to 10 mm with Ulices dilators  And a 10 mm suction curette was then used to evacuate the uterus in classical fashion  The completeness of evacuation was undertaken with a very gentle curettage without any return of tissue  Uterus is massaged felt to be firm  400 mg of Cytotec was placed into the rectum for continued uterine contraction  The patient was then cleaned woken and brought to her room stable condition     I was present for the entire procedure    Patient Disposition:  PACU     SIGNATURE: Faizan Wooten MD  DATE: July 10, 2019  TIME: 5:30 PM

## 2019-07-10 NOTE — ANESTHESIA POSTPROCEDURE EVALUATION
Post-Op Assessment Note    CV Status:  Stable    Pain management: satisfactory to patient     Mental Status:  Alert and awake   Hydration Status:  Euvolemic   PONV Controlled:  Controlled   Airway Patency:  Patent   Post Op Vitals Reviewed: Yes      Staff: Anesthesiologist           /66 (07/10/19 1721)    Temp (!) 97 3 °F (36 3 °C) (07/10/19 1721)    Pulse 77 (07/10/19 1721)   Resp 16 (07/10/19 1721)    SpO2 98 % (07/10/19 1721) Patient

## 2019-07-10 NOTE — INTERVAL H&P NOTE
H&P reviewed  After examining the patient I find no changes in the patients condition since the H&P had been written    /56   Pulse 91   Temp 98 6 °F (37 °C) (Tympanic)   Resp 18   LMP 05/01/2019 (Exact Date)   SpO2 100%

## 2019-07-10 NOTE — ANESTHESIA PREPROCEDURE EVALUATION
Review of Systems/Medical History  Patient summary reviewed  Chart reviewed  No history of anesthetic complications     Cardiovascular  Exercise tolerance (METS): >4,     Pulmonary  Smoker cigarette smoker  , Tobacco cessation counseling given Cumulative Pack Years: 9,        GI/Hepatic            Endo/Other    Obesity  morbid obesity   GYN  Currently pregnant ,          Hematology  Anemia ,     Musculoskeletal       Neurology   Psychology           Physical Exam    Airway    Mallampati score: I  TM Distance: >3 FB  Neck ROM: full     Dental       Cardiovascular  Rhythm: regular, Rate: normal,     Pulmonary  Breath sounds clear to auscultation,     Other Findings        Anesthesia Plan  ASA Score- 2     Anesthesia Type- IV sedation with anesthesia with ASA Monitors  Additional Monitors:   Airway Plan:         Plan Factors-    Induction- intravenous  Postoperative Plan-     Informed Consent- Anesthetic plan and risks discussed with patient  I personally reviewed this patient with the CRNA  Discussed and agreed on the Anesthesia Plan with the CRNA  Susan Lira

## 2019-07-10 NOTE — DISCHARGE INSTRUCTIONS
Dilation and Curettage   WHAT YOU NEED TO KNOW:   Dilation and curettage (D&C) is a procedure to remove tissue from the lining of your uterus  DISCHARGE INSTRUCTIONS:   Call 911 for any of the following:   · You have signs of an allergic reaction, such as hives, trouble breathing, or severe swelling  Seek care immediately if:   · You have heavy vaginal bleeding that soaks 1 pad in 1 hour for 2 hours in a row  · You have a fever higher than 100 4°F (38°C)  · You have abdominal cramps for more than 2 days  · Your pain does not get better, even after treatment  Contact your healthcare provider if:   · You have foul-smelling vaginal discharge  · You do not get your monthly period  · You feel depressed or anxious  · You feel very tired and weak  · You have questions or concerns about your condition or care  Medicines: You may need any of the following:  · Prescription pain medicine  may be given  Do not wait until the pain is severe before you take your medicine  Ask your healthcare provider how to take this medicine safely  · Antibiotics  help fight or prevent a bacterial infection  · Take your medicine as directed  Contact your healthcare provider if you think your medicine is not helping or if you have side effects  Tell him or her if you are allergic to any medicine  Keep a list of the medicines, vitamins, and herbs you take  Include the amounts, and when and why you take them  Bring the list or the pill bottles to follow-up visits  Carry your medicine list with you in case of an emergency  Self-care:   · Use sanitary pads if needed  You may have light bleeding for up to 2 weeks  Do not use tampons  Use sanitary pads instead  This will help prevent a vaginal infection  · Rest as needed  Slowly start to do more each day  Return to your daily activities as directed  · Do not have sex for at least 2 weeks after the procedure  This will help prevent an infection      · Use birth control right after your procedure  Your monthly period should start again in 4 to 8 weeks  During this time, you could still ovulate (release an egg)  Use birth control as directed to prevent pregnancy during this time  Follow up with your healthcare provider as directed:  Write down your questions so you remember to ask them during your visits  © 2017 [employer] Information is for End User's use only and may not be sold, redistributed or otherwise used for commercial purposes  All illustrations and images included in CareNotes® are the copyrighted property of [employer] , [employer]  or [employer]  The above information is an  only  It is not intended as medical advice for individual conditions or treatments  Talk to your doctor, nurse or pharmacist before following any medical regimen to see if it is safe and effective for you

## 2019-07-11 ENCOUNTER — TELEPHONE (OUTPATIENT)
Dept: OBGYN CLINIC | Facility: CLINIC | Age: 29
End: 2019-07-11

## 2019-07-11 NOTE — TELEPHONE ENCOUNTER
Call and check on patient  Can either have a one week follow up with her normal OB or can see us in [address]  [facility] office in 1 week

## 2019-07-17 ENCOUNTER — TELEPHONE (OUTPATIENT)
Dept: OBGYN CLINIC | Facility: CLINIC | Age: 29
End: 2019-07-17

## 2019-07-28 ENCOUNTER — APPOINTMENT (EMERGENCY)
Dept: RADIOLOGY | Facility: HOSPITAL | Age: 29
End: 2019-07-28
Payer: COMMERCIAL

## 2019-07-28 ENCOUNTER — HOSPITAL ENCOUNTER (EMERGENCY)
Facility: HOSPITAL | Age: 29
Discharge: LEFT BEFORE DISCHARGE | End: 2019-07-28
Attending: EMERGENCY MEDICINE | Admitting: EMERGENCY MEDICINE
Payer: COMMERCIAL

## 2019-07-28 VITALS
TEMPERATURE: 97.8 F | DIASTOLIC BLOOD PRESSURE: 68 MMHG | WEIGHT: 181.88 LBS | RESPIRATION RATE: 16 BRPM | BODY MASS INDEX: 33.27 KG/M2 | OXYGEN SATURATION: 99 % | HEART RATE: 88 BPM | SYSTOLIC BLOOD PRESSURE: 133 MMHG

## 2019-07-28 DIAGNOSIS — M54.9 BACK PAIN: Primary | ICD-10-CM

## 2019-07-28 PROCEDURE — 99283 EMERGENCY DEPT VISIT LOW MDM: CPT

## 2019-07-28 PROCEDURE — 72100 X-RAY EXAM L-S SPINE 2/3 VWS: CPT

## 2019-07-28 PROCEDURE — 96372 THER/PROPH/DIAG INJ SC/IM: CPT

## 2019-07-28 RX ORDER — KETOROLAC TROMETHAMINE 30 MG/ML
15 INJECTION, SOLUTION INTRAMUSCULAR; INTRAVENOUS ONCE
Status: COMPLETED | OUTPATIENT
Start: 2019-07-28 | End: 2019-07-28

## 2019-07-28 RX ADMIN — KETOROLAC TROMETHAMINE 15 MG: 30 INJECTION, SOLUTION INTRAMUSCULAR at 17:59

## 2019-07-28 NOTE — ED PROVIDER NOTES
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 Taking? Prenatal Vit-Fe Fumarate-FA (PRENATAL VITAMIN) 28-0 8 mg  Self Yes No   Sig: Take by mouth      Facility-Administered Medications: None       Past Medical History:   Diagnosis Date    Anemia     Bipolar 1 disorder ([facility] [address]     Obesity     Psychiatric disorder     bipolar    Varicella     Child Hx       Past Surgical History:   Procedure Laterality Date     SECTION      ME  DELIVERY ONLY Bilateral 10/21/2017    Procedure:  SECTION (); Surgeon: Govind Darling MD;  [facility];  Service: Obstetrics    ME LAP,DIAGNOSTIC ABDOMEN N/A 2016    Procedure: EXPLORATORY LAPAROTOMY, LEFT SALPINGECTOMY;  Surgeon: Eloy Centeno MD;  Location:  MAIN OR;  Service: Gynecology    ME SURG RX MISSED ABORTN,1ST TRI N/A 7/10/2019    Procedure: DILATATION AND EVACUATION (D&E) (8 WEEKS); Surgeon: Antonio Marion MD;  Location: [facility] MAIN OR;  Service: Gynecology       Family History   Problem Relation Age of Onset   Heath Diaz HIV Mother     No Known Problems Father     Breast cancer Maternal Grandmother     Hypertension Maternal Grandmother     Diabetes Paternal Grandmother      I have reviewed and agree with the history as documented      Social History     Tobacco Use  Smoking status: Current Every Day Smoker     Packs/day: 0 25    Smokeless tobacco: Never Used   Substance Use Topics    Alcohol use: Yes     Frequency: 2-4 times a month    Drug use: No        Review of Systems   Constitutional: Negative  Negative for diaphoresis and fever  HENT: Negative  Respiratory: Negative  Negative for cough, shortness of breath and wheezing  Cardiovascular: Negative  Negative for chest pain, palpitations and leg swelling  Gastrointestinal: Negative for abdominal distention, abdominal pain, nausea and vomiting  Genitourinary: Negative  Musculoskeletal: Positive for back pain  Skin: Negative  Neurological: Negative  Psychiatric/Behavioral: Negative  All other systems reviewed and are negative  Physical Exam  Physical Exam   Constitutional: She is oriented to person, place, and time  She appears well-developed and well-nourished  HENT:   Head: Normocephalic and atraumatic  Eyes: Pupils are equal, round, and reactive to light  EOM are normal    Neck: Normal range of motion  Neck supple  Cardiovascular: Normal rate, regular rhythm and normal heart sounds  No murmur heard  Pulmonary/Chest: Effort normal and breath sounds normal    Abdominal: Soft  Bowel sounds are normal  She exhibits no distension  There is no tenderness  There is no rebound and no guarding  Musculoskeletal: Normal range of motion  Lumbar pain, no rashes or obvious deformity    Neurological: She is alert and oriented to person, place, and time  Skin: Skin is warm and dry  Psychiatric: She has a normal mood and affect  Vitals reviewed        Vital Signs  ED Triage Vitals [07/28/19 1735]   Temperature Pulse Respirations Blood Pressure SpO2   97 8 °F (36 6 °C) 88 16 133/68 99 %      Temp Source Heart Rate Source Patient Position - Orthostatic VS BP Location FiO2 (%)   Tympanic Monitor Sitting Right arm --      Pain Score       6           Vitals:    07/28/19 1735   BP: 133/68 Pulse: 88   Patient Position - Orthostatic VS: Sitting         Visual Acuity      ED Medications  Medications   ketorolac (TORADOL) injection 15 mg (15 mg Intramuscular Given 7/28/19 2703)       Diagnostic Studies  Results Reviewed     None                 XR spine lumbar 2 or 3 views injury   Final Result by Flor Santillan MD (07/29 0636)      Normal examination  Workstation performed: [id number]                    Procedures  Procedures       ED Course                               MDM    Disposition  Final diagnoses:   Back pain     Time reflects when diagnosis was documented in both MDM as applicable and the Disposition within this note     Time User Action Codes Description Comment    7/28/2019  7:02 PM Gayle Braden Add [M54 9] Back pain       ED Disposition     ED Disposition Condition Date/Time Comment    Left from Room after Provider Exam  Sun Jul 28, 2019  7:02 PM       Follow-up Information    None         Discharge Medication List as of 7/28/2019  7:07 PM      CONTINUE these medications which have NOT CHANGED    Details   Prenatal Vit-Fe Fumarate-FA (PRENATAL VITAMIN) 28-0 8 mg Take by mouth, Historical Med           No discharge procedures on file      ED Provider  Electronically Signed by           Bethany Coppola MD  07/29/19 0637

## 2019-08-07 ENCOUNTER — HOSPITAL ENCOUNTER (OUTPATIENT)
Facility: HOSPITAL | Age: 29
Setting detail: OBSERVATION
Discharge: HOME | End: 2019-08-08
Attending: EMERGENCY MEDICINE | Admitting: FAMILY MEDICINE
Payer: COMMERCIAL

## 2019-08-07 DIAGNOSIS — T39.1X1A ACCIDENTAL ACETAMINOPHEN OVERDOSE, INITIAL ENCOUNTER: Primary | ICD-10-CM

## 2019-08-07 PROBLEM — R30.0 DYSURIA: Status: ACTIVE | Noted: 2019-08-07

## 2019-08-07 PROBLEM — N39.0 UTI (URINARY TRACT INFECTION): Status: ACTIVE | Noted: 2019-08-07

## 2019-08-07 LAB
ALBUMIN SERPL BCP-MCNC: 3.8 G/DL (ref 3.5–5)
ALP SERPL-CCNC: 76 U/L (ref 46–116)
ALT SERPL W P-5'-P-CCNC: 20 U/L (ref 12–78)
AMPHETAMINES SERPL QL SCN: NEGATIVE
ANION GAP SERPL CALCULATED.3IONS-SCNC: 10 MMOL/L (ref 4–13)
APAP SERPL-MCNC: 39.4 UG/ML (ref 10–20)
APTT PPP: 29 SECONDS (ref 24–33)
AST SERPL W P-5'-P-CCNC: 23 U/L (ref 5–45)
BARBITURATES UR QL: NEGATIVE
BASOPHILS # BLD AUTO: 0.03 THOUSANDS/ΜL (ref 0–0.1)
BASOPHILS NFR BLD AUTO: 0 % (ref 0–1)
BENZODIAZ UR QL: NEGATIVE
BILIRUB SERPL-MCNC: 0.4 MG/DL (ref 0.2–1)
BILIRUB UR QL STRIP: NEGATIVE
BUN SERPL-MCNC: 9 MG/DL (ref 5–25)
CALCIUM SERPL-MCNC: 8.7 MG/DL (ref 8.3–10.1)
CHLORIDE SERPL-SCNC: 104 MMOL/L (ref 100–108)
CLARITY UR: CLEAR
CO2 SERPL-SCNC: 26 MMOL/L (ref 21–32)
COCAINE UR QL: NEGATIVE
COLOR UR: YELLOW
CREAT SERPL-MCNC: 0.77 MG/DL (ref 0.6–1.3)
EOSINOPHIL # BLD AUTO: 0.16 THOUSAND/ΜL (ref 0–0.61)
EOSINOPHIL NFR BLD AUTO: 2 % (ref 0–6)
ERYTHROCYTE [DISTWIDTH] IN BLOOD BY AUTOMATED COUNT: 18.8 % (ref 11.6–15.1)
EXT PREG TEST URINE: NEGATIVE
EXT. CONTROL ED NAV: NORMAL
GFR SERPL CREATININE-BSD FRML MDRD: 122 ML/MIN/1.73SQ M
GLUCOSE SERPL-MCNC: 85 MG/DL (ref 65–140)
GLUCOSE UR STRIP-MCNC: ABNORMAL MG/DL
HCT VFR BLD AUTO: 35.7 % (ref 34.8–46.1)
HGB BLD-MCNC: 10.4 G/DL (ref 11.5–15.4)
HGB UR QL STRIP.AUTO: NEGATIVE
IMM GRANULOCYTES # BLD AUTO: 0.03 THOUSAND/UL (ref 0–0.2)
IMM GRANULOCYTES NFR BLD AUTO: 0 % (ref 0–2)
INR PPP: 0.96 (ref 0.86–1.16)
KETONES UR STRIP-MCNC: ABNORMAL MG/DL
LEUKOCYTE ESTERASE UR QL STRIP: NEGATIVE
LYMPHOCYTES # BLD AUTO: 2.76 THOUSANDS/ΜL (ref 0.6–4.47)
LYMPHOCYTES NFR BLD AUTO: 39 % (ref 14–44)
MCH RBC QN AUTO: 24.7 PG (ref 26.8–34.3)
MCHC RBC AUTO-ENTMCNC: 29.1 G/DL (ref 31.4–37.4)
MCV RBC AUTO: 85 FL (ref 82–98)
METHADONE UR QL: NEGATIVE
MONOCYTES # BLD AUTO: 0.6 THOUSAND/ΜL (ref 0.17–1.22)
MONOCYTES NFR BLD AUTO: 8 % (ref 4–12)
NEUTROPHILS # BLD AUTO: 3.54 THOUSANDS/ΜL (ref 1.85–7.62)
NEUTS SEG NFR BLD AUTO: 51 % (ref 43–75)
NITRITE UR QL STRIP: NEGATIVE
NRBC BLD AUTO-RTO: 0 /100 WBCS
OPIATES UR QL SCN: NEGATIVE
PCP UR QL: NEGATIVE
PH UR STRIP.AUTO: 6 [PH]
PLATELET # BLD AUTO: 336 THOUSANDS/UL (ref 149–390)
PLATELET # BLD AUTO: 361 THOUSANDS/UL (ref 149–390)
PMV BLD AUTO: 10.1 FL (ref 8.9–12.7)
PMV BLD AUTO: 9.6 FL (ref 8.9–12.7)
POTASSIUM SERPL-SCNC: 3.8 MMOL/L (ref 3.5–5.3)
PROT SERPL-MCNC: 7.6 G/DL (ref 6.4–8.2)
PROT UR STRIP-MCNC: NEGATIVE MG/DL
PROTHROMBIN TIME: 10.1 SECONDS (ref 9.4–11.7)
RBC # BLD AUTO: 4.21 MILLION/UL (ref 3.81–5.12)
SODIUM SERPL-SCNC: 140 MMOL/L (ref 136–145)
SP GR UR STRIP.AUTO: 1.02 (ref 1–1.03)
THC UR QL: NEGATIVE
UROBILINOGEN UR QL STRIP.AUTO: 0.2 E.U./DL
WBC # BLD AUTO: 7.12 THOUSAND/UL (ref 4.31–10.16)

## 2019-08-07 PROCEDURE — 87081 CULTURE SCREEN ONLY: CPT | Performed by: STUDENT IN AN ORGANIZED HEALTH CARE EDUCATION/TRAINING PROGRAM

## 2019-08-07 PROCEDURE — 85610 PROTHROMBIN TIME: CPT | Performed by: PHYSICIAN ASSISTANT

## 2019-08-07 PROCEDURE — 80307 DRUG TEST PRSMV CHEM ANLYZR: CPT | Performed by: PHYSICIAN ASSISTANT

## 2019-08-07 PROCEDURE — 87591 N.GONORRHOEAE DNA AMP PROB: CPT | Performed by: STUDENT IN AN ORGANIZED HEALTH CARE EDUCATION/TRAINING PROGRAM

## 2019-08-07 PROCEDURE — 80053 COMPREHEN METABOLIC PANEL: CPT | Performed by: PHYSICIAN ASSISTANT

## 2019-08-07 PROCEDURE — 85049 AUTOMATED PLATELET COUNT: CPT | Performed by: STUDENT IN AN ORGANIZED HEALTH CARE EDUCATION/TRAINING PROGRAM

## 2019-08-07 PROCEDURE — 99449 NTRPROF PH1/NTRNET/EHR 31/>: CPT | Performed by: EMERGENCY MEDICINE

## 2019-08-07 PROCEDURE — 85025 COMPLETE CBC W/AUTO DIFF WBC: CPT | Performed by: PHYSICIAN ASSISTANT

## 2019-08-07 PROCEDURE — 81025 URINE PREGNANCY TEST: CPT | Performed by: PHYSICIAN ASSISTANT

## 2019-08-07 PROCEDURE — 87491 CHLMYD TRACH DNA AMP PROBE: CPT | Performed by: STUDENT IN AN ORGANIZED HEALTH CARE EDUCATION/TRAINING PROGRAM

## 2019-08-07 PROCEDURE — 81003 URINALYSIS AUTO W/O SCOPE: CPT | Performed by: STUDENT IN AN ORGANIZED HEALTH CARE EDUCATION/TRAINING PROGRAM

## 2019-08-07 PROCEDURE — 96360 HYDRATION IV INFUSION INIT: CPT

## 2019-08-07 PROCEDURE — 80329 ANALGESICS NON-OPIOID 1 OR 2: CPT | Performed by: PHYSICIAN ASSISTANT

## 2019-08-07 PROCEDURE — NC001 PR NO CHARGE: Performed by: EMERGENCY MEDICINE

## 2019-08-07 PROCEDURE — 99285 EMERGENCY DEPT VISIT HI MDM: CPT

## 2019-08-07 PROCEDURE — 36415 COLL VENOUS BLD VENIPUNCTURE: CPT | Performed by: PHYSICIAN ASSISTANT

## 2019-08-07 PROCEDURE — 85730 THROMBOPLASTIN TIME PARTIAL: CPT | Performed by: PHYSICIAN ASSISTANT

## 2019-08-07 RX ADMIN — SODIUM CHLORIDE 1000 ML: 0.9 INJECTION, SOLUTION INTRAVENOUS at 13:44

## 2019-08-07 RX ADMIN — NICOTINE 1 PATCH: 7 PATCH TRANSDERMAL at 17:53

## 2019-08-07 RX ADMIN — ENOXAPARIN SODIUM 40 MG: 40 INJECTION SUBCUTANEOUS at 17:51

## 2019-08-07 RX ADMIN — ACETYLCYSTEINE 12380 MG: 200 INJECTION, SOLUTION INTRAVENOUS at 14:46

## 2019-08-07 NOTE — PLAN OF CARE
Problem: PAIN - ADULT  Goal: Verbalizes/displays adequate comfort level or baseline comfort level  Description  Interventions:  - Encourage patient to monitor pain and request assistance  - Assess pain using appropriate pain scale  - Administer analgesics based on type and severity of pain and evaluate response  - Implement non-pharmacological measures as appropriate and evaluate response  - Consider cultural and social influences on pain and pain management  - Notify physician/advanced practitioner if interventions unsuccessful or patient reports new pain  Outcome: Progressing     Problem: INFECTION - ADULT  Goal: Absence or prevention of progression during hospitalization  Description  INTERVENTIONS:  - Assess and monitor for signs and symptoms of infection  - Monitor lab/diagnostic results  - Monitor all insertion sites, i e  indwelling lines, tubes, and drains    - Administer medications as ordered  - Instruct and encourage patient and family to use good hand hygiene technique  - Identify and instruct in appropriate isolation precautions for identified infection/condition   Outcome: Progressing  Goal: Absence of fever/infection during neutropenic period  Description  INTERVENTIONS:  - Monitor WBC     Outcome: Progressing     Problem: SAFETY ADULT  Goal: Patient will remain free of falls  Description  INTERVENTIONS:  - Assess patient frequently for physical needs  -  Identify cognitive and physical deficits and behaviors that affect risk of falls    -  [place] fall precautions as indicated by assessment   - Educate patient/family on patient safety including physical limitations  - Instruct patient to call for assistance with activity based on assessment  - Modify environment to reduce risk of injury  - Consider OT/PT consult to assist with strengthening/mobility  Outcome: Progressing  Goal: Maintain or return to baseline ADL function  Description  INTERVENTIONS:  -  Assess patient's ability to carry out ADLs; assess patient's baseline for ADL function and identify physical deficits which impact ability to perform ADLs (bathing, care of mouth/teeth, toileting, grooming, dressing, etc )  - Assess/evaluate cause of self-care deficits   - Assess range of motion  - Assess patient's mobility; develop plan if impaired  - Assess patient's need for assistive devices and provide as appropriate  - Encourage maximum independence but intervene and supervise when necessary  ¯ Involve family in performance of ADLs  ¯ Assess for home care needs following discharge   ¯ Request OT consult to assist with ADL evaluation and planning for discharge  ¯ Provide patient education as appropriate  Outcome: Progressing  Goal: Maintain or return mobility status to optimal level  Description  INTERVENTIONS:  - Assess patient's baseline mobility status (ambulation, transfers, stairs, etc )    - Identify cognitive and physical deficits and behaviors that affect mobility    - [place] fall precautions as indicated by assessment  - Record patient progress and toleration of activity level on Mobility SBAR; progress patient to next Phase/Stage  - Instruct patient to call for assistance with activity based on assessment     Outcome: Progressing     Problem: DISCHARGE PLANNING  Goal: Discharge to home or other facility with appropriate resources  Description  INTERVENTIONS:  - Identify barriers to discharge w/patient and caregiver  - Arrange for needed discharge resources and transportation as appropriate  - Identify discharge learning needs (meds, wound care, etc )  - Arrange for interpretive services to assist at discharge as needed  - Refer to Case Management Department for coordinating discharge planning if the patient needs post-hospital services based on physician/advanced practitioner order or complex needs related to functional status, cognitive ability, or social support system  Outcome: Progressing     Problem: Knowledge Deficit  Goal: Patient/family/caregiver demonstrates understanding of disease process, treatment plan, medications, and discharge instructions  Description  Complete learning assessment and assess knowledge base    Interventions:  - Provide teaching at level of understanding  - Provide teaching via preferred learning methods  Outcome: Progressing

## 2019-08-07 NOTE — LETTER
[address] [facility] [address]  Dept: [phone]    August 8, 2019     Patient: Andrew Mendes   YOB: 1990   Date of Visit: 8/7/2019       To Whom it May Concern:    Andrew Mendes is under my professional care  She was seen in the hospital from 8/7/2019   to 08/08/19  She may return to school on August 12 without limitations    If you have any questions or concerns, please don't hesitate to call           Sincerely,          Alphonso Keyes, DO

## 2019-08-07 NOTE — ED PROVIDER NOTES
History  Chief Complaint   Patient presents with    Overdose - Accidental     states may have taken too muck otc back pain med called sound and body, took some at 9am and again at noon  feels out of it     71-year-old female presenting today stating that she believes she took too much Tylenol  Patient states that she is taking medication called "sound body" which is essentially Tylenol 500 mg  States that she took 3 of these tablets at 9:00 a m  and another 3 tablets at 12:00 p m  About an hour after doing so she started to feel very drowsy and "feeling out of it"  States that it is hard to describe how she is feeling  She is not taking any other medications  Denies illicit drug or alcohol use  States that she feels very tired  Denies nausea vomiting, abdominal pain, headache, changes in vision, weakness, numbness, paresthesias, chest pain, shortness of breath, suicidal or homicidal ideation  Prior to Admission Medications   Prescriptions Last Dose Informant Patient Reported? Taking? Prenatal Vit-Fe Fumarate-FA (PRENATAL VITAMIN) 28-0 8 mg Not Taking at Unknown time Self Yes No   Sig: Take by mouth      Facility-Administered Medications: None       Past Medical History:   Diagnosis Date    Anemia     Bipolar 1 disorder ([facility] [address] )     Obesity     Psychiatric disorder     bipolar    UTI (urinary tract infection) 2019    Varicella     Child Hx       Past Surgical History:   Procedure Laterality Date     SECTION      PA  DELIVERY ONLY Bilateral 10/21/2017    Procedure:  SECTION (); Surgeon: Fatemeh Garcia MD;  [facility]: [facility];  Service: Obstetrics    PA LAP,DIAGNOSTIC ABDOMEN N/A 2016    Procedure: EXPLORATORY LAPAROTOMY, LEFT SALPINGECTOMY;  Surgeon: Derrick Magaña MD;  [facility];  Service: Gynecology    PA SURG RX MISSED ABORTN,1ST TRI N/A 7/10/2019    Procedure: DILATATION AND EVACUATION (D&E) (8 WEEKS);   Surgeon: Jeremy Oscar MD; Location: [facility];  Service: Gynecology       Family History   Problem Relation Age of Onset   Russell Chaves HIV Mother     No Known Problems Father     Breast cancer Maternal Grandmother     Hypertension Maternal Grandmother     Diabetes Paternal Grandmother      I have reviewed and agree with the history as documented  Social History     Tobacco Use    Smoking status: Current Every Day Smoker     Packs/day: 0 25     Years: 15 00     Pack years: 3 75    Smokeless tobacco: Never Used    Tobacco comment: Pt is uninterested in quitting smoking  Substance Use Topics    Alcohol use: Not Currently     Frequency: Never     Binge frequency: Never    Drug use: No        Review of Systems   Constitutional: Negative  HENT: Negative  Eyes: Negative  Respiratory: Negative  Cardiovascular: Negative  Gastrointestinal: Negative  Genitourinary: Negative  Musculoskeletal: Negative  Skin: Negative  Psychiatric/Behavioral: Negative  Negative for agitation, behavioral problems, confusion, decreased concentration, dysphoric mood, hallucinations, self-injury, sleep disturbance and suicidal ideas  The patient is not nervous/anxious and is not hyperactive  All other systems reviewed and are negative  Physical Exam  Physical Exam   Constitutional: She is oriented to person, place, and time  She appears well-developed and well-nourished  HENT:   Head: Normocephalic and atraumatic  Right Ear: External ear normal    Left Ear: External ear normal    Nose: Nose normal    Mouth/Throat: Oropharynx is clear and moist    Eyes: Pupils are equal, round, and reactive to light  Conjunctivae and EOM are normal    Neck: Normal range of motion  Neck supple  Cardiovascular: Regular rhythm, normal heart sounds and intact distal pulses  Exam reveals no gallop and no friction rub  No murmur heard  Pulmonary/Chest: Effort normal and breath sounds normal  No stridor  No respiratory distress  She has no wheezes  She has no rales  She exhibits no tenderness  S PO2 is 100% indicating adequate oxygenation   Abdominal: Soft  Bowel sounds are normal  She exhibits no distension and no mass  There is no tenderness  There is no rebound and no guarding  No hernia  Neurological: She is alert and oriented to person, place, and time  Patient is A&O x3   Skin: Skin is warm and dry  Capillary refill takes less than 2 seconds  Nursing note and vitals reviewed        Vital Signs  ED Triage Vitals [08/07/19 1256]   Temperature Pulse Respirations Blood Pressure SpO2   (!) 97 4 °F (36 3 °C) 77 16 142/92 98 %      Temp Source Heart Rate Source Patient Position - Orthostatic VS BP Location FiO2 (%)   Tympanic Monitor Sitting Left arm --      Pain Score       No Pain           Vitals:    08/07/19 1256 08/07/19 1500   BP: 142/92 132/77   Pulse: 77 55   Patient Position - Orthostatic VS: Sitting          Visual Acuity  Visual Acuity      Most Recent Value   L Pupil Size (mm)  3   R Pupil Size (mm)  3          ED Medications  Medications   nicotine (NICODERM CQ) 7 mg/24hr TD 24 hr patch 1 patch (has no administration in time range)   enoxaparin (LOVENOX) subcutaneous injection 40 mg (has no administration in time range)   sodium chloride 0 9 % bolus 1,000 mL (0 mL Intravenous Stopped 8/7/19 1447)   acetylcysteine (ACETADOTE) 12,380 mg in dextrose 5 % 200 mL IVPB (12,380 mg Intravenous New Bag 8/7/19 1446)       Diagnostic Studies  Results Reviewed     Procedure Component Value Units Date/Time    Protime-INR [[id number]]  (Normal) Collected:  08/07/19 1357    Lab Status:  Final result Specimen:  Blood from Arm, Left Updated:  08/07/19 1413     Protime 10 1 seconds      INR 0 96    APTT [[id number]]  (Normal) Collected:  08/07/19 1357    Lab Status:  Final result Specimen:  Blood from Arm, Left Updated:  08/07/19 1413     PTT 29 seconds     Comprehensive metabolic panel [[id number]] Collected:  08/07/19 1319    Lab Status:  Final result Specimen: Blood from Arm, Left Updated:  08/07/19 1346     Sodium 140 mmol/L      Potassium 3 8 mmol/L      Chloride 104 mmol/L      CO2 26 mmol/L      ANION GAP 10 mmol/L      BUN 9 mg/dL      Creatinine 0 77 mg/dL      Glucose 85 mg/dL      Calcium 8 7 mg/dL      AST 23 U/L      ALT 20 U/L      Alkaline Phosphatase 76 U/L      Total Protein 7 6 g/dL      Albumin 3 8 g/dL      Total Bilirubin 0 40 mg/dL      eGFR 122 ml/min/1 73sq m     Narrative:       [place] guidelines for Chronic Kidney Disease (CKD):     Stage 1 with normal or high GFR (GFR > 90 mL/min/1 73 square meters)    Stage 2 Mild CKD (GFR = 60-89 mL/min/1 73 square meters)    Stage 3A Moderate CKD (GFR = 45-59 mL/min/1 73 square meters)    Stage 3B Moderate CKD (GFR = 30-44 mL/min/1 73 square meters)    Stage 4 Severe CKD (GFR = 15-29 mL/min/1 73 square meters)    Stage 5 End Stage CKD (GFR <15 mL/min/1 73 square meters)  Note: GFR calculation is accurate only with a steady state creatinine    Acetaminophen level-"If concentration is detectable, please discuss with medical  on call " [[id number]]  (Abnormal) Collected:  08/07/19 1319    Lab Status:  Final result Specimen:  Blood from Arm, Left Updated:  08/07/19 1346     Acetaminophen Level 39 4 ug/mL     Rapid drug screen, urine [[id number]]  (Normal) Collected:  08/07/19 1316    Lab Status:  Final result Specimen:  Urine, Clean Catch Updated:  08/07/19 1339     Amph/Meth UR Negative     Barbiturate Ur Negative     Benzodiazepine Urine Negative     Cocaine Urine Negative     Methadone Urine Negative     Opiate Urine Negative     PCP Ur Negative     THC Urine Negative    Narrative:       FOR MEDICAL PURPOSES ONLY  IF CONFIRMATION NEEDED PLEASE CONTACT THE LAB WITHIN 5 DAYS      Drug Screen Cutoff Levels:  AMPHETAMINE/METHAMPHETAMINES  1000 ng/mL  BARBITURATES     200 ng/mL  BENZODIAZEPINES     200 ng/mL  COCAINE      300 ng/mL  METHADONE      300 ng/mL  OPIATES      300 ng/mL  PHENCYCLIDINE     25 ng/mL  THC       50 ng/mL      CBC and differential [[id number]]  (Abnormal) Collected:  08/07/19 1319    Lab Status:  Final result Specimen:  Blood from Arm, Left Updated:  08/07/19 1326     WBC 7 12 Thousand/uL      RBC 4 21 Million/uL      Hemoglobin 10 4 g/dL      Hematocrit 35 7 %      MCV 85 fL      MCH 24 7 pg      MCHC 29 1 g/dL      RDW 18 8 %      MPV 10 1 fL      Platelets 364 Thousands/uL      nRBC 0 /100 WBCs      Neutrophils Relative 51 %      Immat GRANS % 0 %      Lymphocytes Relative 39 %      Monocytes Relative 8 %      Eosinophils Relative 2 %      Basophils Relative 0 %      Neutrophils Absolute 3 54 Thousands/µL      Immature Grans Absolute 0 03 Thousand/uL      Lymphocytes Absolute 2 76 Thousands/µL      Monocytes Absolute 0 60 Thousand/µL      Eosinophils Absolute 0 16 Thousand/µL      Basophils Absolute 0 03 Thousands/µL     POCT pregnancy, urine [[id number]]  (Normal) Resulted:  08/07/19 1322    Lab Status:  Final result Updated:  08/07/19 1322     EXT PREG TEST UR (Ref: Negative) negative     Control valid                 No orders to display              Procedures  Procedures       ED Course  ED Course as of Aug 07 1643   Wed Aug 07, 2019   1347 Reaching out to tox   ACETAMINOPHEN LEVEL(!): 39 4   0 Spoke with Dr Gwendolyn Bassett                                  MDM  Number of Diagnoses or Management Options  Accidental acetaminophen overdose, initial encounter:   Diagnosis management comments: Spoke with toxicology Dr Gwendolyn Bassett regarding patient's case  Patient is adamant that she only took 6 tablets of Tylenol 500 mg  I spoke with patient alone, states that she has been under stress at home however she does feel safe, she denies suicidal homicidal ideation  After taking with Toxicology, it is clear that patient's Tylenol level is much higher than what it should be after only taking 3 g of Tylenol    Due to this concern, will bring patient in for NAC administration and Tylenol overdose  Patient has normal liver enzyme levels  Patient is feeling somewhat better after IV fluids, states that she is feeling very tired  Patient verbalizes understanding and agrees with the above assessment plan  Amount and/or Complexity of Data Reviewed  Clinical lab tests: reviewed and ordered  Review and summarize past medical records: yes  Independent visualization of images, tracings, or specimens: yes        Disposition  Final diagnoses:   Accidental acetaminophen overdose, initial encounter     Time reflects when diagnosis was documented in both MDM as applicable and the Disposition within this note     Time User Action Codes Description Comment    8/7/2019  2:28 PM Kimberly Mathew 178 Accidental acetaminophen overdose, initial encounter       ED Disposition     ED Disposition Condition Date/Time Comment    Admit Stable Wed Aug 7, 2019  2:28 PM Case was discussed with Dr Zainab Saunders and the patient's admission status was agreed to be Admission Status: observation status to the service of Dr Zainab Saunders   Follow-up Information    None         Current Discharge Medication List      CONTINUE these medications which have NOT CHANGED    Details   Prenatal Vit-Fe Fumarate-FA (PRENATAL VITAMIN) 28-0 8 mg Take by mouth           No discharge procedures on file      ED Provider  Electronically Signed by           Jose Ward PA-C  08/07/19 5318

## 2019-08-07 NOTE — ASSESSMENT & PLAN NOTE
Patient complains of feeling hazy and disoriented  Patient notes she has been taking 1,000 mg q6hrs consistently for 3 weeks  Note: this is a different amount that she originally admitted to taking when she first arrived  Acetaminophen level was 39 4 on admission  AST/ALT on admission 23/20      · Per toxicology, NAC loading dose 150mg/kg x 1, 12 5mg/kg/hr x 4 hrs, 6 25mg/kg/hr x 16 hrs  · Repeat CMP and Acetaminophen level in the AM  ·  AST and ALT normal and acetaminophen level undetectable morning of 8/8

## 2019-08-07 NOTE — ED NOTES
Pt is awake and alert, follow command   at bedside  Pt asked RN to call her work at [facility]        Juan Stock RN  08/07/19 600 [facility] Lucinda Arroyo RN  08/07/19 6303

## 2019-08-07 NOTE — CONSULTS
INTERPROFESSIONAL (PHONE) Christy 1980 Toxicology  Elkin Perales 33 y o  female MRN: [id number]  Unit/Bed#: ED CT2 Encounter: [id number]      Reason for Consult / Principal Problem: repeat supratherapeutic acetaminophen use  Consults  08/07/19      ASSESSMENT:  33year-old female with repeat supratherapeutic acetaminophen ingestion, possible acute overdose    RECOMMENDATIONS:  Please continue supportive measures and observe overnight while treating with N-acetylcysteine therapy: loading dose 150mg/kg x 1, 12 5mg/kg/hr x 4 hrs, 6 25mg/kg/hr x 16 hrs  Only recommended repeat laboratory values are a CMP and APAP in the morning  If APAP undetectable with normal AST and ALT, may discontinue NAC treatment  Patient states she has been taking 1500mg doses and took only two doses today  An underestimated calculation based on her body weight and serum APAP concentration shows she took more  Additionally, she states she has been taking supratherapeutic doses since yesterday  So if RM nomogram applied, then she is well over the acute threshold for treatment at 23 hours  Please additionally screen for suicidal ideation and self-harm attempt  For further questions, please contact the medical  on call via [employer] or throughl the [employer]  Service or Patient [employer]  Please see additional teaching note below:    Hx and PE limited by the dynamics of a phone consultation  I have not personally interviewed or evaluated the patient, but only advised based on the information provided to me  Primary provider is responsible for all clinical decisions  Pertinent history, physical exam and clinical findings and course discussed: Elkin Perales is a 33y o  year old female who presents with report of feeling ill while taking excessive doses of acetaminophen throughout the last 24 hours   Per history she as taken less than 3 5 grams per day but her serum concentration reflects larger doses  AST and ALT normal and she denies GI symptoms and SI  She states she was trying to treat back pain  Review of systems and physical exam not performed by me  Historical Information   Past Medical History:   Diagnosis Date    Anemia     Bipolar 1 disorder ([address] )     Obesity     Psychiatric disorder     bipolar    Varicella     Child Hx     Past Surgical History:   Procedure Laterality Date     SECTION      DC  DELIVERY ONLY Bilateral 10/21/2017    Procedure:  SECTION (); Surgeon: Vianney Silvestre MD;  [facility];  Service: Obstetrics    DC LAP,DIAGNOSTIC ABDOMEN N/A 2016    Procedure: EXPLORATORY LAPAROTOMY, LEFT SALPINGECTOMY;  Surgeon: Liliana Cervantes MD;  Location:  MAIN OR;  Service: Gynecology    DC SURG RX MISSED ABORTN,1ST TRI N/A 7/10/2019    Procedure: DILATATION AND EVACUATION (D&E) (8 WEEKS); Surgeon: Srini Dotson MD;  Location: [facility];  Service: Gynecology     Social History   Social History     Substance and Sexual Activity   Alcohol Use Yes    Frequency: 2-4 times a month     Social History     Substance and Sexual Activity   Drug Use No     Social History     Tobacco Use   Smoking Status Current Every Day Smoker    Packs/day: 0 25   Smokeless Tobacco Never Used     Family History   Problem Relation Age of Onset   Garrett Alvarez HIV Mother     No Known Problems Father     Breast cancer Maternal Grandmother     Hypertension Maternal Grandmother     Diabetes Paternal Grandmother         Prior to Admission medications    Medication Sig Start Date End Date Taking?  Authorizing Provider   Prenatal Vit-Fe Fumarate-FA (PRENATAL VITAMIN) 28-0 8 mg Take by mouth    Historical Provider, MD       Current Facility-Administered Medications   Medication Dose Route Frequency    sodium chloride 0 9 % bolus 1,000 mL  1,000 mL Intravenous Once       No Known Allergies    Objective     No intake or output data in the 24 hours ending 19 1418    Invasive Devices:   Peripheral IV 08/07/19 Left Antecubital (Active)   Site Assessment Dry;Clean; Intact 8/7/2019  1:20 PM   Dressing Type Transparent 8/7/2019  1:20 PM   Line Status Blood return noted; Flushed 8/7/2019  1:20 PM   Dressing Status Clean; Intact;Dry 8/7/2019  1:20 PM       Vitals   Vitals:    08/07/19 1256   BP: 142/92   TempSrc: Tympanic   Pulse: 77   Resp: 16   Patient Position - Orthostatic VS: Sitting   Temp: (!) 97 4 °F (36 3 °C)           Lab Results: I have personally reviewed pertinent reports  Labs:    Results from last 7 days   Lab Units 08/07/19  1319   WBC Thousand/uL 7 12   HEMOGLOBIN g/dL 10 4*   HEMATOCRIT % 35 7   PLATELETS Thousands/uL 361   NEUTROS PCT % 51   LYMPHS PCT % 39   MONOS PCT % 8      Results from last 7 days   Lab Units 08/07/19  1319   SODIUM mmol/L 140   POTASSIUM mmol/L 3 8   CHLORIDE mmol/L 104   CO2 mmol/L 26   BUN mg/dL 9   CREATININE mg/dL 0 77   CALCIUM mg/dL 8 7   ALK PHOS U/L 76   ALT U/L 20   AST U/L 23      Results from last 7 days   Lab Units 08/07/19  1357   INR  0 96   PTT seconds 29         No results found for: TROPONINI      Results from last 7 days   Lab Units 08/07/19  1319   ACETAMINOPHEN LVL ug/mL 39 4*           Imaging Studies: I have personally reviewed pertinent reports  Counseling / Coordination of Care  Total time spent today 36 minutes  This was a phone consultation

## 2019-08-07 NOTE — ASSESSMENT & PLAN NOTE
Patient complains of dysuria and frequency  She denies being diagnosed with the UTI but never picked up antibiotics  · UA  · Will also screen for gonorrhea and Chlamydia as patient was recently diagnosed with chlamydia on July 9th

## 2019-08-07 NOTE — ED NOTES
Patient does not acknowledge that I am at the bedside  Patient continues to speak to  like I am not in the room        Christine Diaz RN  08/07/19 3867

## 2019-08-08 VITALS
OXYGEN SATURATION: 98 % | HEART RATE: 69 BPM | DIASTOLIC BLOOD PRESSURE: 60 MMHG | HEIGHT: 62 IN | BODY MASS INDEX: 33.27 KG/M2 | TEMPERATURE: 98.3 F | SYSTOLIC BLOOD PRESSURE: 123 MMHG | RESPIRATION RATE: 16 BRPM

## 2019-08-08 LAB
ALBUMIN SERPL BCP-MCNC: 2.9 G/DL (ref 3.5–5)
ALP SERPL-CCNC: 60 U/L (ref 46–116)
ALT SERPL W P-5'-P-CCNC: 20 U/L (ref 12–78)
ANION GAP SERPL CALCULATED.3IONS-SCNC: 7 MMOL/L (ref 4–13)
APAP SERPL-MCNC: <2 UG/ML (ref 10–20)
AST SERPL W P-5'-P-CCNC: 12 U/L (ref 5–45)
BASOPHILS # BLD AUTO: 0.05 THOUSANDS/ΜL (ref 0–0.1)
BASOPHILS NFR BLD AUTO: 1 % (ref 0–1)
BILIRUB SERPL-MCNC: 0.2 MG/DL (ref 0.2–1)
BUN SERPL-MCNC: 14 MG/DL (ref 5–25)
CALCIUM SERPL-MCNC: 8 MG/DL (ref 8.3–10.1)
CHLORIDE SERPL-SCNC: 107 MMOL/L (ref 100–108)
CO2 SERPL-SCNC: 26 MMOL/L (ref 21–32)
CREAT SERPL-MCNC: 1.03 MG/DL (ref 0.6–1.3)
EOSINOPHIL # BLD AUTO: 0.16 THOUSAND/ΜL (ref 0–0.61)
EOSINOPHIL NFR BLD AUTO: 3 % (ref 0–6)
ERYTHROCYTE [DISTWIDTH] IN BLOOD BY AUTOMATED COUNT: 18.6 % (ref 11.6–15.1)
GFR SERPL CREATININE-BSD FRML MDRD: 86 ML/MIN/1.73SQ M
GLUCOSE SERPL-MCNC: 98 MG/DL (ref 65–140)
HCT VFR BLD AUTO: 30.9 % (ref 34.8–46.1)
HGB BLD-MCNC: 9 G/DL (ref 11.5–15.4)
IMM GRANULOCYTES # BLD AUTO: 0.01 THOUSAND/UL (ref 0–0.2)
IMM GRANULOCYTES NFR BLD AUTO: 0 % (ref 0–2)
LYMPHOCYTES # BLD AUTO: 3.01 THOUSANDS/ΜL (ref 0.6–4.47)
LYMPHOCYTES NFR BLD AUTO: 47 % (ref 14–44)
MAGNESIUM SERPL-MCNC: 1.8 MG/DL (ref 1.6–2.6)
MCH RBC QN AUTO: 24.9 PG (ref 26.8–34.3)
MCHC RBC AUTO-ENTMCNC: 29.1 G/DL (ref 31.4–37.4)
MCV RBC AUTO: 86 FL (ref 82–98)
MONOCYTES # BLD AUTO: 0.54 THOUSAND/ΜL (ref 0.17–1.22)
MONOCYTES NFR BLD AUTO: 9 % (ref 4–12)
NEUTROPHILS # BLD AUTO: 2.5 THOUSANDS/ΜL (ref 1.85–7.62)
NEUTS SEG NFR BLD AUTO: 40 % (ref 43–75)
NRBC BLD AUTO-RTO: 0 /100 WBCS
PHOSPHATE SERPL-MCNC: 3.6 MG/DL (ref 2.7–4.5)
PLATELET # BLD AUTO: 281 THOUSANDS/UL (ref 149–390)
PMV BLD AUTO: 9.9 FL (ref 8.9–12.7)
POTASSIUM SERPL-SCNC: 3.7 MMOL/L (ref 3.5–5.3)
PROT SERPL-MCNC: 6 G/DL (ref 6.4–8.2)
RBC # BLD AUTO: 3.61 MILLION/UL (ref 3.81–5.12)
SODIUM SERPL-SCNC: 140 MMOL/L (ref 136–145)
WBC # BLD AUTO: 6.27 THOUSAND/UL (ref 4.31–10.16)

## 2019-08-08 PROCEDURE — 84100 ASSAY OF PHOSPHORUS: CPT | Performed by: STUDENT IN AN ORGANIZED HEALTH CARE EDUCATION/TRAINING PROGRAM

## 2019-08-08 PROCEDURE — 80329 ANALGESICS NON-OPIOID 1 OR 2: CPT | Performed by: STUDENT IN AN ORGANIZED HEALTH CARE EDUCATION/TRAINING PROGRAM

## 2019-08-08 PROCEDURE — 83735 ASSAY OF MAGNESIUM: CPT | Performed by: STUDENT IN AN ORGANIZED HEALTH CARE EDUCATION/TRAINING PROGRAM

## 2019-08-08 PROCEDURE — 85025 COMPLETE CBC W/AUTO DIFF WBC: CPT | Performed by: STUDENT IN AN ORGANIZED HEALTH CARE EDUCATION/TRAINING PROGRAM

## 2019-08-08 PROCEDURE — 80053 COMPREHEN METABOLIC PANEL: CPT | Performed by: STUDENT IN AN ORGANIZED HEALTH CARE EDUCATION/TRAINING PROGRAM

## 2019-08-08 PROCEDURE — 99219 PR INITIAL OBSERVATION CARE/DAY 50 MINUTES: CPT | Performed by: FAMILY MEDICINE

## 2019-08-08 PROCEDURE — NC001 PR NO CHARGE: Performed by: FAMILY MEDICINE

## 2019-08-08 RX ADMIN — ENOXAPARIN SODIUM 40 MG: 40 INJECTION SUBCUTANEOUS at 08:47

## 2019-08-08 NOTE — NURSING NOTE
IV removed and all belongings accounted for  After visit summary and work note reviewed and given to patient  Patient left unit on foot, per patient request at [phone]

## 2019-08-08 NOTE — DISCHARGE SUMMARY
[facility] Discharge Summary - Medical Ida Perez 33 y o  female MRN: [id number]  [address] [facility] 87 / Bed: [facility] 143-* Encounter: [id number]    BRIEF OVERVIEW    Admitting Provider: Khushbu Regalado MD  Discharge Provider: Khushbu Regalado MD    Discharge To: Home  Facility: None    Outpatient Follow-Up:    [facility]     Things to address at first follow up visit:    Discuss Tylenol Use    Labs and results pending at discharge:     [address] [place] [address] amplified DNA by PCR    Admission Date: 2019     Discharge Date: 19    Primary Discharge Diagnosis  Principal Problem:    Accidental acetaminophen overdose  Active Problems:    Dysuria  Resolved Problems:    * No resolved hospital problems  *    Secondary Discharge Diagnoses  None    Consulting Providers   None      DETAILS OF HOSPITAL STAY    HPI (as per H&P)  · Ida Perez is a 33 y o  female who presents with feeling "weird" and disoriented  She notes that this morning she woke up feeling like this  She notes that she has been taking 1,000mg an acute 6 hours for the past 3 weeks for back pain, migraines, and abdominal cramping secondary to D&C  Per chart review, patient had D&C done on 07/10/2018 for missed   Patient notes that this feeling began around 13:30 thirty today  This was also when she took the last dose of Tylenol  Patient notes that she was recently in the emergency department for back pain but eventually signed out AMA as nothing was being done  Patient notes that this overdose was accidental and she did not intend to harm herself  She does not have any suicidal or homicidal thoughts  Patient also admits to urinary frequency and dysuria  Per patient, she was diagnosed with the UTI approximately a week ago but the meds were never sent to her pharmacy  Per chart review, she was diagnosed with chlamydia on 2019 but there are no notes regarding UTI   Note: Patient admitted to taking a different amount of Tylenol when she 1st arrived to ED  Per ED summary, the acetaminophen level did not match the amount of Tylenol she supposedly took  ED: NAC therapy started, NS 1,000 mL  Lumbar spine xray normal     Hospital Course:    Admitted for accidental tylenol overdose  Started on NAC therapy in the ED  Tylenol on admission: 39 4  Tylenol level repeated on day of discharge found to be <2  Testing for GC/CT was conducted as patient had symptoms of dysuria  Lab result pending at discharge  Advised that when resulted, we will inform of her results and proceed accordingly  On day of discharge, patient is stable  Advised to avoid excessive Tylenol use  Advised to follow up with PCP within 1 week upon discharge  Would benefit from OMT therapy      Procedures Performed/Pertinent Test results    Results for orders placed or performed during the hospital encounter of 08/07/19   CBC and differential   Result Value Ref Range    WBC 7 12 4 31 - 10 16 Thousand/uL    RBC 4 21 3 81 - 5 12 Million/uL    Hemoglobin 10 4 (L) 11 5 - 15 4 g/dL    Hematocrit 35 7 34 8 - 46 1 %    MCV 85 82 - 98 fL    MCH 24 7 (L) 26 8 - 34 3 pg    MCHC 29 1 (L) 31 4 - 37 4 g/dL    RDW 18 8 (H) 11 6 - 15 1 %    MPV 10 1 8 9 - 12 7 fL    Platelets 943 048 - 513 Thousands/uL    nRBC 0 /100 WBCs    Neutrophils Relative 51 43 - 75 %    Immat GRANS % 0 0 - 2 %    Lymphocytes Relative 39 14 - 44 %    Monocytes Relative 8 4 - 12 %    Eosinophils Relative 2 0 - 6 %    Basophils Relative 0 0 - 1 %    Neutrophils Absolute 3 54 1 85 - 7 62 Thousands/µL    Immature Grans Absolute 0 03 0 00 - 0 20 Thousand/uL    Lymphocytes Absolute 2 76 0 60 - 4 47 Thousands/µL    Monocytes Absolute 0 60 0 17 - 1 22 Thousand/µL    Eosinophils Absolute 0 16 0 00 - 0 61 Thousand/µL    Basophils Absolute 0 03 0 00 - 0 10 Thousands/µL   Comprehensive metabolic panel   Result Value Ref Range    Sodium 140 136 - 145 mmol/L    Potassium 3 8 3 5 - 5 3 mmol/L    Chloride 104 100 - 108 mmol/L    CO2 26 21 - 32 mmol/L    ANION GAP 10 4 - 13 mmol/L    BUN 9 5 - 25 mg/dL    Creatinine 0 77 0 60 - 1 30 mg/dL    Glucose 85 65 - 140 mg/dL    Calcium 8 7 8 3 - 10 1 mg/dL    AST 23 5 - 45 U/L    ALT 20 12 - 78 U/L    Alkaline Phosphatase 76 46 - 116 U/L    Total Protein 7 6 6 4 - 8 2 g/dL    Albumin 3 8 3 5 - 5 0 g/dL    Total Bilirubin 0 40 0 20 - 1 00 mg/dL    eGFR 122 ml/min/1 73sq m   Acetaminophen level-"If concentration is detectable, please discuss with medical  on call "   Result Value Ref Range    Acetaminophen Level 39 4 (H) 10 - 20 ug/mL   Rapid drug screen, urine   Result Value Ref Range    Amph/Meth UR Negative Negative    Barbiturate Ur Negative Negative    Benzodiazepine Urine Negative Negative    Cocaine Urine Negative Negative    Methadone Urine Negative Negative    Opiate Urine Negative Negative    PCP Ur Negative Negative    THC Urine Negative Negative   Protime-INR   Result Value Ref Range    Protime 10 1 9 4 - 11 7 seconds    INR 0 96 0 86 - 1 16   APTT   Result Value Ref Range    PTT 29 24 - 33 seconds   Platelet count   Result Value Ref Range    Platelets 665 987 - 193 Thousands/uL    MPV 9 6 8 9 - 12 7 fL   UA w Reflex to Microscopic w Reflex to Culture   Result Value Ref Range    Color, UA Yellow     Clarity, UA Clear     Specific Gravity, UA 1 020 1 000 - 1 030    pH, UA 6 0 5 0, 5 5, 6 0, 6 5, 7 0, 7 5, 8 0, 8 5, 9 0    Leukocytes, UA Negative Negative    Nitrite, UA Negative Negative    Protein, UA Negative Negative mg/dl    Glucose,  (1/2%) (A) Negative mg/dl    Ketones, UA 15 (1+) (A) Negative mg/dl    Urobilinogen, UA 0 2 0 2, 1 0 E U /dl E U /dl    Bilirubin, UA Negative Negative    Blood, UA Negative Negative   Comprehensive metabolic panel   Result Value Ref Range    Sodium 140 136 - 145 mmol/L    Potassium 3 7 3 5 - 5 3 mmol/L    Chloride 107 100 - 108 mmol/L    CO2 26 21 - 32 mmol/L    ANION GAP 7 4 - 13 mmol/L    BUN 14 5 - 25 mg/dL    Creatinine 1 03 0 60 - 1 30 mg/dL    Glucose 98 65 - 140 mg/dL    Calcium 8 0 (L) 8 3 - 10 1 mg/dL    AST 12 5 - 45 U/L    ALT 20 12 - 78 U/L    Alkaline Phosphatase 60 46 - 116 U/L    Total Protein 6 0 (L) 6 4 - 8 2 g/dL    Albumin 2 9 (L) 3 5 - 5 0 g/dL    Total Bilirubin 0 20 0 20 - 1 00 mg/dL    eGFR 86 ml/min/1 73sq m   Magnesium   Result Value Ref Range    Magnesium 1 8 1 6 - 2 6 mg/dL   Phosphorus   Result Value Ref Range    Phosphorus 3 6 2 7 - 4 5 mg/dL   CBC and differential   Result Value Ref Range    WBC 6 27 4 31 - 10 16 Thousand/uL    RBC 3 61 (L) 3 81 - 5 12 Million/uL    Hemoglobin 9 0 (L) 11 5 - 15 4 g/dL    Hematocrit 30 9 (L) 34 8 - 46 1 %    MCV 86 82 - 98 fL    MCH 24 9 (L) 26 8 - 34 3 pg    MCHC 29 1 (L) 31 4 - 37 4 g/dL    RDW 18 6 (H) 11 6 - 15 1 %    MPV 9 9 8 9 - 12 7 fL    Platelets 648 652 - 846 Thousands/uL    nRBC 0 /100 WBCs    Neutrophils Relative 40 (L) 43 - 75 %    Immat GRANS % 0 0 - 2 %    Lymphocytes Relative 47 (H) 14 - 44 %    Monocytes Relative 9 4 - 12 %    Eosinophils Relative 3 0 - 6 %    Basophils Relative 1 0 - 1 %    Neutrophils Absolute 2 50 1 85 - 7 62 Thousands/µL    Immature Grans Absolute 0 01 0 00 - 0 20 Thousand/uL    Lymphocytes Absolute 3 01 0 60 - 4 47 Thousands/µL    Monocytes Absolute 0 54 0 17 - 1 22 Thousand/µL    Eosinophils Absolute 0 16 0 00 - 0 61 Thousand/µL    Basophils Absolute 0 05 0 00 - 0 10 Thousands/µL   Acetaminophen level-"If concentration is detectable, please discuss with medical  on call "   Result Value Ref Range    Acetaminophen Level <2 (L) 10 - 20 ug/mL   POCT pregnancy, urine   Result Value Ref Range    EXT PREG TEST UR (Ref: Negative) negative     Control valid        Medications:      Your Medications   Your Medications    Morning Afternoon Evening Bedtime As Needed   prenatal vitamin 28-0 8 mg   Take by mouth   Refills: 0               Allergies  No Known Allergies    Diet restrictions: none  Activity restrictions: none  Code Status: Level 1 - Full Code  Advance Directive and Living Will: <no information>  Power of :    POLST:      Discharge Condition: stable      Discharge Statement:   I spent 30 minutes discharging the patient  This time was spent on the day of discharge  I had direct contact with the patient on the day of discharge  Additional documentation is required if more than 30 minutes were spent on discharge         Norma Murillo MD

## 2019-08-08 NOTE — PLAN OF CARE
Problem: PAIN - ADULT  Goal: Verbalizes/displays adequate comfort level or baseline comfort level  Description  Interventions:  - Encourage patient to monitor pain and request assistance  - Assess pain using appropriate pain scale  - Administer analgesics based on type and severity of pain and evaluate response  - Implement non-pharmacological measures as appropriate and evaluate response  - Consider cultural and social influences on pain and pain management  - Notify physician/advanced practitioner if interventions unsuccessful or patient reports new pain  Outcome: Progressing     Problem: INFECTION - ADULT  Goal: Absence or prevention of progression during hospitalization  Description  INTERVENTIONS:  - Assess and monitor for signs and symptoms of infection  - Monitor lab/diagnostic results  - Monitor all insertion sites, i e  indwelling lines, tubes, and drains    - Administer medications as ordered  - Instruct and encourage patient and family to use good hand hygiene technique  - Identify and instruct in appropriate isolation precautions for identified infection/condition   Outcome: Progressing  Goal: Absence of fever/infection during neutropenic period  Description  INTERVENTIONS:  - Monitor WBC     Outcome: Progressing     Problem: SAFETY ADULT  Goal: Patient will remain free of falls  Description  INTERVENTIONS:  - Assess patient frequently for physical needs  -  Identify cognitive and physical deficits and behaviors that affect risk of falls    -  [place] fall precautions as indicated by assessment   - Educate patient/family on patient safety including physical limitations  - Instruct patient to call for assistance with activity based on assessment  - Modify environment to reduce risk of injury  - Consider OT/PT consult to assist with strengthening/mobility  Outcome: Progressing  Goal: Maintain or return to baseline ADL function  Description  INTERVENTIONS:  -  Assess patient's ability to carry out ADLs; assess patient's baseline for ADL function and identify physical deficits which impact ability to perform ADLs (bathing, care of mouth/teeth, toileting, grooming, dressing, etc )  - Assess/evaluate cause of self-care deficits   - Assess range of motion  - Assess patient's mobility; develop plan if impaired  - Assess patient's need for assistive devices and provide as appropriate  - Encourage maximum independence but intervene and supervise when necessary  ¯ Involve family in performance of ADLs  ¯ Assess for home care needs following discharge   ¯ Request OT consult to assist with ADL evaluation and planning for discharge  ¯ Provide patient education as appropriate  Outcome: Progressing  Goal: Maintain or return mobility status to optimal level  Description  INTERVENTIONS:  - Assess patient's baseline mobility status (ambulation, transfers, stairs, etc )    - Identify cognitive and physical deficits and behaviors that affect mobility    - [place] fall precautions as indicated by assessment  - Record patient progress and toleration of activity level on Mobility SBAR; progress patient to next Phase/Stage  - Instruct patient to call for assistance with activity based on assessment     Outcome: Progressing     Problem: DISCHARGE PLANNING  Goal: Discharge to home or other facility with appropriate resources  Description  INTERVENTIONS:  - Identify barriers to discharge w/patient and caregiver  - Arrange for needed discharge resources and transportation as appropriate  - Identify discharge learning needs (meds, wound care, etc )  - Arrange for interpretive services to assist at discharge as needed  - Refer to Case Management Department for coordinating discharge planning if the patient needs post-hospital services based on physician/advanced practitioner order or complex needs related to functional status, cognitive ability, or social support system  Outcome: Progressing     Problem: Knowledge Deficit  Goal: Patient/family/caregiver demonstrates understanding of disease process, treatment plan, medications, and discharge instructions  Description  Complete learning assessment and assess knowledge base    Interventions:  - Provide teaching at level of understanding  - Provide teaching via preferred learning methods  Outcome: Progressing

## 2019-08-08 NOTE — UTILIZATION REVIEW
Initial Clinical Review    Admission: Date/Time/Statement:   Admission Orders (From admission, onward)     Ordered        08/07/19 1429  Place in Observation  Once                   Orders Placed This Encounter   Procedures    Place in Observation     Standing Status:   Standing     Number of Occurrences:   1     Order Specific Question:   Admitting Physician     Answer:   Ladarius Hayward [[id number]]     Order Specific Question:   Level of Care     Answer:   Med Surg [[id number]]     ED Arrival Information     Expected Arrival Acuity Means of Arrival Escorted By Service Admission Type    - 8/7/2019 12:48 Urgent Walk-In Spouse General Medicine Urgent    Arrival Complaint    overmedication of pain meds        Chief Complaint   Patient presents with    Overdose - Accidental     states may have taken too muck otc back pain med called sound and body, took some at 9am and again at noon  feels out of it     Assessment/Plan:   31-year-old female presenting today stating that she believes she took too much Tylenol  Patient states that she is taking medication called "sound body" which is essentially Tylenol 500 mg  States that she took 3 of these tablets at 9:00 a m  and another 3 tablets at 12:00 p m  About an hour after doing so she started to feel very drowsy and "feeling out of it"  States that it is hard to describe how she is feeling  She is not taking any other medications  Denies illicit drug or alcohol use  States that she feels very tired  Denies nausea vomiting, abdominal pain, headache, changes in vision, weakness, numbness, paresthesias, chest pain, shortness of breath, suicidal or homicidal ideation  Dysuria  Assessment & Plan  Patient complains of dysuria and frequency  She denies being diagnosed with the UTI but never picked up antibiotics    · UA  · Will also screen for gonorrhea and Chlamydia as patient was recently diagnosed with chlamydia on July 9th      * Accidental acetaminophen overdose  Assessment & Plan  Patient complains of feeling hazy and disoriented  Patient notes she has been taking 1,000 mg q6hrs consistently for 3 weeks  Note: this is a different amount that she originally admitted to taking when she first arrived  Acetaminophen level was 39 4 on admission  AST/ALT on admission 23/20      · Per toxicology, NAC loading dose 150mg/kg x 1, 12 5mg/kg/hr x 4 hrs, 6 25mg/kg/hr x 16 hrs  · Repeat CMP and Acetaminophen level in the AM  · If AST and ALT normal and acetaminophen level undetectable, NAC can be d/syeda    ED Triage Vitals [08/07/19 1256]   Temperature Pulse Respirations Blood Pressure SpO2   (!) 97 4 °F (36 3 °C) 77 16 142/92 98 %      Temp Source Heart Rate Source Patient Position - Orthostatic VS BP Location FiO2 (%)   Tympanic Monitor Sitting Left arm --      Pain Score       No Pain        Wt Readings from Last 1 Encounters:   07/28/19 82 5 kg (181 lb 14 1 oz)     Additional Vital Signs:   08/07/19 2327  97 4 °F (36 3 °C)Abnormal   55  16  141/71  98 %  None (Room air)  Lying   08/07/19 1500  97 1 °F (36 2 °C)Abnormal   55  16  132/77  100 %  --  --   08/07/19 1256  97 4 °F (36 3 °C)Abnormal   77  16  142/92  98 %  None (Room air)  Sitting   Pertinent Labs/Diagnostic Test Results:   Results from last 7 days   Lab Units 08/08/19 0612 08/07/19 2022 08/07/19  1319   WBC Thousand/uL 6 27  --  7 12   HEMOGLOBIN g/dL 9 0*  --  10 4*   HEMATOCRIT % 30 9*  --  35 7   PLATELETS Thousands/uL 281 336 361   NEUTROS ABS Thousands/µL 2 50  --  3 54     Results from last 7 days   Lab Units 08/08/19  0612 08/07/19  1319   SODIUM mmol/L 140 140   POTASSIUM mmol/L 3 7 3 8   CHLORIDE mmol/L 107 104   CO2 mmol/L 26 26   ANION GAP mmol/L 7 10   BUN mg/dL 14 9   CREATININE mg/dL 1 03 0 77   EGFR ml/min/1 73sq m 86 122   CALCIUM mg/dL 8 0* 8 7   MAGNESIUM mg/dL 1 8  --    PHOSPHORUS mg/dL 3 6  --      Results from last 7 days   Lab Units 08/08/19  0612 08/07/19  1319   AST U/L 12 23   ALT U/L 20 20   ALK PHOS U/L 60 76   TOTAL PROTEIN g/dL 6 0* 7 6   ALBUMIN g/dL 2 9* 3 8   TOTAL BILIRUBIN mg/dL 0 20 0 40     Results from last 7 days   Lab Units 08/08/19  0612 08/07/19  1319   GLUCOSE RANDOM mg/dL 98 85     Results from last 7 days   Lab Units 08/07/19  1357   PROTIME seconds 10 1   INR  0 96   PTT seconds 29     Results from last 7 days   Lab Units 08/07/19  2018   CLARITY UA  Clear   COLOR UA  Yellow   SPEC GRAV UA  1 020   PH UA  6 0   GLUCOSE UA mg/dl 500 (1/2%)*   KETONES UA mg/dl 15 (1+)*   BLOOD UA  Negative   PROTEIN UA mg/dl Negative   NITRITE UA  Negative   BILIRUBIN UA  Negative   UROBILINOGEN UA E U /dl 0 2   LEUKOCYTES UA  Negative     Results from last 7 days   Lab Units 08/07/19  1316   AMPH/METH  Negative   BARBITURATE UR  Negative   BENZODIAZEPINE UR  Negative   COCAINE UR  Negative   METHADONE URINE  Negative   OPIATE UR  Negative   PCP UR  Negative   THC UR  Negative     Results from last 7 days   Lab Units 08/08/19  0612 08/07/19  1319   ACETAMINOPHEN LVL ug/mL <2* 39 4*     ED Treatment:   Medication Administration from 08/07/2019 1247 to 08/07/2019 1505       Date/Time Order Dose Route Action Action by Comments     08/07/2019 1447 sodium chloride 0 9 % bolus 1,000 mL 0 mL Intravenous Stopped Jeni Lopes RN      08/07/2019 1344 sodium chloride 0 9 % bolus 1,000 mL 1,000 mL Intravenous [address] Jennifer Senior RN      08/07/2019 1446 acetylcysteine (ACETADOTE) 12,380 mg in dextrose 5 % 200 mL IVPB 12,380 mg Intravenous New Bag Jeni Lopes RN         Past Medical History:   Diagnosis Date    Anemia     Bipolar 1 disorder ([address] )     Obesity     Psychiatric disorder     bipolar    UTI (urinary tract infection) 8/7/2019    Varicella     Child Hx     Present on Admission:   Accidental acetaminophen overdose  Admitting Diagnosis: Overdose of medication [T50 901A]  Accidental acetaminophen overdose, initial encounter [T39 1X1A]  Age/Sex: 33 y o  female  Admission Orders:  MED SURG  CONSULT TOXICOLOGY  VENODYNES  Current Facility-Administered Medications:  enoxaparin 40 mg Subcutaneous Daily   nicotine 1 patch Transdermal Daily     Network Utilization Review Department  Phone: [phone]; Fax [phone]  [email or web address]@"[email or web address]  ATTENTION: Please call with any questions or concerns to [phone]  and carefully listen to the prompts so that you are directed to the right person  Send all requests for admission clinical reviews, approved or denied determinations and any other requests to fax [phone]   All voicemails are confidential

## 2019-08-08 NOTE — PROGRESS NOTES
[facility] Practice Progress Note - Jennifer Marquez 33 y o  female MRN: [id number]    Unit/Bed#: [address] [place] [address] Encounter: [id number]      Assessment/Plan:  Dysuria  Assessment & Plan  Patient complains of dysuria and frequency  She denies being diagnosed with the UTI but never picked up antibiotics  · UA  · Will also screen for gonorrhea and Chlamydia as patient was recently diagnosed with chlamydia on July 9th  * Accidental acetaminophen overdose  Assessment & Plan  Patient complains of feeling hazy and disoriented  Patient notes she has been taking 1,000 mg q6hrs consistently for 3 weeks  Note: this is a different amount that she originally admitted to taking when she first arrived  Acetaminophen level was 39 4 on admission  AST/ALT on admission 23/20  · Per toxicology, NAC loading dose 150mg/kg x 1, 12 5mg/kg/hr x 4 hrs, 6 25mg/kg/hr x 16 hrs  · Repeat CMP and Acetaminophen level in the AM  ·  AST and ALT normal and acetaminophen level undetectable morning of 8/8        Subjective:   Patient was seen and examined at bedside  She complains of fatigue but no other complaints  She denies abdominal pain, n/v, chest pain, and SOB  Objective:     Vitals: Blood pressure 123/60, pulse 69, temperature 98 3 °F (36 8 °C), temperature source Oral, resp  rate 16, height 5' 2" (1 575 m), last menstrual period 05/01/2019, SpO2 98 %, not currently breastfeeding  ,Body mass index is 33 27 kg/m²    Wt Readings from Last 3 Encounters:   07/28/19 82 5 kg (181 lb 14 1 oz)   07/09/19 84 4 kg (186 lb)   07/08/19 85 9 kg (189 lb 6 4 oz)       Intake/Output Summary (Last 24 hours) at 8/8/2019 0755  Last data filed at 8/7/2019 2218  Gross per 24 hour   Intake 360 ml   Output 150 ml   Net 210 ml       Physical Exam:  General: NAD, sleeping  Heart: RRR with S1 S2   Lungs: CTA bilaterally   Abdomen: soft, non tender, nondistended   Psych: non suicidal, AxO 3    Recent Results (from the past 24 hour(s))   Rapid drug screen, urine    Collection Time: 08/07/19  1:16 PM   Result Value Ref Range    Amph/Meth UR Negative Negative    Barbiturate Ur Negative Negative    Benzodiazepine Urine Negative Negative    Cocaine Urine Negative Negative    Methadone Urine Negative Negative    Opiate Urine Negative Negative    PCP Ur Negative Negative    THC Urine Negative Negative   CBC and differential    Collection Time: 08/07/19  1:19 PM   Result Value Ref Range    WBC 7 12 4 31 - 10 16 Thousand/uL    RBC 4 21 3 81 - 5 12 Million/uL    Hemoglobin 10 4 (L) 11 5 - 15 4 g/dL    Hematocrit 35 7 34 8 - 46 1 %    MCV 85 82 - 98 fL    MCH 24 7 (L) 26 8 - 34 3 pg    MCHC 29 1 (L) 31 4 - 37 4 g/dL    RDW 18 8 (H) 11 6 - 15 1 %    MPV 10 1 8 9 - 12 7 fL    Platelets 176 302 - 643 Thousands/uL    nRBC 0 /100 WBCs    Neutrophils Relative 51 43 - 75 %    Immat GRANS % 0 0 - 2 %    Lymphocytes Relative 39 14 - 44 %    Monocytes Relative 8 4 - 12 %    Eosinophils Relative 2 0 - 6 %    Basophils Relative 0 0 - 1 %    Neutrophils Absolute 3 54 1 85 - 7 62 Thousands/µL    Immature Grans Absolute 0 03 0 00 - 0 20 Thousand/uL    Lymphocytes Absolute 2 76 0 60 - 4 47 Thousands/µL    Monocytes Absolute 0 60 0 17 - 1 22 Thousand/µL    Eosinophils Absolute 0 16 0 00 - 0 61 Thousand/µL    Basophils Absolute 0 03 0 00 - 0 10 Thousands/µL   Comprehensive metabolic panel    Collection Time: 08/07/19  1:19 PM   Result Value Ref Range    Sodium 140 136 - 145 mmol/L    Potassium 3 8 3 5 - 5 3 mmol/L    Chloride 104 100 - 108 mmol/L    CO2 26 21 - 32 mmol/L    ANION GAP 10 4 - 13 mmol/L    BUN 9 5 - 25 mg/dL    Creatinine 0 77 0 60 - 1 30 mg/dL    Glucose 85 65 - 140 mg/dL    Calcium 8 7 8 3 - 10 1 mg/dL    AST 23 5 - 45 U/L    ALT 20 12 - 78 U/L    Alkaline Phosphatase 76 46 - 116 U/L    Total Protein 7 6 6 4 - 8 2 g/dL    Albumin 3 8 3 5 - 5 0 g/dL    Total Bilirubin 0 40 0 20 - 1 00 mg/dL    eGFR 122 ml/min/1 73sq m   Acetaminophen level-"If concentration is detectable, please discuss with medical  on call "    Collection Time: 08/07/19  1:19 PM   Result Value Ref Range    Acetaminophen Level 39 4 (H) 10 - 20 ug/mL   POCT pregnancy, urine    Collection Time: 08/07/19  1:22 PM   Result Value Ref Range    EXT PREG TEST UR (Ref: Negative) negative     Control valid    Protime-INR    Collection Time: 08/07/19  1:57 PM   Result Value Ref Range    Protime 10 1 9 4 - 11 7 seconds    INR 0 96 0 86 - 1 16   APTT    Collection Time: 08/07/19  1:57 PM   Result Value Ref Range    PTT 29 24 - 33 seconds   UA w Reflex to Microscopic w Reflex to Culture    Collection Time: 08/07/19  8:18 PM   Result Value Ref Range    Color, UA Yellow     Clarity, UA Clear     Specific Gravity, UA 1 020 1 000 - 1 030    pH, UA 6 0 5 0, 5 5, 6 0, 6 5, 7 0, 7 5, 8 0, 8 5, 9 0    Leukocytes, UA Negative Negative    Nitrite, UA Negative Negative    Protein, UA Negative Negative mg/dl    Glucose,  (1/2%) (A) Negative mg/dl    Ketones, UA 15 (1+) (A) Negative mg/dl    Urobilinogen, UA 0 2 0 2, 1 0 E U /dl E U /dl    Bilirubin, UA Negative Negative    Blood, UA Negative Negative   Platelet count    Collection Time: 08/07/19  8:22 PM   Result Value Ref Range    Platelets 528 380 - 019 Thousands/uL    MPV 9 6 8 9 - 12 7 fL   Comprehensive metabolic panel    Collection Time: 08/08/19  6:12 AM   Result Value Ref Range    Sodium 140 136 - 145 mmol/L    Potassium 3 7 3 5 - 5 3 mmol/L    Chloride 107 100 - 108 mmol/L    CO2 26 21 - 32 mmol/L    ANION GAP 7 4 - 13 mmol/L    BUN 14 5 - 25 mg/dL    Creatinine 1 03 0 60 - 1 30 mg/dL    Glucose 98 65 - 140 mg/dL    Calcium 8 0 (L) 8 3 - 10 1 mg/dL    AST 12 5 - 45 U/L    ALT 20 12 - 78 U/L    Alkaline Phosphatase 60 46 - 116 U/L    Total Protein 6 0 (L) 6 4 - 8 2 g/dL    Albumin 2 9 (L) 3 5 - 5 0 g/dL    Total Bilirubin 0 20 0 20 - 1 00 mg/dL    eGFR 86 ml/min/1 73sq m   Magnesium    Collection Time: 08/08/19  6:12 AM   Result Value Ref Range    Magnesium 1 8 1 6 - 2 6 mg/dL   Phosphorus    Collection Time: 08/08/19  6:12 AM   Result Value Ref Range    Phosphorus 3 6 2 7 - 4 5 mg/dL   CBC and differential    Collection Time: 08/08/19  6:12 AM   Result Value Ref Range    WBC 6 27 4 31 - 10 16 Thousand/uL    RBC 3 61 (L) 3 81 - 5 12 Million/uL    Hemoglobin 9 0 (L) 11 5 - 15 4 g/dL    Hematocrit 30 9 (L) 34 8 - 46 1 %    MCV 86 82 - 98 fL    MCH 24 9 (L) 26 8 - 34 3 pg    MCHC 29 1 (L) 31 4 - 37 4 g/dL    RDW 18 6 (H) 11 6 - 15 1 %    MPV 9 9 8 9 - 12 7 fL    Platelets 764 241 - 086 Thousands/uL    nRBC 0 /100 WBCs    Neutrophils Relative 40 (L) 43 - 75 %    Immat GRANS % 0 0 - 2 %    Lymphocytes Relative 47 (H) 14 - 44 %    Monocytes Relative 9 4 - 12 %    Eosinophils Relative 3 0 - 6 %    Basophils Relative 1 0 - 1 %    Neutrophils Absolute 2 50 1 85 - 7 62 Thousands/µL    Immature Grans Absolute 0 01 0 00 - 0 20 Thousand/uL    Lymphocytes Absolute 3 01 0 60 - 4 47 Thousands/µL    Monocytes Absolute 0 54 0 17 - 1 22 Thousand/µL    Eosinophils Absolute 0 16 0 00 - 0 61 Thousand/µL    Basophils Absolute 0 05 0 00 - 0 10 Thousands/µL   Acetaminophen level-"If concentration is detectable, please discuss with medical  on call "    Collection Time: 08/08/19  6:12 AM   Result Value Ref Range    Acetaminophen Level <2 (L) 10 - 20 ug/mL       Current Facility-Administered Medications   Medication Dose Route Frequency Provider Last Rate Last Dose    enoxaparin (LOVENOX) subcutaneous injection 40 mg  40 mg Subcutaneous Daily Elida Suarez, DO   40 mg at 08/07/19 1751    nicotine (NICODERM CQ) 7 mg/24hr TD 24 hr patch 1 patch  1 patch Transdermal Daily Elida Suarez DO   1 patch at 08/07/19 1753       Invasive Devices     Peripheral Intravenous Line            Peripheral IV 08/07/19 Left Antecubital less than 1 day                Lab, Imaging and other studies: I have personally reviewed pertinent reports      VTE Pharmacologic Prophylaxis: Enoxaparin (Lovenox)  VTE Mechanical Prophylaxis: sequential compression device    [employer], DO

## 2019-08-08 NOTE — DISCHARGE INSTRUCTIONS
Acetaminophen Overdose   WHAT YOU NEED TO KNOW:   Acetaminophen overdose means taking more than it is safe to take  It may also be called acetaminophen poisoning  Acetaminophen is called paracetamol in countries outside the [place]  When used correctly, acetaminophen is a safe drug that decreases pain and fever  Many medicines contain acetaminophen, including some that you can buy without a prescription  DISCHARGE INSTRUCTIONS:   Seek care immediately if:   · You or another person took too much acetaminophen  · You feel confused or more tired than usual, or you are sweating more than usual     · You have severe nausea and are vomiting  · You cannot have a bowel movement or urinate  · Your skin and the whites of your eyes turn yellow  Contact your healthcare provider if:   · You have a fever  · You have taken too much acetaminophen by mistake, even if you do not have any signs or symptoms  · You have pain in the upper right side of your abdomen  · You have questions or concerns about your condition or care  Follow up with your healthcare provider as directed:  Write down your questions so you remember to ask them during your visits  If you think you took too much acetaminophen:  Immediately call the [facility] at [phone]   Prevent an acetaminophen overdose:   · Read labels carefully  Read the labels of all the medicines you take  If your medicine contains acetaminophen, it will be listed in the active ingredients section  Acetaminophen may be listed on the label as APAP, Acetaminoph, Acetaminop, Acetamin, or Acetam  Check carefully to see if the acetaminophen is a regular or extended-release form  · Do not take more than 1 type of acetaminophen at a time  Many combination medicines contain acetaminophen  Make sure the total dose of acetaminophen you take is not more than 4,000 milligrams (4 grams) in 1 day   Ask your healthcare provider if you are not sure how much you are taking  Check other medicines to see if they contain acetaminophen  Do not  take these medicines together with acetaminophen  The combined amount of acetaminophen may be too much  · Take the correct dose  Make sure you take the right amount and wait the right number of hours between doses  Never take more than the label says to take  Do not take acetaminophen for more days than directed  If the medicine came with a device such as a spoon or dropper, use it to measure your medicine  · Do not take acetaminophen for too many days in a row  Do not take acetaminophen for more than 10 days to treat pain, unless your healthcare provider tells you to  Do not take acetaminophen for more than 3 days to treat a fever, unless your healthcare provider tells you to  Your pain or fever may need to be treated another way if it lasts longer than a few days  © 2017 [facility] Information is for End User's use only and may not be sold, redistributed or otherwise used for commercial purposes  All illustrations and images included in CareNotes® are the copyrighted property of [employer] , [employer]  or [employer]  The above information is an  only  It is not intended as medical advice for individual conditions or treatments  Talk to your doctor, nurse or pharmacist before following any medical regimen to see if it is safe and effective for you

## 2019-08-09 LAB
C TRACH DNA SPEC QL NAA+PROBE: NEGATIVE
MRSA NOSE QL CULT: NORMAL
N GONORRHOEA DNA SPEC QL NAA+PROBE: NEGATIVE

## 2019-08-29 ENCOUNTER — HOSPITAL ENCOUNTER (EMERGENCY)
Facility: HOSPITAL | Age: 29
Discharge: HOME | End: 2019-08-29
Attending: EMERGENCY MEDICINE | Admitting: EMERGENCY MEDICINE
Payer: COMMERCIAL

## 2019-08-29 VITALS
OXYGEN SATURATION: 99 % | HEART RATE: 83 BPM | SYSTOLIC BLOOD PRESSURE: 140 MMHG | WEIGHT: 184.4 LBS | DIASTOLIC BLOOD PRESSURE: 81 MMHG | BODY MASS INDEX: 33.73 KG/M2 | TEMPERATURE: 98.2 F | RESPIRATION RATE: 16 BRPM

## 2019-08-29 DIAGNOSIS — N94.6 MENSES PAINFUL: ICD-10-CM

## 2019-08-29 DIAGNOSIS — N93.9 VAGINAL BLEEDING: Primary | ICD-10-CM

## 2019-08-29 LAB
B-HCG SERPL-ACNC: <2 MIU/ML
BASOPHILS # BLD AUTO: 0.05 THOUSANDS/ΜL (ref 0–0.1)
BASOPHILS NFR BLD AUTO: 0 % (ref 0–1)
EOSINOPHIL # BLD AUTO: 0.14 THOUSAND/ΜL (ref 0–0.61)
EOSINOPHIL NFR BLD AUTO: 1 % (ref 0–6)
ERYTHROCYTE [DISTWIDTH] IN BLOOD BY AUTOMATED COUNT: 18.4 % (ref 11.6–15.1)
HCT VFR BLD AUTO: 35.7 % (ref 34.8–46.1)
HGB BLD-MCNC: 10.3 G/DL (ref 11.5–15.4)
IMM GRANULOCYTES # BLD AUTO: 0.03 THOUSAND/UL (ref 0–0.2)
IMM GRANULOCYTES NFR BLD AUTO: 0 % (ref 0–2)
LYMPHOCYTES # BLD AUTO: 3.57 THOUSANDS/ΜL (ref 0.6–4.47)
LYMPHOCYTES NFR BLD AUTO: 31 % (ref 14–44)
MCH RBC QN AUTO: 25.1 PG (ref 26.8–34.3)
MCHC RBC AUTO-ENTMCNC: 28.9 G/DL (ref 31.4–37.4)
MCV RBC AUTO: 87 FL (ref 82–98)
MONOCYTES # BLD AUTO: 0.68 THOUSAND/ΜL (ref 0.17–1.22)
MONOCYTES NFR BLD AUTO: 6 % (ref 4–12)
NEUTROPHILS # BLD AUTO: 7.09 THOUSANDS/ΜL (ref 1.85–7.62)
NEUTS SEG NFR BLD AUTO: 62 % (ref 43–75)
NRBC BLD AUTO-RTO: 0 /100 WBCS
PLATELET # BLD AUTO: 455 THOUSANDS/UL (ref 149–390)
PMV BLD AUTO: 9.3 FL (ref 8.9–12.7)
RBC # BLD AUTO: 4.1 MILLION/UL (ref 3.81–5.12)
WBC # BLD AUTO: 11.56 THOUSAND/UL (ref 4.31–10.16)

## 2019-08-29 PROCEDURE — 87070 CULTURE OTHR SPECIMN AEROBIC: CPT | Performed by: EMERGENCY MEDICINE

## 2019-08-29 PROCEDURE — 87077 CULTURE AEROBIC IDENTIFY: CPT | Performed by: EMERGENCY MEDICINE

## 2019-08-29 PROCEDURE — 87186 SC STD MICRODIL/AGAR DIL: CPT | Performed by: EMERGENCY MEDICINE

## 2019-08-29 PROCEDURE — 36415 COLL VENOUS BLD VENIPUNCTURE: CPT | Performed by: EMERGENCY MEDICINE

## 2019-08-29 PROCEDURE — 87491 CHLMYD TRACH DNA AMP PROBE: CPT | Performed by: EMERGENCY MEDICINE

## 2019-08-29 PROCEDURE — 84702 CHORIONIC GONADOTROPIN TEST: CPT | Performed by: EMERGENCY MEDICINE

## 2019-08-29 PROCEDURE — 87591 N.GONORRHOEAE DNA AMP PROB: CPT | Performed by: EMERGENCY MEDICINE

## 2019-08-29 PROCEDURE — 99284 EMERGENCY DEPT VISIT MOD MDM: CPT

## 2019-08-29 PROCEDURE — 85025 COMPLETE CBC W/AUTO DIFF WBC: CPT | Performed by: EMERGENCY MEDICINE

## 2019-08-29 RX ORDER — IBUPROFEN 600 MG/1
600 TABLET ORAL ONCE
Status: COMPLETED | OUTPATIENT
Start: 2019-08-29 | End: 2019-08-29

## 2019-08-29 RX ADMIN — IBUPROFEN 600 MG: 600 TABLET ORAL at 21:05

## 2019-08-30 NOTE — ED PROVIDER NOTES
History  Chief Complaint   Patient presents with    Vaginal Bleeding - Pregnant     Pt states that she started with vaginal bleeding and cramping about 1 hour ago  Pt states that she is roughly 5 weeks pregnant  Pt states that pain radiates from front to back on the L side  32 yo female  says she took pregnancy test 3 days ago and it was positive  LMP was  and lasted one week  She is s/p D&C on 7/10 for missed   She was then seen on  for an accidental tylenol overdose and had negative urine pregnancy test then  Pt  Complains of vaginal bleeding and pelvic cramping that started one hour ago  No fever, vomiting, diarrhea  Does not use birth control  Wants to get her tubes tied  History provided by:  Patient   used: No        Prior to Admission Medications   Prescriptions Last Dose Informant Patient Reported? Taking? Prenatal Vit-Fe Fumarate-FA (PRENATAL VITAMIN) 28-0 8 mg  Self Yes No   Sig: Take by mouth      Facility-Administered Medications: None       Past Medical History:   Diagnosis Date    Anemia     Bipolar 1 disorder ([address] )     Obesity     Psychiatric disorder     bipolar    UTI (urinary tract infection) 2019    Varicella     Child Hx       Past Surgical History:   Procedure Laterality Date     SECTION      OH  DELIVERY ONLY Bilateral 10/21/2017    Procedure:  SECTION (); Surgeon: Govind Darling MD;  [facility];  Service: Obstetrics    OH LAP,DIAGNOSTIC ABDOMEN N/A 2016    Procedure: EXPLORATORY LAPAROTOMY, LEFT SALPINGECTOMY;  Surgeon: Eloy Centeno MD;  Location:  MAIN OR;  Service: Gynecology    OH SURG RX MISSED ABORTN,1ST TRI N/A 7/10/2019    Procedure: DILATATION AND EVACUATION (D&E) (8 WEEKS);   Surgeon: Antonio Marion MD;  Location: [facility];  Service: Gynecology       Family History   Problem Relation Age of Onset   [facility] HIV Mother     No Known Problems Father     Breast cancer Maternal Grandmother     Hypertension Maternal Grandmother     Diabetes Paternal Grandmother      I have reviewed and agree with the history as documented  Social History     Tobacco Use    Smoking status: Current Every Day Smoker     Packs/day: 0 25     Years: 15 00     Pack years: 3 75    Smokeless tobacco: Never Used    Tobacco comment: Pt is uninterested in quitting smoking  Substance Use Topics    Alcohol use: Not Currently     Frequency: Never     Binge frequency: Never    Drug use: No        Review of Systems   Constitutional: Negative  Negative for fever  HENT: Negative  Eyes: Negative  Respiratory: Negative  Negative for cough and shortness of breath  Cardiovascular: Negative  Negative for chest pain  Gastrointestinal: Negative  Negative for abdominal pain, diarrhea, nausea and vomiting  Genitourinary: Positive for vaginal bleeding  Negative for dysuria and flank pain  Musculoskeletal: Negative  Negative for back pain and myalgias  Skin: Negative  Negative for rash and wound  Neurological: Negative  Negative for dizziness and headaches  Hematological: Does not bruise/bleed easily  Psychiatric/Behavioral: Negative  All other systems reviewed and are negative  Physical Exam  Physical Exam   Constitutional: She is oriented to person, place, and time  She appears well-developed and well-nourished  No distress  HENT:   Head: Normocephalic and atraumatic  Neck: Neck supple  Pulmonary/Chest: Effort normal    Genitourinary:   Genitourinary Comments: Small amount blood from os but os closed  Musculoskeletal: Normal range of motion  Neurological: She is alert and oriented to person, place, and time  Skin: Skin is warm and dry  She is not diaphoretic  Psychiatric: She has a normal mood and affect  Her behavior is normal    Nursing note and vitals reviewed        Vital Signs  ED Triage Vitals [08/29/19 1954]   Temperature Pulse Respirations Blood Pressure SpO2   98 2 °F (36 8 °C) 83 16 140/81 99 %      Temp Source Heart Rate Source Patient Position - Orthostatic VS BP Location FiO2 (%)   Tympanic -- Sitting Right arm --      Pain Score       Worst Possible Pain           Vitals:    08/29/19 1954   BP: 140/81   Pulse: 83   Patient Position - Orthostatic VS: Sitting         Visual Acuity      ED Medications  Medications   ibuprofen (MOTRIN) tablet 600 mg (has no administration in time range)       Diagnostic Studies  Results Reviewed     Procedure Component Value Units Date/Time    Quantitative hCG [[id number]]  (Normal) Collected:  08/29/19 2020    Lab Status:  Final result Specimen:  Blood from Arm, Right Updated:  08/29/19 2053     HCG, Quant <2 mIU/mL     Narrative:        Expected Ranges:     Approximate               Approximate HCG  Gestation age          Concentration ( mIU/mL)  _____________          ______________________   [address]                      HCG values  0 2-1                       5-50  1-2                           2-3                         100-5000  3-4                         500-58455  4-5                         1000-28069  5-6                         [id number]  6-8                         [id number]  8-12                        [id number]      CBC and differential [[id number]]  (Abnormal) Collected:  08/29/19 2020    Lab Status:  Final result Specimen:  Blood from Arm, Right Updated:  08/29/19 2024     WBC 11 56 Thousand/uL      RBC 4 10 Million/uL      Hemoglobin 10 3 g/dL      Hematocrit 35 7 %      MCV 87 fL      MCH 25 1 pg      MCHC 28 9 g/dL      RDW 18 4 %      MPV 9 3 fL      Platelets 639 Thousands/uL      nRBC 0 /100 WBCs      Neutrophils Relative 62 %      Immat GRANS % 0 %      Lymphocytes Relative 31 %      Monocytes Relative 6 %      Eosinophils Relative 1 %      Basophils Relative 0 %      Neutrophils Absolute 7 09 Thousands/µL      Immature Grans Absolute 0 03 Thousand/uL      Lymphocytes Absolute 3 57 Thousands/µL      Monocytes Absolute 0 68 Thousand/µL      Eosinophils Absolute 0 14 Thousand/µL      Basophils Absolute 0 05 Thousands/µL     Chlamydia/GC amplified DNA by PCR [[id number]]     Lab Status:  No result Specimen:  Cervix     Genital Comprehensive Culture [[id number]]     Lab Status:  No result Specimen:  Genital                  No orders to display              Procedures  Procedures       ED Course                               MDM  Number of Diagnoses or Management Options  Diagnosis management comments: 2053 - quant HCG is negative  I did confirm with blood bank that pt  Is O positive  Suspect pt  Getting normal menses  Advised rest, fluids, tylenol/advil prn  Follow up outpt  Disposition  Final diagnoses:   Vaginal bleeding   Menses painful     Time reflects when diagnosis was documented in both MDM as applicable and the Disposition within this note     Time User Action Codes Description Comment    5/61/6700  3:06 PM Jesus Husbands A Add [D82 6] Vaginal bleeding     8/09/2774  0:00 PM Ted Alex Add [A13 9] Menses painful       ED Disposition     ED Disposition Condition Date/Time Comment    Discharge Stable u Aug 29, 2019  8:55 PM Bertha Hard discharge to home/self care  Follow-up Information     Follow up With Specialties Details Why Contact Info    Selma Heimlich, MD Family Medicine Schedule an appointment as soon as possible for a visit  As needed [employer]  [address] [facility]  [phone]            Patient's Medications   Discharge Prescriptions    No medications on file     No discharge procedures on file      ED Provider  Electronically Signed by           Lily Vazquez MD  20/68/44 5089       Lily Vazquez MD  27/12/01 7808

## 2019-09-01 LAB
BACTERIA GENITAL AEROBE CULT: ABNORMAL
BACTERIA GENITAL AEROBE CULT: ABNORMAL

## 2019-09-02 LAB
C TRACH DNA SPEC QL NAA+PROBE: NEGATIVE
N GONORRHOEA DNA SPEC QL NAA+PROBE: NEGATIVE

## 2019-12-29 ENCOUNTER — HOSPITAL ENCOUNTER (EMERGENCY)
Facility: HOSPITAL | Age: 29
Discharge: HOME | End: 2019-12-29
Attending: EMERGENCY MEDICINE | Admitting: EMERGENCY MEDICINE
Payer: COMMERCIAL

## 2019-12-29 ENCOUNTER — APPOINTMENT (EMERGENCY)
Dept: RADIOLOGY | Facility: HOSPITAL | Age: 29
End: 2019-12-29
Payer: COMMERCIAL

## 2019-12-29 VITALS
BODY MASS INDEX: 33.11 KG/M2 | RESPIRATION RATE: 18 BRPM | TEMPERATURE: 98.4 F | WEIGHT: 181 LBS | OXYGEN SATURATION: 100 % | HEART RATE: 63 BPM | DIASTOLIC BLOOD PRESSURE: 59 MMHG | SYSTOLIC BLOOD PRESSURE: 113 MMHG

## 2019-12-29 DIAGNOSIS — Z34.90 PREGNANCY: ICD-10-CM

## 2019-12-29 DIAGNOSIS — O20.0 THREATENED MISCARRIAGE: ICD-10-CM

## 2019-12-29 DIAGNOSIS — R10.9 ABDOMINAL PAIN: Primary | ICD-10-CM

## 2019-12-29 LAB
ABO GROUP BLD: NORMAL
ALBUMIN SERPL BCP-MCNC: 3.7 G/DL (ref 3.5–5)
ALP SERPL-CCNC: 65 U/L (ref 46–116)
ALT SERPL W P-5'-P-CCNC: 16 U/L (ref 12–78)
ANION GAP SERPL CALCULATED.3IONS-SCNC: 10 MMOL/L (ref 4–13)
AST SERPL W P-5'-P-CCNC: 11 U/L (ref 5–45)
B-HCG SERPL-ACNC: ABNORMAL MIU/ML
BASOPHILS # BLD AUTO: 0.04 THOUSANDS/ΜL (ref 0–0.1)
BASOPHILS NFR BLD AUTO: 1 % (ref 0–1)
BILIRUB SERPL-MCNC: 0.4 MG/DL (ref 0.2–1)
BILIRUB UR QL STRIP: NEGATIVE
BUN SERPL-MCNC: 13 MG/DL (ref 5–25)
CALCIUM SERPL-MCNC: 8.8 MG/DL (ref 8.3–10.1)
CHLORIDE SERPL-SCNC: 104 MMOL/L (ref 100–108)
CLARITY UR: NORMAL
CO2 SERPL-SCNC: 23 MMOL/L (ref 21–32)
COLOR UR: YELLOW
CREAT SERPL-MCNC: 0.7 MG/DL (ref 0.6–1.3)
EOSINOPHIL # BLD AUTO: 0.07 THOUSAND/ΜL (ref 0–0.61)
EOSINOPHIL NFR BLD AUTO: 1 % (ref 0–6)
ERYTHROCYTE [DISTWIDTH] IN BLOOD BY AUTOMATED COUNT: 15.9 % (ref 11.6–15.1)
EXT PREG TEST URINE: POSITIVE
EXT. CONTROL ED NAV: NORMAL
GFR SERPL CREATININE-BSD FRML MDRD: 136 ML/MIN/1.73SQ M
GLUCOSE SERPL-MCNC: 100 MG/DL (ref 65–140)
GLUCOSE UR STRIP-MCNC: NEGATIVE MG/DL
HCT VFR BLD AUTO: 36 % (ref 34.8–46.1)
HGB BLD-MCNC: 11.1 G/DL (ref 11.5–15.4)
HGB UR QL STRIP.AUTO: NEGATIVE
IMM GRANULOCYTES # BLD AUTO: 0.02 THOUSAND/UL (ref 0–0.2)
IMM GRANULOCYTES NFR BLD AUTO: 0 % (ref 0–2)
KETONES UR STRIP-MCNC: NEGATIVE MG/DL
LEUKOCYTE ESTERASE UR QL STRIP: NEGATIVE
LYMPHOCYTES # BLD AUTO: 2.22 THOUSANDS/ΜL (ref 0.6–4.47)
LYMPHOCYTES NFR BLD AUTO: 35 % (ref 14–44)
MCH RBC QN AUTO: 27 PG (ref 26.8–34.3)
MCHC RBC AUTO-ENTMCNC: 30.8 G/DL (ref 31.4–37.4)
MCV RBC AUTO: 88 FL (ref 82–98)
MONOCYTES # BLD AUTO: 0.42 THOUSAND/ΜL (ref 0.17–1.22)
MONOCYTES NFR BLD AUTO: 7 % (ref 4–12)
NEUTROPHILS # BLD AUTO: 3.58 THOUSANDS/ΜL (ref 1.85–7.62)
NEUTS SEG NFR BLD AUTO: 56 % (ref 43–75)
NITRITE UR QL STRIP: NEGATIVE
NRBC BLD AUTO-RTO: 0 /100 WBCS
PH UR STRIP.AUTO: 5.5 [PH]
PLATELET # BLD AUTO: 328 THOUSANDS/UL (ref 149–390)
PMV BLD AUTO: 9.7 FL (ref 8.9–12.7)
POTASSIUM SERPL-SCNC: 3.9 MMOL/L (ref 3.5–5.3)
PROT SERPL-MCNC: 7.3 G/DL (ref 6.4–8.2)
PROT UR STRIP-MCNC: NEGATIVE MG/DL
RBC # BLD AUTO: 4.11 MILLION/UL (ref 3.81–5.12)
RH BLD: POSITIVE
SODIUM SERPL-SCNC: 137 MMOL/L (ref 136–145)
SP GR UR STRIP.AUTO: 1.02 (ref 1–1.03)
UROBILINOGEN UR QL STRIP.AUTO: 0.2 E.U./DL
WBC # BLD AUTO: 6.35 THOUSAND/UL (ref 4.31–10.16)

## 2019-12-29 PROCEDURE — 76815 OB US LIMITED FETUS(S): CPT

## 2019-12-29 PROCEDURE — 86900 BLOOD TYPING SEROLOGIC ABO: CPT | Performed by: EMERGENCY MEDICINE

## 2019-12-29 PROCEDURE — 36415 COLL VENOUS BLD VENIPUNCTURE: CPT | Performed by: EMERGENCY MEDICINE

## 2019-12-29 PROCEDURE — 84702 CHORIONIC GONADOTROPIN TEST: CPT | Performed by: EMERGENCY MEDICINE

## 2019-12-29 PROCEDURE — 86901 BLOOD TYPING SEROLOGIC RH(D): CPT | Performed by: EMERGENCY MEDICINE

## 2019-12-29 PROCEDURE — 81025 URINE PREGNANCY TEST: CPT | Performed by: EMERGENCY MEDICINE

## 2019-12-29 PROCEDURE — 80053 COMPREHEN METABOLIC PANEL: CPT | Performed by: EMERGENCY MEDICINE

## 2019-12-29 PROCEDURE — 85025 COMPLETE CBC W/AUTO DIFF WBC: CPT | Performed by: EMERGENCY MEDICINE

## 2019-12-29 PROCEDURE — 81003 URINALYSIS AUTO W/O SCOPE: CPT | Performed by: EMERGENCY MEDICINE

## 2019-12-29 PROCEDURE — 99284 EMERGENCY DEPT VISIT MOD MDM: CPT

## 2019-12-29 NOTE — DISCHARGE INSTRUCTIONS
Please get a repeat beta HCG in 2 days and a follow-up transvaginal ultrasound in 1 week  Prescriptions are being provided to you  Please call [facility] tomorrow to make an appointment for follow-up OBGYN appointment

## 2019-12-29 NOTE — ED PROVIDER NOTES
History  Chief Complaint   Patient presents with    Abdominal Pain     bilat low abd pain for a couple of days  intermittent, none now  denies any trouble urinating or defecating, occ nausea     29 y/o female presents with bilateral lower abdominal pain for a few days  Unknown LMP  Patient is  history of prior miscarriages and ectopic pregnancy  Denies any nausea,vomiting,fevers,chills, dysuria, urgency or frequency  Denies dyspareunia, abnormal vaginal discharge or bleeding  History provided by:  Patient   used: No        Prior to Admission Medications   Prescriptions Last Dose Informant Patient Reported? Taking? Prenatal Vit-Fe Fumarate-FA (PRENATAL VITAMIN) 28-0 8 mg Not Taking at Unknown time Self Yes No   Sig: Take by mouth      Facility-Administered Medications: None       Past Medical History:   Diagnosis Date    Anemia     Bipolar 1 disorder ([address] )     Obesity     Psychiatric disorder     bipolar    UTI (urinary tract infection) 2019    Varicella     Child Hx       Past Surgical History:   Procedure Laterality Date     SECTION      VT  DELIVERY ONLY Bilateral 10/21/2017    Procedure:  SECTION (); Surgeon: Deandre Roe MD;  Location: [facility];  Service: Obstetrics    VT LAP,DIAGNOSTIC ABDOMEN N/A 2016    Procedure: EXPLORATORY LAPAROTOMY, LEFT SALPINGECTOMY;  Surgeon: Alisha Livingston MD;  Location:  MAIN OR;  Service: Gynecology    VT SURG RX MISSED ABORTN,1ST TRI N/A 7/10/2019    Procedure: DILATATION AND EVACUATION (D&E) (8 WEEKS); Surgeon: Joana Fernández MD;  Location: [facility];  Service: Gynecology       Family History   Problem Relation Age of Onset   Morales HIV Mother     No Known Problems Father     Breast cancer Maternal Grandmother     Hypertension Maternal Grandmother     Diabetes Paternal Grandmother      I have reviewed and agree with the history as documented      Social History     Tobacco Use    Smoking status: Current Every Day Smoker     Packs/day: 0 25     Years: 15 00     Pack years: 3 75    Smokeless tobacco: Never Used    Tobacco comment: Pt is uninterested in quitting smoking  Substance Use Topics    Alcohol use: Not Currently     Frequency: Never     Binge frequency: Never    Drug use: No        Review of Systems   All other systems reviewed and are negative  Physical Exam  Physical Exam   Constitutional: She is oriented to person, place, and time  She appears well-developed and well-nourished  HENT:   Head: Normocephalic and atraumatic  Eyes: Pupils are equal, round, and reactive to light  EOM are normal    Neck: Normal range of motion  Neck supple  Cardiovascular: Normal rate and regular rhythm  Pulmonary/Chest: Effort normal and breath sounds normal    Abdominal: Soft  Bowel sounds are normal    Musculoskeletal: Normal range of motion  Neurological: She is alert and oriented to person, place, and time  Skin: Skin is warm and dry  Psychiatric: She has a normal mood and affect  Nursing note and vitals reviewed        Vital Signs  ED Triage Vitals [12/29/19 1155]   Temperature Pulse Respirations Blood Pressure SpO2   97 8 °F (36 6 °C) (!) 109 20 130/77 99 %      Temp Source Heart Rate Source Patient Position - Orthostatic VS BP Location FiO2 (%)   Tympanic Monitor Sitting Right arm --      Pain Score       No Pain           Vitals:    12/29/19 1155 12/29/19 1613   BP: 130/77 113/59   Pulse: (!) 109 63   Patient Position - Orthostatic VS: Sitting Sitting         Visual Acuity      ED Medications  Medications - No data to display    Diagnostic Studies  Results Reviewed     Procedure Component Value Units Date/Time    hCG, quantitative [[id number]]  (Abnormal) Collected:  12/29/19 1250    Lab Status:  Final result Specimen:  Blood from Arm, Right Updated:  12/29/19 1333     HCG, Quant 14,637 mIU/mL     Narrative:        Expected Ranges:     Approximate               Approximate HCG  Gestation age          Concentration ( mIU/mL)  _____________          ______________________   Deepika West                      HCG values  0 2-1                       5-50  1-2                           2-3                         100-5000  3-4                         500-61965  4-5                         1000-66805  5-6                         [id number]  6-8                         [id number]  8-12                        [id number]      Comprehensive metabolic panel [[id number]] Collected:  12/29/19 1250    Lab Status:  Final result Specimen:  Blood from Arm, Right Updated:  12/29/19 1312     Sodium 137 mmol/L      Potassium 3 9 mmol/L      Chloride 104 mmol/L      CO2 23 mmol/L      ANION GAP 10 mmol/L      BUN 13 mg/dL      Creatinine 0 70 mg/dL      Glucose 100 mg/dL      Calcium 8 8 mg/dL      AST 11 U/L      ALT 16 U/L      Alkaline Phosphatase 65 U/L      Total Protein 7 3 g/dL      Albumin 3 7 g/dL      Total Bilirubin 0 40 mg/dL      eGFR 136 ml/min/1 73sq m     Narrative:       [place] guidelines for Chronic Kidney Disease (CKD):     Stage 1 with normal or high GFR (GFR > 90 mL/min/1 73 square meters)    Stage 2 Mild CKD (GFR = 60-89 mL/min/1 73 square meters)    Stage 3A Moderate CKD (GFR = 45-59 mL/min/1 73 square meters)    Stage 3B Moderate CKD (GFR = 30-44 mL/min/1 73 square meters)    Stage 4 Severe CKD (GFR = 15-29 mL/min/1 73 square meters)    Stage 5 End Stage CKD (GFR <15 mL/min/1 73 square meters)  Note: GFR calculation is accurate only with a steady state creatinine    CBC and differential [[id number]]  (Abnormal) Collected:  12/29/19 1250    Lab Status:  Final result Specimen:  Blood from Arm, Right Updated:  12/29/19 1255     WBC 6 35 Thousand/uL      RBC 4 11 Million/uL      Hemoglobin 11 1 g/dL      Hematocrit 36 0 %      MCV 88 fL      MCH 27 0 pg      MCHC 30 8 g/dL      RDW 15 9 %      MPV 9 7 fL      Platelets 134 Thousands/uL      nRBC 0 /100 WBCs      Neutrophils Relative 56 %      Immat GRANS % 0 %      Lymphocytes Relative 35 %      Monocytes Relative 7 %      Eosinophils Relative 1 %      Basophils Relative 1 %      Neutrophils Absolute 3 58 Thousands/µL      Immature Grans Absolute 0 02 Thousand/uL      Lymphocytes Absolute 2 22 Thousands/µL      Monocytes Absolute 0 42 Thousand/µL      Eosinophils Absolute 0 07 Thousand/µL      Basophils Absolute 0 04 Thousands/µL     UA (URINE) with reflex to Scope [[id number]] Collected:  12/29/19 1215    Lab Status:  Final result Specimen:  Urine, Clean Catch Updated:  12/29/19 1223     Color, UA Yellow     Clarity, UA Slightly Cloudy     Specific [place], UA 1 020     pH, UA 5 5     Leukocytes, UA Negative     Nitrite, UA Negative     Protein, UA Negative mg/dl      Glucose, UA Negative mg/dl      Ketones, UA Negative mg/dl      Urobilinogen, UA 0 2 E U /dl      Bilirubin, UA Negative     Blood, UA Negative    POCT pregnancy, urine [[id number]]  (Normal) Resulted:  12/29/19 1221    Lab Status:  Final result Updated:  12/29/19 1221     EXT PREG TEST UR (Ref: Negative) positive     Control valid                 US OB pregnancy limited with transvaginal   Final Result by Valentine Rachel MD (12/29 1624)      There is an intrauterine gestational sac, sac size suggesting estimated gestational age of approximately 5 weeks 1 day (11 mm)  Yolk sac is seen but no fetal pole or cardiac activity   Viability cannot be confirmed at this early stage of pregnancy with follow-up serial quantitative beta hCG levels and/or follow-up ultrasound as needed              Workstation performed: [id number]         US pelvis complete w transvaginal    (Results Pending)              Procedures  Procedures         ED Course                               MDM  Number of Diagnoses or Management Options  Abdominal pain:   Pregnancy:   Threatened miscarriage:   Diagnosis management comments: Patient was evaluated with labs, UA, Transvaginal US  Patient given outpatient repeat beta hcg quantitative results in 48 hours and a transvaginal US prescription in a week  She will follow up with [facility] in a few days  Patient and  verbalized understanding of instructions and had no further questions at the time of discharge  Amount and/or Complexity of Data Reviewed  Clinical lab tests: ordered and reviewed  Tests in the radiology section of CPT®: ordered and reviewed  Tests in the medicine section of CPT®: ordered and reviewed    Patient Progress  Patient progress: stable        Disposition  Final diagnoses:   Abdominal pain   Pregnancy   Threatened miscarriage     Time reflects when diagnosis was documented in both MDM as applicable and the Disposition within this note     Time User Action Codes Description Comment    12/29/2019  5:03 PM DeanuMarian Add [R10 9] Abdominal pain     12/29/2019  5:03 PM Marian Wooten Add [Z34 90] Pregnancy     12/29/2019  5:05 PM Iona Wooten Add [O20 0] Threatened miscarriage       ED Disposition     ED Disposition Condition Date/Time Comment    Discharge Stable Sun Dec 29, 2019  5:03 PM Alphonso Portal discharge to home/self care  Follow-up Information     Follow up With Specialties Details Why Contact Info Additional Information    [address] [facility] Emergency Department Emergency Medicine  If symptoms worsen [address] [facility]  [phone] [facility], [place], [place], [address]          Discharge Medication List as of 12/29/2019  5:05 PM      CONTINUE these medications which have NOT CHANGED    Details   Prenatal Vit-Fe Fumarate-FA (PRENATAL VITAMIN) 28-0 8 mg Take by mouth, Historical Med           Outpatient Discharge Orders   US pelvis complete w transvaginal   Standing Status: Future Standing Exp  Date: 12/29/23     hCG, quantitative   Standing Status: Future Standing Exp   Date: 12/29/20       ED Provider  Electronically Signed by           Amy Marsh,   12/31/19 2024

## 2019-12-29 NOTE — ED NOTES
Pt made aware that she is not allowed to eat and drink at this time, awaiting for US result        Delbert Tolliver RN  12/29/19 1156

## 2020-01-21 ENCOUNTER — TELEPHONE (OUTPATIENT)
Dept: FAMILY MEDICINE CLINIC | Facility: CLINIC | Age: 30
End: 2020-01-21

## 2020-01-21 NOTE — TELEPHONE ENCOUNTER
Spoke to patient needs clearance for dental care, patient has not been seen since 7/19 by our office  She states she is unsure why she needs clearance  Merced Parker of [facility] [phone] states patient is pregnant and needs clearance has not been seen here for present pregnancy told we need to schedule apt Offered first apt of am with Dr Bryanna Ascencio  took phone angry and hung up on me

## 2020-02-01 ENCOUNTER — HOSPITAL ENCOUNTER (EMERGENCY)
Facility: HOSPITAL | Age: 30
Discharge: HOME | End: 2020-02-01
Attending: EMERGENCY MEDICINE | Admitting: EMERGENCY MEDICINE
Payer: COMMERCIAL

## 2020-02-01 VITALS
RESPIRATION RATE: 18 BRPM | HEART RATE: 96 BPM | WEIGHT: 174.16 LBS | SYSTOLIC BLOOD PRESSURE: 129 MMHG | OXYGEN SATURATION: 99 % | DIASTOLIC BLOOD PRESSURE: 72 MMHG | TEMPERATURE: 97.6 F | BODY MASS INDEX: 31.85 KG/M2

## 2020-02-01 DIAGNOSIS — O20.9 VAGINAL BLEEDING AFFECTING EARLY PREGNANCY: Primary | ICD-10-CM

## 2020-02-01 DIAGNOSIS — R11.0 NAUSEA: ICD-10-CM

## 2020-02-01 PROCEDURE — 99284 EMERGENCY DEPT VISIT MOD MDM: CPT | Performed by: EMERGENCY MEDICINE

## 2020-02-01 PROCEDURE — 99283 EMERGENCY DEPT VISIT LOW MDM: CPT

## 2020-02-01 RX ORDER — ACETAMINOPHEN 325 MG/1
975 TABLET ORAL ONCE
Status: COMPLETED | OUTPATIENT
Start: 2020-02-01 | End: 2020-02-01

## 2020-02-01 RX ORDER — PYRIDOXINE HCL (VITAMIN B6) 25 MG
25 TABLET ORAL DAILY
Qty: 7 TABLET | Refills: 0 | Status: SHIPPED | OUTPATIENT
Start: 2020-02-01 | End: 2020-04-08

## 2020-02-01 RX ADMIN — ACETAMINOPHEN 975 MG: 325 TABLET, FILM COATED ORAL at 09:37

## 2020-02-01 NOTE — ED PROVIDER NOTES
History  Chief Complaint   Patient presents with    Vaginal Bleeding - Pregnant     vaginal bleeding yesterday  no further bleeding but has cramping  had intercourse yesterday  10 weeks pregnant     HPI     26-year-old female, roughly 10 weeks pregnant presents emergency department for evaluation after bleeding yesterday  She had intercourse yesterday, subsequently had a small amount of bleeding  She has no bleeding today  She has had some lower abdominal cramping  She has some nausea without vomiting  She has previous ultrasound  She denies any recent falls or trauma  Prior to Admission Medications   Prescriptions Last Dose Informant Patient Reported? Taking? Prenatal Vit-Fe Fumarate-FA (PRENATAL VITAMIN) 28-0 8 mg 2020 at Unknown time Self Yes Yes   Sig: Take by mouth      Facility-Administered Medications: None       Past Medical History:   Diagnosis Date    Anemia     Bipolar 1 disorder ([address] )     Obesity     Psychiatric disorder     bipolar    UTI (urinary tract infection) 2019    Varicella     Child Hx       Past Surgical History:   Procedure Laterality Date     SECTION      CT  DELIVERY ONLY Bilateral 10/21/2017    Procedure:  SECTION (); Surgeon: Elsie Morales MD;  [facility];  Service: Obstetrics    CT LAP,DIAGNOSTIC ABDOMEN N/A 2016    Procedure: EXPLORATORY LAPAROTOMY, LEFT SALPINGECTOMY;  Surgeon: Shanice Cain MD;  Location:  MAIN OR;  Service: Gynecology    CT SURG RX MISSED ABORTN,1ST TRI N/A 7/10/2019    Procedure: DILATATION AND EVACUATION (D&E) (8 WEEKS); Surgeon: Brandee Laughlin MD;  Location: [facility];  Service: Gynecology       Family History   Problem Relation Age of Onset   Carlin Yee HIV Mother     No Known Problems Father     Breast cancer Maternal Grandmother     Hypertension Maternal Grandmother     Diabetes Paternal Grandmother      I have reviewed and agree with the history as documented      Social History Tobacco Use    Smoking status: Current Every Day Smoker     Packs/day: 0 25     Years: 15 00     Pack years: 3 75    Smokeless tobacco: Never Used    Tobacco comment: Pt is uninterested in quitting smoking  Substance Use Topics    Alcohol use: Not Currently     Frequency: Never     Binge frequency: Never    Drug use: No        Review of Systems   Constitutional: Negative for activity change, appetite change, chills, diaphoresis, fatigue and fever  HENT: Negative for congestion, dental problem, ear discharge, facial swelling, nosebleeds, rhinorrhea, sinus pressure and trouble swallowing  Eyes: Negative for photophobia, discharge, itching and visual disturbance  Respiratory: Negative for choking, chest tightness and shortness of breath  Cardiovascular: Negative for chest pain, palpitations and leg swelling  Gastrointestinal: Negative for abdominal distention, abdominal pain, constipation, diarrhea, nausea and vomiting  Abdominal cramping   Endocrine: Negative for polydipsia and polyphagia  Genitourinary: Positive for vaginal bleeding  Negative for decreased urine volume, difficulty urinating, dysuria, flank pain, frequency, hematuria and vaginal discharge  Musculoskeletal: Negative for back pain, gait problem, joint swelling, neck pain and neck stiffness  Skin: Negative for color change and rash  Neurological: Negative for dizziness, facial asymmetry, speech difficulty, weakness and light-headedness  Psychiatric/Behavioral: Negative for agitation and behavioral problems  The patient is not nervous/anxious and is not hyperactive  All other systems reviewed and are negative  Physical Exam  Physical Exam   Constitutional: She is oriented to person, place, and time  She appears well-developed and well-nourished  No distress  HENT:   Head: Normocephalic and atraumatic  Eyes: Pupils are equal, round, and reactive to light  EOM are normal    Neck: Normal range of motion  Neck supple  Cardiovascular: Normal rate, regular rhythm and normal heart sounds  No murmur heard  Pulmonary/Chest: Effort normal and breath sounds normal  No respiratory distress  She has no wheezes  She has no rales  Abdominal: Soft  Bowel sounds are normal  She exhibits no distension  There is no tenderness  There is no rebound and no guarding  Musculoskeletal: Normal range of motion  She exhibits no edema or deformity  Lymphadenopathy:     She has no cervical adenopathy  Neurological: She is alert and oriented to person, place, and time  No cranial nerve deficit  She exhibits normal muscle tone  Coordination normal    Skin: Capillary refill takes less than 2 seconds  No rash noted  No erythema  Psychiatric: She has a normal mood and affect  Her behavior is normal    Nursing note and vitals reviewed  Vital Signs  ED Triage Vitals [02/01/20 0902]   Temperature Pulse Respirations Blood Pressure SpO2   97 6 °F (36 4 °C) 96 18 129/72 99 %      Temp src Heart Rate Source Patient Position - Orthostatic VS BP Location FiO2 (%)   -- -- -- -- --      Pain Score       6           Vitals:    02/01/20 0902   BP: 129/72   Pulse: 96         Visual Acuity      ED Medications  Medications   acetaminophen (TYLENOL) tablet 975 mg (975 mg Oral Given 2/1/20 3064)       Diagnostic Studies  Results Reviewed     None                 No orders to display              Procedures  Procedures         ED Course                               MDM  Number of Diagnoses or Management Options  Nausea: new and requires workup  Vaginal bleeding affecting early pregnancy: new and requires workup  Diagnosis management comments: Resolved vaginal bleeding  Ten weeks pregnant  Has confirmed intrauterine pregnancy on previous ultrasound  Is Rh positive  No current bleeding  Diffuse crampy abdominal pain and nausea  Patient with no active bleeding in ER  Bedside ultrasound reveals fetal heart tones 140s  No active bleeding    No indication for RhoGAM as patient is Rh positive  Previous confirm intrauterine pregnancy  Possibly early miscarriage  Patient counseled on signs and symptoms that would warrant emergent re-evaluation  Strongly recommended following up with her outpatient OBGYN for further management of her pregnancy  Patient recommended pyridoxine and doxylamine for nausea and vomiting associated with early pregnancy  Amount and/or Complexity of Data Reviewed  Review and summarize past medical records: yes    Risk of Complications, Morbidity, and/or Mortality  Presenting problems: moderate  Diagnostic procedures: moderate  Management options: moderate    Patient Progress  Patient progress: stable        Disposition  Final diagnoses:   Vaginal bleeding affecting early pregnancy   Nausea     Time reflects when diagnosis was documented in both MDM as applicable and the Disposition within this note     Time User Action Codes Description Comment    2/1/2020 10:47 AM Bar Duet Add [O20 8] Vaginal bleeding affecting early pregnancy     2/1/2020 10:48 AM Bar Duet Add [R11 0] Nausea       ED Disposition     ED Disposition Condition Date/Time Comment    Discharge Stable Sat Feb 1, 2020 10:32 AM Cary Mcguire discharge to home/self care              Follow-up Information     Follow up With Specialties Details Why Contact Info Additional [address] Emergency Department Emergency Medicine Go to  If symptoms worsen [address] [facility]  [phone] [place], [place], [place], [address]    Your OBGYN  Schedule an appointment as soon as possible for a visit in 2 days Pregnancy            Discharge Medication List as of 2/1/2020 10:49 AM      START taking these medications    Details   doxylamine (UNISON) 25 MG tablet Take 1 tablet (25 mg total) by mouth daily at bedtime as needed for sleep for up to 7 days, Starting Sat 2/1/2020, Until Sat 2/8/2020, Print      pyridoxine (B-6) 25 MG tablet Take 1 tablet (25 mg total) by mouth daily for 7 days, Starting Sat 2/1/2020, Until Sat 2/8/2020, Print         CONTINUE these medications which have NOT CHANGED    Details   Prenatal Vit-Fe Fumarate-FA (PRENATAL VITAMIN) 28-0 8 mg Take by mouth, Historical Med           No discharge procedures on file      ED Provider  Electronically Signed by           Lemon Cogan, MD  02/01/20 26 [id number]

## 2020-03-01 ENCOUNTER — HOSPITAL ENCOUNTER (EMERGENCY)
Facility: HOSPITAL | Age: 30
Discharge: HOME | End: 2020-03-02
Attending: EMERGENCY MEDICINE | Admitting: EMERGENCY MEDICINE
Payer: COMMERCIAL

## 2020-03-01 DIAGNOSIS — R45.1 AGITATION: Primary | ICD-10-CM

## 2020-03-01 LAB
ALBUMIN SERPL BCP-MCNC: 3.5 G/DL (ref 3.5–5)
ALP SERPL-CCNC: 74 U/L (ref 46–116)
ALT SERPL W P-5'-P-CCNC: 19 U/L (ref 12–78)
ANION GAP SERPL CALCULATED.3IONS-SCNC: 8 MMOL/L (ref 4–13)
AST SERPL W P-5'-P-CCNC: 16 U/L (ref 5–45)
B-HCG SERPL-ACNC: ABNORMAL MIU/ML
BASOPHILS # BLD AUTO: 0.04 THOUSANDS/ΜL (ref 0–0.1)
BASOPHILS NFR BLD AUTO: 0 % (ref 0–1)
BILIRUB SERPL-MCNC: 0.4 MG/DL (ref 0.2–1)
BUN SERPL-MCNC: 3 MG/DL (ref 5–25)
CALCIUM SERPL-MCNC: 9 MG/DL (ref 8.3–10.1)
CHLORIDE SERPL-SCNC: 99 MMOL/L (ref 100–108)
CO2 SERPL-SCNC: 26 MMOL/L (ref 21–32)
CREAT SERPL-MCNC: 0.62 MG/DL (ref 0.6–1.3)
EOSINOPHIL # BLD AUTO: 0.12 THOUSAND/ΜL (ref 0–0.61)
EOSINOPHIL NFR BLD AUTO: 1 % (ref 0–6)
ERYTHROCYTE [DISTWIDTH] IN BLOOD BY AUTOMATED COUNT: 15.6 % (ref 11.6–15.1)
ETHANOL SERPL-MCNC: 4 MG/DL (ref 0–3)
GFR SERPL CREATININE-BSD FRML MDRD: 142 ML/MIN/1.73SQ M
GLUCOSE SERPL-MCNC: 99 MG/DL (ref 65–140)
HCT VFR BLD AUTO: 36.1 % (ref 34.8–46.1)
HGB BLD-MCNC: 11.6 G/DL (ref 11.5–15.4)
IMM GRANULOCYTES # BLD AUTO: 0.03 THOUSAND/UL (ref 0–0.2)
IMM GRANULOCYTES NFR BLD AUTO: 0 % (ref 0–2)
LYMPHOCYTES # BLD AUTO: 3.33 THOUSANDS/ΜL (ref 0.6–4.47)
LYMPHOCYTES NFR BLD AUTO: 30 % (ref 14–44)
MCH RBC QN AUTO: 28.5 PG (ref 26.8–34.3)
MCHC RBC AUTO-ENTMCNC: 32.1 G/DL (ref 31.4–37.4)
MCV RBC AUTO: 89 FL (ref 82–98)
MONOCYTES # BLD AUTO: 0.78 THOUSAND/ΜL (ref 0.17–1.22)
MONOCYTES NFR BLD AUTO: 7 % (ref 4–12)
NEUTROPHILS # BLD AUTO: 6.94 THOUSANDS/ΜL (ref 1.85–7.62)
NEUTS SEG NFR BLD AUTO: 62 % (ref 43–75)
NRBC BLD AUTO-RTO: 0 /100 WBCS
PLATELET # BLD AUTO: 389 THOUSANDS/UL (ref 149–390)
PMV BLD AUTO: 9.7 FL (ref 8.9–12.7)
POTASSIUM SERPL-SCNC: 3.1 MMOL/L (ref 3.5–5.3)
PROT SERPL-MCNC: 7.5 G/DL (ref 6.4–8.2)
RBC # BLD AUTO: 4.07 MILLION/UL (ref 3.81–5.12)
SODIUM SERPL-SCNC: 133 MMOL/L (ref 136–145)
WBC # BLD AUTO: 11.24 THOUSAND/UL (ref 4.31–10.16)

## 2020-03-01 PROCEDURE — 96372 THER/PROPH/DIAG INJ SC/IM: CPT

## 2020-03-01 PROCEDURE — 80320 DRUG SCREEN QUANTALCOHOLS: CPT | Performed by: EMERGENCY MEDICINE

## 2020-03-01 PROCEDURE — 84702 CHORIONIC GONADOTROPIN TEST: CPT | Performed by: EMERGENCY MEDICINE

## 2020-03-01 PROCEDURE — 80053 COMPREHEN METABOLIC PANEL: CPT | Performed by: EMERGENCY MEDICINE

## 2020-03-01 PROCEDURE — 99284 EMERGENCY DEPT VISIT MOD MDM: CPT

## 2020-03-01 PROCEDURE — 99282 EMERGENCY DEPT VISIT SF MDM: CPT | Performed by: EMERGENCY MEDICINE

## 2020-03-01 PROCEDURE — 85025 COMPLETE CBC W/AUTO DIFF WBC: CPT | Performed by: EMERGENCY MEDICINE

## 2020-03-01 PROCEDURE — 36415 COLL VENOUS BLD VENIPUNCTURE: CPT | Performed by: EMERGENCY MEDICINE

## 2020-03-01 RX ORDER — LORAZEPAM 2 MG/ML
2 INJECTION INTRAMUSCULAR ONCE
Status: COMPLETED | OUTPATIENT
Start: 2020-03-01 | End: 2020-03-01

## 2020-03-01 RX ORDER — OLANZAPINE 10 MG/1
10 INJECTION, POWDER, LYOPHILIZED, FOR SOLUTION INTRAMUSCULAR ONCE
Status: COMPLETED | OUTPATIENT
Start: 2020-03-01 | End: 2020-03-01

## 2020-03-01 RX ADMIN — LORAZEPAM 2 MG: 2 INJECTION INTRAMUSCULAR; INTRAVENOUS at 23:35

## 2020-03-01 RX ADMIN — OLANZAPINE 10 MG: 10 INJECTION, POWDER, FOR SOLUTION INTRAMUSCULAR at 23:35

## 2020-03-02 VITALS
HEART RATE: 82 BPM | WEIGHT: 174.16 LBS | BODY MASS INDEX: 31.85 KG/M2 | SYSTOLIC BLOOD PRESSURE: 100 MMHG | TEMPERATURE: 98.8 F | DIASTOLIC BLOOD PRESSURE: 70 MMHG | OXYGEN SATURATION: 96 % | RESPIRATION RATE: 16 BRPM

## 2020-03-02 LAB
AMPHETAMINES SERPL QL SCN: POSITIVE
BACTERIA UR QL AUTO: ABNORMAL /HPF
BARBITURATES UR QL: NEGATIVE
BENZODIAZ UR QL: NEGATIVE
BILIRUB UR QL STRIP: NEGATIVE
CLARITY UR: ABNORMAL
COCAINE UR QL: NEGATIVE
COLOR UR: ABNORMAL
GLUCOSE UR STRIP-MCNC: NEGATIVE MG/DL
HGB UR QL STRIP.AUTO: NEGATIVE
KETONES UR STRIP-MCNC: ABNORMAL MG/DL
LEUKOCYTE ESTERASE UR QL STRIP: ABNORMAL
METHADONE UR QL: NEGATIVE
NITRITE UR QL STRIP: POSITIVE
NON-SQ EPI CELLS URNS QL MICRO: ABNORMAL /HPF
OPIATES UR QL SCN: NEGATIVE
PCP UR QL: NEGATIVE
PH UR STRIP.AUTO: 6.5 [PH]
PROT UR STRIP-MCNC: NEGATIVE MG/DL
RBC #/AREA URNS AUTO: ABNORMAL /HPF
SP GR UR STRIP.AUTO: 1.01 (ref 1–1.03)
THC UR QL: POSITIVE
UROBILINOGEN UR QL STRIP.AUTO: 1 E.U./DL
WBC #/AREA URNS AUTO: ABNORMAL /HPF

## 2020-03-02 PROCEDURE — 81001 URINALYSIS AUTO W/SCOPE: CPT | Performed by: EMERGENCY MEDICINE

## 2020-03-02 PROCEDURE — 80307 DRUG TEST PRSMV CHEM ANLYZR: CPT | Performed by: EMERGENCY MEDICINE

## 2020-03-02 RX ORDER — GUAIFENESIN/DEXTROMETHORPHAN 100-10MG/5
10 SYRUP ORAL ONCE
Status: COMPLETED | OUTPATIENT
Start: 2020-03-02 | End: 2020-03-02

## 2020-03-02 RX ADMIN — GUAIFENESIN AND DEXTROMETHORPHAN 10 ML: 100; 10 SYRUP ORAL at 20:10

## 2020-03-02 NOTE — ED NOTES
Patient sleeping at this time   Remains on close observation     Katie Mason RN  03/02/20 0415 Elvira Cedillo RN  03/02/20 4420

## 2020-03-02 NOTE — ED NOTES
One bag of belongs: one pair pants, one bra, socks, hat, phone, one jacket,   Amber Wright in locker #20       Poonam Love RN  03/02/20 1334

## 2020-03-02 NOTE — ED NOTES
Pt changed into [facility] clothing  Pt urinated herself  Pt was uncooperative  Linens changed w/ security present        Terrell Sylvester RN  03/02/20 0886

## 2020-03-02 NOTE — ED CARE HANDOFF
Emergency Department Sign Out Note        Sign out and transfer of care from Dr Slime Brooke  See Separate Emergency Department note  The patient, Uli Ochoa, was evaluated by the previous provider for bizarre behavior/crisis  Workup Completed:  Patient had medical workup which is negative and has been evaluated by crisis  ED Course / Workup Pending (followup): Patient did not have any suicidal/homicidal ideation however did have some hallucinations  I will have crisis re-evaluate to see if patient needs to be place further for psychiatric illness  Procedures  MDM    Disposition  Final diagnoses:   None     ED Disposition     None      Follow-up Information    None       Patient's Medications   Discharge Prescriptions    No medications on file     No discharge procedures on file         ED Provider  Electronically Signed by     Dallin Dukes DO  03/02/20 1990

## 2020-03-02 NOTE — ED NOTES
Patient sitting in room on bed with blankets over head  Calm and cooperative,  Offering no complaints at this time  Dinner ordered, patient has phone @ bedside attempting to call family  will continue to monitor        [address] Umer Ambrocio, RN  03/02/20 Nelson Camacho RN  03/02/20 8818

## 2020-03-02 NOTE — ED NOTES
Patient sleeping, Lauren Son made aware of need for evaluation  Irais Gallagher RN  03/02/20 [address] [place] [address]  03/02/20 9498

## 2020-03-02 NOTE — ED NOTES
Patient sleeping in room  Respirations even and unlabored  Will continue to monitor        Sonya Camacho RN  03/02/20 6836

## 2020-03-02 NOTE — ED NOTES
Patient fell back to sleep  Respirations even and unlabored  Will continue to monitor        Marquez Preciado, RN  03/02/20 2625

## 2020-03-02 NOTE — ED NOTES
3/2/20 @ 0845: PES attempted to assess patient, but she was too lethargic to participate in evaluation; therefore, PES will attempt a little later this AM   ED RN notified  Jose Maria MS  1230: PES made another attempt to assess patient, but she was unable to participate  Patient did say, "I haven't slept for 5 days because I'm stressed out "  Also, patient said, "I want to go home " Patient was hard to understand because she was slurring her speech and falling asleep mid sentence  PES will continue to monitor  MS Jose Maria  1700: Patient is awake and stating that she wants to go home  Patient will be assessed next shift    Anil Dawkins MS

## 2020-03-02 NOTE — ED NOTES
Patient's spouse called for an update on patient's care  Per patient  does not want her medical information shared with anyone  She states that she will be getting a divorce from her spouse and does not want to talk to him  Charge nurse made aware and informed spouse on phone        202 Umer Ambrocio, RN  03/02/20 1404

## 2020-03-02 NOTE — ED NOTES
Patient requesting to use restroom  Informed patient she could be escorted to restroom and a urine sample was needed  Patient agreed to provide sample  Once in bathroom patient threw urine specimen collection container out of toilet and did not collect a sample  MD ronald Velázquez, RN  03/01/20 0238

## 2020-03-02 NOTE — ED NOTES
Patient belongings; 1 pair flip flops, 1 car key (both taken)    At bedside;  Cell phone, fleece coat, t shirt, green scrub pants, socks, hat

## 2020-03-02 NOTE — ED NOTES
Patient sleeping  Offering no complaints at this time  Will continue to monitor        Cynthia Bermudez RN  03/02/20 9494

## 2020-03-02 NOTE — ED NOTES
Patient resting comfortably in bed  Respirations easy and unlabored  Continual observation maintained for patient safety       Rocio Christian RN  03/02/20 8401

## 2020-03-02 NOTE — ED NOTES
Pt keeps asking to leave and approaching doorway of room, police remains with patient, pt placed on continual observation for elopement risk     Flower Alexander RN  03/02/20 0653

## 2020-03-02 NOTE — ED NOTES
Patient became very aggressive and argumentative with staff while walking into restroom after repeated attempts to deescalate behavior  Security called and patient escorted back to her room  Will continue to monitor        Jaycee Allen RN  03/02/20 7687

## 2020-03-02 NOTE — ED NOTES
This nurse attempted to assess fetal heart tones  Fetal heart tones heard but unable to count heart rate due to pt being uncooperative stating, "I'm not keeping it anyway  Just let me go home and be miserable  You're pushing on my bladder "  Pt instructed to collect urine sample but is refusing       Ihsan Sinclair RN  03/01/20 5496

## 2020-03-02 NOTE — ED NOTES
Patient wakes up from sleeping stating that she wants to go home  Crisis worker made aware to speak with patient @ bedside to give update on care        Tram Olson RN  03/02/20 1994

## 2020-03-02 NOTE — ED NOTES
Patient got up from bed and now sitting in side chair leaning on bed rail  Patient states again" I want to go home"  Will continue to monitor        202 Umer Ambrocio, RN  03/02/20 5636
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
## 2020-03-03 NOTE — ED NOTES
Patient now sitting up on side of bed asking when she will be screened  Informed patient that as soon as that information is obtained, will be provided to her  Patient appears restless in room, pacing and throwing linens  Patient adamant about leaving and going home to go to work tomorrow  Planning on staying with a friend  Will continue to monitor        [address] Ambrocio, RN  03/02/20 2102       Maxine Camacho RN  03/02/20 2108

## 2020-03-03 NOTE — ED NOTES
Crisis spoke to patient and pt verbalizes wanting to go home    Pt to be discharged per JACK Contreras Daily, RN  03/02/20 5707

## 2020-03-03 NOTE — ED NOTES
Patient laying down in room  Started coughing  Patient requesting medication  MD made aware   Will continue to monitor     Wilmar Ford RN  03/02/20 2010

## 2020-03-03 NOTE — ED NOTES
Pt's  just called and pt does not want to speak with him   informed patient is not taking calls at this time    Pt sitting up in stretcher,calm and cooperative     Darnella Severe, AMANDA  03/02/20 4023

## 2020-03-20 ENCOUNTER — TELEPHONE (OUTPATIENT)
Dept: FAMILY MEDICINE CLINIC | Facility: CLINIC | Age: 30
End: 2020-03-20

## 2020-03-26 ENCOUNTER — TELEPHONE (OUTPATIENT)
Dept: FAMILY MEDICINE CLINIC | Facility: CLINIC | Age: 30
End: 2020-03-26

## 2020-03-26 DIAGNOSIS — O26.899 SHORTNESS OF BREATH DUE TO PREGNANCY: Primary | ICD-10-CM

## 2020-03-26 DIAGNOSIS — R06.02 SHORTNESS OF BREATH DUE TO PREGNANCY: Primary | ICD-10-CM

## 2020-03-26 RX ORDER — ALBUTEROL SULFATE 90 UG/1
2 AEROSOL, METERED RESPIRATORY (INHALATION) EVERY 6 HOURS PRN
Qty: 1 INHALER | Refills: 5 | Status: SHIPPED | OUTPATIENT
Start: 2020-03-26 | End: 2020-07-14 | Stop reason: SDUPTHER

## 2020-03-26 NOTE — TELEPHONE ENCOUNTER
Pt is having anxiety attack, a few weeks pregnant, usually uses inhaler for anxiety attack, feels like she cant breathe - does not like anxiety medication - only inhaler    No wheeze  Pt states she never has been to ER for this issue in past    Call sent to triage - Leydi Aguilar

## 2020-03-26 NOTE — TELEPHONE ENCOUNTER
LMOM advising patient about the inhaler and advised her if in 1-2 hrs, no relief c/b for a virtual visit    Also let her know the inhaler is at the pharmacy ready for P/U

## 2020-03-26 NOTE — TELEPHONE ENCOUNTER
Tried to call patient twice and left message  Pt does not have history of asthma but stated to nurse that her inhaler makes her feel better  I sent albuterol inhaler to pharmacy  Freya Mack was able to get hold of patient and relayed information  If patient not better by afternoon, should be seen via virtual visit

## 2020-03-26 NOTE — TELEPHONE ENCOUNTER
Please call in inhaler ASAP as patient feels shes having an asthma/panic attack, this has happened to her before and an inhaler seems to help but she is out of her inhaler

## 2020-03-27 ENCOUNTER — TELEPHONE (OUTPATIENT)
Dept: FAMILY MEDICINE CLINIC | Facility: CLINIC | Age: 30
End: 2020-03-27

## 2020-04-08 ENCOUNTER — HOSPITAL ENCOUNTER (EMERGENCY)
Facility: HOSPITAL | Age: 30
End: 2020-04-10
Attending: EMERGENCY MEDICINE | Admitting: EMERGENCY MEDICINE
Payer: COMMERCIAL

## 2020-04-08 ENCOUNTER — APPOINTMENT (EMERGENCY)
Dept: RADIOLOGY | Facility: HOSPITAL | Age: 30
End: 2020-04-08
Payer: COMMERCIAL

## 2020-04-08 DIAGNOSIS — T14.91XA SUICIDE ATTEMPT (HCC): Primary | ICD-10-CM

## 2020-04-08 DIAGNOSIS — T50.901A OVERDOSE: ICD-10-CM

## 2020-04-08 LAB
ALBUMIN SERPL BCP-MCNC: 3 G/DL (ref 3.5–5)
ALBUMIN SERPL BCP-MCNC: 3.1 G/DL (ref 3.5–5)
ALP SERPL-CCNC: 63 U/L (ref 46–116)
ALP SERPL-CCNC: 67 U/L (ref 46–116)
ALT SERPL W P-5'-P-CCNC: 19 U/L (ref 12–78)
ALT SERPL W P-5'-P-CCNC: 22 U/L (ref 12–78)
AMPHETAMINES SERPL QL SCN: POSITIVE
ANION GAP SERPL CALCULATED.3IONS-SCNC: 12 MMOL/L (ref 4–13)
ANION GAP SERPL CALCULATED.3IONS-SCNC: 12 MMOL/L (ref 4–13)
APAP SERPL-MCNC: <2 UG/ML (ref 10–20)
APTT PPP: 27 SECONDS (ref 23–37)
AST SERPL W P-5'-P-CCNC: 21 U/L (ref 5–45)
AST SERPL W P-5'-P-CCNC: 21 U/L (ref 5–45)
B-HCG SERPL-ACNC: ABNORMAL MIU/ML
BACTERIA UR QL AUTO: ABNORMAL /HPF
BARBITURATES UR QL: NEGATIVE
BASOPHILS # BLD AUTO: 0.04 THOUSANDS/ΜL (ref 0–0.1)
BASOPHILS NFR BLD AUTO: 0 % (ref 0–1)
BENZODIAZ UR QL: NEGATIVE
BILIRUB SERPL-MCNC: 0.6 MG/DL (ref 0.2–1)
BILIRUB SERPL-MCNC: 0.6 MG/DL (ref 0.2–1)
BILIRUB UR QL STRIP: ABNORMAL
BUN SERPL-MCNC: 5 MG/DL (ref 5–25)
BUN SERPL-MCNC: 5 MG/DL (ref 5–25)
CALCIUM SERPL-MCNC: 7.9 MG/DL (ref 8.3–10.1)
CALCIUM SERPL-MCNC: 8.5 MG/DL (ref 8.3–10.1)
CHLORIDE SERPL-SCNC: 103 MMOL/L (ref 100–108)
CHLORIDE SERPL-SCNC: 103 MMOL/L (ref 100–108)
CLARITY UR: ABNORMAL
CO2 SERPL-SCNC: 22 MMOL/L (ref 21–32)
CO2 SERPL-SCNC: 22 MMOL/L (ref 21–32)
COCAINE UR QL: NEGATIVE
COLOR UR: YELLOW
CREAT SERPL-MCNC: 0.68 MG/DL (ref 0.6–1.3)
CREAT SERPL-MCNC: 0.74 MG/DL (ref 0.6–1.3)
EOSINOPHIL # BLD AUTO: 0.09 THOUSAND/ΜL (ref 0–0.61)
EOSINOPHIL NFR BLD AUTO: 1 % (ref 0–6)
ERYTHROCYTE [DISTWIDTH] IN BLOOD BY AUTOMATED COUNT: 15.2 % (ref 11.6–15.1)
ETHANOL SERPL-MCNC: <3 MG/DL (ref 0–3)
EXT PREG TEST URINE: POSITIVE
EXT. CONTROL ED NAV: ABNORMAL
GFR SERPL CREATININE-BSD FRML MDRD: 127 ML/MIN/1.73SQ M
GFR SERPL CREATININE-BSD FRML MDRD: 138 ML/MIN/1.73SQ M
GLUCOSE SERPL-MCNC: 72 MG/DL (ref 65–140)
GLUCOSE SERPL-MCNC: 81 MG/DL (ref 65–140)
GLUCOSE UR STRIP-MCNC: NEGATIVE MG/DL
HCT VFR BLD AUTO: 29.6 % (ref 34.8–46.1)
HGB BLD-MCNC: 9.5 G/DL (ref 11.5–15.4)
HGB UR QL STRIP.AUTO: NEGATIVE
IMM GRANULOCYTES # BLD AUTO: 0.03 THOUSAND/UL (ref 0–0.2)
IMM GRANULOCYTES NFR BLD AUTO: 0 % (ref 0–2)
INR PPP: 0.97 (ref 0.84–1.19)
KETONES UR STRIP-MCNC: ABNORMAL MG/DL
LEUKOCYTE ESTERASE UR QL STRIP: ABNORMAL
LYMPHOCYTES # BLD AUTO: 2.78 THOUSANDS/ΜL (ref 0.6–4.47)
LYMPHOCYTES NFR BLD AUTO: 29 % (ref 14–44)
MAGNESIUM SERPL-MCNC: 1.7 MG/DL (ref 1.6–2.6)
MCH RBC QN AUTO: 28.7 PG (ref 26.8–34.3)
MCHC RBC AUTO-ENTMCNC: 32.1 G/DL (ref 31.4–37.4)
MCV RBC AUTO: 89 FL (ref 82–98)
METHADONE UR QL: NEGATIVE
MONOCYTES # BLD AUTO: 0.72 THOUSAND/ΜL (ref 0.17–1.22)
MONOCYTES NFR BLD AUTO: 8 % (ref 4–12)
NEUTROPHILS # BLD AUTO: 5.8 THOUSANDS/ΜL (ref 1.85–7.62)
NEUTS SEG NFR BLD AUTO: 62 % (ref 43–75)
NITRITE UR QL STRIP: POSITIVE
NON-SQ EPI CELLS URNS QL MICRO: ABNORMAL /HPF
NRBC BLD AUTO-RTO: 0 /100 WBCS
OPIATES UR QL SCN: NEGATIVE
PCP UR QL: NEGATIVE
PH UR STRIP.AUTO: 6.5 [PH]
PLATELET # BLD AUTO: 304 THOUSANDS/UL (ref 149–390)
PMV BLD AUTO: 9.4 FL (ref 8.9–12.7)
POTASSIUM SERPL-SCNC: 2.8 MMOL/L (ref 3.5–5.3)
POTASSIUM SERPL-SCNC: 3.2 MMOL/L (ref 3.5–5.3)
PROT SERPL-MCNC: 5.9 G/DL (ref 6.4–8.2)
PROT SERPL-MCNC: 6.5 G/DL (ref 6.4–8.2)
PROT UR STRIP-MCNC: ABNORMAL MG/DL
PROTHROMBIN TIME: 13.1 SECONDS (ref 11.6–14.5)
RBC # BLD AUTO: 3.31 MILLION/UL (ref 3.81–5.12)
RBC #/AREA URNS AUTO: ABNORMAL /HPF
SALICYLATES SERPL-MCNC: 3 MG/DL (ref 3–20)
SALICYLATES SERPL-MCNC: 3.5 MG/DL (ref 3–20)
SODIUM SERPL-SCNC: 137 MMOL/L (ref 136–145)
SODIUM SERPL-SCNC: 137 MMOL/L (ref 136–145)
SP GR UR STRIP.AUTO: 1.02 (ref 1–1.03)
THC UR QL: POSITIVE
TROPONIN I SERPL-MCNC: <0.02 NG/ML
UROBILINOGEN UR QL STRIP.AUTO: 1 E.U./DL
WBC # BLD AUTO: 9.46 THOUSAND/UL (ref 4.31–10.16)
WBC #/AREA URNS AUTO: ABNORMAL /HPF

## 2020-04-08 PROCEDURE — 85730 THROMBOPLASTIN TIME PARTIAL: CPT | Performed by: EMERGENCY MEDICINE

## 2020-04-08 PROCEDURE — 99448 NTRPROF PH1/NTRNET/EHR 21-30: CPT | Performed by: EMERGENCY MEDICINE

## 2020-04-08 PROCEDURE — 81001 URINALYSIS AUTO W/SCOPE: CPT | Performed by: EMERGENCY MEDICINE

## 2020-04-08 PROCEDURE — C9113 INJ PANTOPRAZOLE SODIUM, VIA: HCPCS | Performed by: EMERGENCY MEDICINE

## 2020-04-08 PROCEDURE — 99285 EMERGENCY DEPT VISIT HI MDM: CPT

## 2020-04-08 PROCEDURE — 80307 DRUG TEST PRSMV CHEM ANLYZR: CPT | Performed by: EMERGENCY MEDICINE

## 2020-04-08 PROCEDURE — 85025 COMPLETE CBC W/AUTO DIFF WBC: CPT | Performed by: EMERGENCY MEDICINE

## 2020-04-08 PROCEDURE — 80329 ANALGESICS NON-OPIOID 1 OR 2: CPT | Performed by: EMERGENCY MEDICINE

## 2020-04-08 PROCEDURE — 96361 HYDRATE IV INFUSION ADD-ON: CPT

## 2020-04-08 PROCEDURE — 96374 THER/PROPH/DIAG INJ IV PUSH: CPT

## 2020-04-08 PROCEDURE — 85610 PROTHROMBIN TIME: CPT | Performed by: EMERGENCY MEDICINE

## 2020-04-08 PROCEDURE — 87086 URINE CULTURE/COLONY COUNT: CPT | Performed by: EMERGENCY MEDICINE

## 2020-04-08 PROCEDURE — 80320 DRUG SCREEN QUANTALCOHOLS: CPT | Performed by: EMERGENCY MEDICINE

## 2020-04-08 PROCEDURE — 81025 URINE PREGNANCY TEST: CPT | Performed by: EMERGENCY MEDICINE

## 2020-04-08 PROCEDURE — 99285 EMERGENCY DEPT VISIT HI MDM: CPT | Performed by: EMERGENCY MEDICINE

## 2020-04-08 PROCEDURE — 84702 CHORIONIC GONADOTROPIN TEST: CPT | Performed by: EMERGENCY MEDICINE

## 2020-04-08 PROCEDURE — 83735 ASSAY OF MAGNESIUM: CPT | Performed by: EMERGENCY MEDICINE

## 2020-04-08 PROCEDURE — 80053 COMPREHEN METABOLIC PANEL: CPT | Performed by: EMERGENCY MEDICINE

## 2020-04-08 PROCEDURE — 93005 ELECTROCARDIOGRAM TRACING: CPT

## 2020-04-08 PROCEDURE — 36415 COLL VENOUS BLD VENIPUNCTURE: CPT | Performed by: EMERGENCY MEDICINE

## 2020-04-08 PROCEDURE — 84484 ASSAY OF TROPONIN QUANT: CPT | Performed by: EMERGENCY MEDICINE

## 2020-04-08 RX ORDER — DIPHENHYDRAMINE HYDROCHLORIDE 50 MG/ML
50 INJECTION INTRAMUSCULAR; INTRAVENOUS ONCE
Status: DISCONTINUED | OUTPATIENT
Start: 2020-04-08 | End: 2020-04-08

## 2020-04-08 RX ORDER — DIPHENHYDRAMINE HYDROCHLORIDE 50 MG/ML
50 INJECTION INTRAMUSCULAR; INTRAVENOUS ONCE
Status: DISCONTINUED | OUTPATIENT
Start: 2020-04-08 | End: 2020-04-10 | Stop reason: HOSPADM

## 2020-04-08 RX ORDER — POTASSIUM CHLORIDE 20 MEQ/1
40 TABLET, EXTENDED RELEASE ORAL ONCE
Status: COMPLETED | OUTPATIENT
Start: 2020-04-08 | End: 2020-04-08

## 2020-04-08 RX ORDER — POISON ADSORBENT 50 G/240ML
25 SUSPENSION ORAL ONCE
Status: COMPLETED | OUTPATIENT
Start: 2020-04-08 | End: 2020-04-08

## 2020-04-08 RX ORDER — PANTOPRAZOLE SODIUM 40 MG/1
40 INJECTION, POWDER, FOR SOLUTION INTRAVENOUS ONCE
Status: COMPLETED | OUTPATIENT
Start: 2020-04-08 | End: 2020-04-08

## 2020-04-08 RX ADMIN — POTASSIUM CHLORIDE 40 MEQ: 1500 TABLET, EXTENDED RELEASE ORAL at 17:53

## 2020-04-08 RX ADMIN — ACTIVATED CHARCOAL 25 G: 208 SUSPENSION ORAL at 15:40

## 2020-04-08 RX ADMIN — PANTOPRAZOLE SODIUM 40 MG: 40 INJECTION, POWDER, FOR SOLUTION INTRAVENOUS at 15:39

## 2020-04-08 RX ADMIN — SODIUM CHLORIDE 1000 ML: 0.9 INJECTION, SOLUTION INTRAVENOUS at 15:08

## 2020-04-09 VITALS
DIASTOLIC BLOOD PRESSURE: 55 MMHG | OXYGEN SATURATION: 96 % | TEMPERATURE: 98.3 F | WEIGHT: 168.21 LBS | SYSTOLIC BLOOD PRESSURE: 100 MMHG | RESPIRATION RATE: 17 BRPM | HEART RATE: 83 BPM | BODY MASS INDEX: 30.77 KG/M2

## 2020-04-09 LAB
ALBUMIN SERPL BCP-MCNC: 2.5 G/DL (ref 3.5–5)
ALP SERPL-CCNC: 60 U/L (ref 46–116)
ALT SERPL W P-5'-P-CCNC: 18 U/L (ref 12–78)
ANION GAP SERPL CALCULATED.3IONS-SCNC: 10 MMOL/L (ref 4–13)
AST SERPL W P-5'-P-CCNC: 18 U/L (ref 5–45)
BACTERIA UR CULT: NORMAL
BILIRUB SERPL-MCNC: 0.3 MG/DL (ref 0.2–1)
BUN SERPL-MCNC: 4 MG/DL (ref 5–25)
CALCIUM SERPL-MCNC: 8.2 MG/DL (ref 8.3–10.1)
CHLORIDE SERPL-SCNC: 106 MMOL/L (ref 100–108)
CO2 SERPL-SCNC: 22 MMOL/L (ref 21–32)
CREAT SERPL-MCNC: 0.73 MG/DL (ref 0.6–1.3)
GFR SERPL CREATININE-BSD FRML MDRD: 130 ML/MIN/1.73SQ M
GLUCOSE SERPL-MCNC: 98 MG/DL (ref 65–140)
POTASSIUM SERPL-SCNC: 3.4 MMOL/L (ref 3.5–5.3)
PROT SERPL-MCNC: 5.8 G/DL (ref 6.4–8.2)
SODIUM SERPL-SCNC: 138 MMOL/L (ref 136–145)

## 2020-04-09 PROCEDURE — 80053 COMPREHEN METABOLIC PANEL: CPT | Performed by: EMERGENCY MEDICINE

## 2020-04-09 PROCEDURE — 36415 COLL VENOUS BLD VENIPUNCTURE: CPT | Performed by: EMERGENCY MEDICINE

## 2020-04-09 RX ORDER — CEPHALEXIN 500 MG/1
500 CAPSULE ORAL EVERY 6 HOURS SCHEDULED
Status: DISCONTINUED | OUTPATIENT
Start: 2020-04-09 | End: 2020-04-09

## 2020-04-09 RX ORDER — OLANZAPINE 10 MG/1
5 INJECTION, POWDER, LYOPHILIZED, FOR SOLUTION INTRAMUSCULAR EVERY 8 HOURS PRN
Status: DISCONTINUED | OUTPATIENT
Start: 2020-04-09 | End: 2020-04-10 | Stop reason: HOSPADM

## 2020-04-09 RX ORDER — LORAZEPAM 2 MG/ML
1 INJECTION INTRAMUSCULAR EVERY 4 HOURS PRN
Status: DISCONTINUED | OUTPATIENT
Start: 2020-04-09 | End: 2020-04-10 | Stop reason: HOSPADM

## 2020-04-09 RX ORDER — CEPHALEXIN 500 MG/1
500 CAPSULE ORAL EVERY 6 HOURS SCHEDULED
Status: DISCONTINUED | OUTPATIENT
Start: 2020-04-09 | End: 2020-04-10 | Stop reason: HOSPADM

## 2020-04-09 RX ADMIN — CEPHALEXIN 500 MG: 500 CAPSULE ORAL at 18:34

## 2020-04-16 ENCOUNTER — TELEPHONE (OUTPATIENT)
Dept: FAMILY MEDICINE CLINIC | Facility: CLINIC | Age: 30
End: 2020-04-16

## 2020-04-21 ENCOUNTER — INITIAL PRENATAL (OUTPATIENT)
Dept: FAMILY MEDICINE CLINIC | Facility: CLINIC | Age: 30
End: 2020-04-21
Payer: COMMERCIAL

## 2020-04-21 ENCOUNTER — PATIENT OUTREACH (OUTPATIENT)
Dept: FAMILY MEDICINE CLINIC | Facility: CLINIC | Age: 30
End: 2020-04-21

## 2020-04-21 VITALS
WEIGHT: 167 LBS | HEART RATE: 78 BPM | DIASTOLIC BLOOD PRESSURE: 68 MMHG | SYSTOLIC BLOOD PRESSURE: 112 MMHG | HEIGHT: 62 IN | RESPIRATION RATE: 18 BRPM | BODY MASS INDEX: 30.73 KG/M2

## 2020-04-21 DIAGNOSIS — Z3A.21 21 WEEKS GESTATION OF PREGNANCY: Primary | ICD-10-CM

## 2020-04-21 PROCEDURE — 59426 ANTEPARTUM CARE ONLY: CPT

## 2020-04-22 ENCOUNTER — ROUTINE PRENATAL (OUTPATIENT)
Dept: PERINATAL CARE | Facility: OTHER | Age: 30
End: 2020-04-22
Payer: COMMERCIAL

## 2020-04-22 VITALS
SYSTOLIC BLOOD PRESSURE: 111 MMHG | HEART RATE: 92 BPM | WEIGHT: 172.8 LBS | BODY MASS INDEX: 31.61 KG/M2 | DIASTOLIC BLOOD PRESSURE: 71 MMHG | TEMPERATURE: 96.9 F

## 2020-04-22 DIAGNOSIS — O99.210 OBESITY AFFECTING PREGNANCY, ANTEPARTUM: Primary | ICD-10-CM

## 2020-04-22 DIAGNOSIS — Z3A.21 21 WEEKS GESTATION OF PREGNANCY: ICD-10-CM

## 2020-04-22 PROBLEM — O02.1 MISSED ABORTION: Status: RESOLVED | Noted: 2019-07-09 | Resolved: 2020-04-22

## 2020-04-22 PROBLEM — O02.1 MISSED ABORTION WITH FETAL DEMISE BEFORE 20 COMPLETED WEEKS OF GESTATION: Status: RESOLVED | Noted: 2019-07-08 | Resolved: 2020-04-22

## 2020-04-22 PROCEDURE — 76811 OB US DETAILED SNGL FETUS: CPT | Performed by: OBSTETRICS & GYNECOLOGY

## 2020-04-22 PROCEDURE — 4004F PT TOBACCO SCREEN RCVD TLK: CPT | Performed by: OBSTETRICS & GYNECOLOGY

## 2020-04-22 PROCEDURE — 99213 OFFICE O/P EST LOW 20 MIN: CPT | Performed by: OBSTETRICS & GYNECOLOGY

## 2020-04-23 ENCOUNTER — ROUTINE PRENATAL (OUTPATIENT)
Dept: FAMILY MEDICINE CLINIC | Facility: CLINIC | Age: 30
End: 2020-04-23
Payer: COMMERCIAL

## 2020-04-23 VITALS — WEIGHT: 169.3 LBS | BODY MASS INDEX: 30.97 KG/M2 | DIASTOLIC BLOOD PRESSURE: 56 MMHG | SYSTOLIC BLOOD PRESSURE: 108 MMHG

## 2020-04-23 DIAGNOSIS — Z3A.22 22 WEEKS GESTATION OF PREGNANCY: Primary | ICD-10-CM

## 2020-04-23 DIAGNOSIS — Z02.83 ENCOUNTER FOR DRUG SCREENING: ICD-10-CM

## 2020-04-23 DIAGNOSIS — Z00.00 HEALTHCARE MAINTENANCE: ICD-10-CM

## 2020-04-23 LAB
SL AMB  POCT GLUCOSE, UA: NORMAL
SL AMB POCT CLARITY,UA: CLEAR
SL AMB POCT COLOR,UA: YELLOW
SL AMB POCT URINE PROTEIN: NORMAL

## 2020-04-23 PROCEDURE — 81002 URINALYSIS NONAUTO W/O SCOPE: CPT | Performed by: FAMILY MEDICINE

## 2020-04-23 PROCEDURE — 0500F INITIAL PRENATAL CARE VISIT: CPT | Performed by: FAMILY MEDICINE

## 2020-04-23 RX ORDER — PNV NO.95/FERROUS FUM/FOLIC AC 28MG-0.8MG
1 TABLET ORAL DAILY
Qty: 90 TABLET | Refills: 1 | Status: SHIPPED | OUTPATIENT
Start: 2020-04-23 | End: 2020-05-21

## 2020-04-24 LAB
CYTOLOGIST CVX/VAG CYTO: NORMAL
DX ICD CODE: NORMAL
OTHER STN SPEC: NORMAL
OTHER STN SPEC: NORMAL
PATH REPORT.FINAL DX SPEC: NORMAL
SL AMB NOTE:: NORMAL
SL AMB SPECIMEN ADEQUACY: NORMAL
SL AMB TEST METHODOLOGY: NORMAL

## 2020-04-27 LAB
AMPHETAMINES UR QL SCN: NEGATIVE
BACTERIA GENITAL AEROBE CULT: ABNORMAL
BARBITURATES UR QL SCN: NEGATIVE NG/ML
BENZODIAZ UR QL: NEGATIVE NG/ML
BZE UR QL: NEGATIVE NG/ML
C TRACH RRNA SPEC QL NAA+PROBE: NEGATIVE
CANNABINOIDS UR QL SCN: POSITIVE
Lab: ABNORMAL
Lab: ABNORMAL
METHADONE UR QL SCN: NEGATIVE NG/ML
N GONORRHOEA RRNA SPEC QL NAA+PROBE: NEGATIVE
OPIATES UR QL: NEGATIVE NG/ML
PCP UR QL: NEGATIVE NG/ML
PROPOXYPH UR QL SCN: NEGATIVE NG/ML

## 2020-04-28 ENCOUNTER — TELEPHONE (OUTPATIENT)
Dept: FAMILY MEDICINE CLINIC | Facility: CLINIC | Age: 30
End: 2020-04-28

## 2020-04-28 DIAGNOSIS — B96.89 BACTERIAL VAGINOSIS IN PREGNANCY: Primary | ICD-10-CM

## 2020-04-28 DIAGNOSIS — O23.599 BACTERIAL VAGINOSIS IN PREGNANCY: Primary | ICD-10-CM

## 2020-04-28 RX ORDER — METRONIDAZOLE 250 MG/1
250 TABLET ORAL EVERY 8 HOURS SCHEDULED
Qty: 21 TABLET | Refills: 0 | Status: SHIPPED | OUTPATIENT
Start: 2020-04-28 | End: 2020-05-05

## 2020-04-29 LAB
ATRIAL RATE: 109 BPM
HAV IGM SERPL QL IA: NEGATIVE
HBV CORE IGM SERPL QL IA: NEGATIVE
HBV SURFACE AG SERPL QL IA: NEGATIVE
HCV AB S/CO SERPL IA: <0.1 S/CO RATIO (ref 0–0.9)
P AXIS: 57 DEGREES
PR INTERVAL: 134 MS
QRS AXIS: 61 DEGREES
QRSD INTERVAL: 80 MS
QT INTERVAL: 322 MS
QTC INTERVAL: 433 MS
T WAVE AXIS: -9 DEGREES
VENTRICULAR RATE: 109 BPM

## 2020-04-29 PROCEDURE — 93010 ELECTROCARDIOGRAM REPORT: CPT | Performed by: INTERNAL MEDICINE

## 2020-05-04 LAB
ABO GROUP BLD: ABNORMAL
AMPHETAMINES UR QL SCN: NEGATIVE NG/ML
APPEARANCE UR: ABNORMAL
BARBITURATES UR QL SCN: NEGATIVE NG/ML
BASOPHILS # BLD AUTO: 0 X10E3/UL (ref 0–0.2)
BASOPHILS NFR BLD AUTO: 0 %
BENZODIAZ UR QL: NEGATIVE NG/ML
BILIRUB UR QL STRIP: NEGATIVE
BLD GP AB SCN SERPL QL: NEGATIVE
BZE UR QL: NEGATIVE NG/ML
CANNABINOIDS UR QL SCN: POSITIVE
CF COMMENT: NORMAL
CFTR MUT ANL BLD/T: NORMAL
COLOR UR: YELLOW
DEPRECATED HGB OTHER BLD-IMP: 0 %
EOSINOPHIL # BLD AUTO: 0.1 X10E3/UL (ref 0–0.4)
EOSINOPHIL NFR BLD AUTO: 1 %
ERYTHROCYTE [DISTWIDTH] IN BLOOD BY AUTOMATED COUNT: 15.2 % (ref 11.7–15.4)
GLUCOSE UR QL: NEGATIVE
HBV SURFACE AG SERPL QL IA: NEGATIVE
HCT VFR BLD AUTO: 28.5 % (ref 34–46.6)
HGB A MFR BLD: 1.7 % (ref 1.8–3.2)
HGB A MFR BLD: 98.3 % (ref 96.4–98.8)
HGB BLD-MCNC: 9.2 G/DL (ref 11.1–15.9)
HGB C MFR BLD: 0 %
HGB F MFR BLD: 0 % (ref 0–2)
HGB FRACT BLD-IMP: ABNORMAL
HGB S MFR BLD: 0 %
HGB UR QL STRIP: NEGATIVE
IMM GRANULOCYTES # BLD: 0.1 X10E3/UL (ref 0–0.1)
IMM GRANULOCYTES NFR BLD: 1 %
KETONES UR QL STRIP: NEGATIVE
LEUKOCYTE ESTERASE UR QL STRIP: NEGATIVE
LYMPHOCYTES # BLD AUTO: 2.2 X10E3/UL (ref 0.7–3.1)
LYMPHOCYTES NFR BLD AUTO: 26 %
MCH RBC QN AUTO: 27.8 PG (ref 26.6–33)
MCHC RBC AUTO-ENTMCNC: 32.3 G/DL (ref 31.5–35.7)
MCV RBC AUTO: 86 FL (ref 79–97)
METHADONE UR QL SCN: NEGATIVE NG/ML
MICRO URNS: ABNORMAL
MONOCYTES # BLD AUTO: 0.6 X10E3/UL (ref 0.1–0.9)
MONOCYTES NFR BLD AUTO: 7 %
NEUTROPHILS # BLD AUTO: 5.7 X10E3/UL (ref 1.4–7)
NEUTROPHILS NFR BLD AUTO: 65 %
NITRITE UR QL STRIP: NEGATIVE
OPIATES UR QL: NEGATIVE NG/ML
PCP UR QL: NEGATIVE NG/ML
PH UR STRIP: 7 [PH] (ref 5–7.5)
PLATELET # BLD AUTO: 304 X10E3/UL (ref 150–450)
PROPOXYPH UR QL SCN: NEGATIVE NG/ML
PROT UR QL STRIP: NEGATIVE
RBC # BLD AUTO: 3.31 X10E6/UL (ref 3.77–5.28)
RH BLD: POSITIVE
RPR SER QL: NON REACTIVE
RUBV IGG SERPL IA-ACNC: 5.54 INDEX
SP GR UR: 1.02 (ref 1–1.03)
TSH SERPL DL<=0.005 MIU/L-ACNC: 1.05 UIU/ML (ref 0.45–4.5)
UROBILINOGEN UR STRIP-ACNC: 1 MG/DL (ref 0.2–1)
WBC # BLD AUTO: 8.6 X10E3/UL (ref 3.4–10.8)

## 2020-05-21 ENCOUNTER — ROUTINE PRENATAL (OUTPATIENT)
Dept: FAMILY MEDICINE CLINIC | Facility: CLINIC | Age: 30
End: 2020-05-21

## 2020-05-21 VITALS
WEIGHT: 178 LBS | DIASTOLIC BLOOD PRESSURE: 70 MMHG | TEMPERATURE: 98.2 F | BODY MASS INDEX: 32.76 KG/M2 | RESPIRATION RATE: 18 BRPM | OXYGEN SATURATION: 100 % | HEART RATE: 112 BPM | SYSTOLIC BLOOD PRESSURE: 108 MMHG | HEIGHT: 62 IN

## 2020-05-21 DIAGNOSIS — Z3A.26 26 WEEKS GESTATION OF PREGNANCY: Primary | ICD-10-CM

## 2020-05-21 DIAGNOSIS — O99.012 ANEMIA IN PREGNANCY, SECOND TRIMESTER: ICD-10-CM

## 2020-05-21 LAB
SL AMB  POCT GLUCOSE, UA: NORMAL
SL AMB POCT URINE PROTEIN: NORMAL

## 2020-05-21 PROCEDURE — NC001 PR NO CHARGE: Performed by: OBSTETRICS & GYNECOLOGY

## 2020-05-21 RX ORDER — FERROUS SULFATE TAB EC 324 MG (65 MG FE EQUIVALENT) 324 (65 FE) MG
TABLET DELAYED RESPONSE ORAL
Qty: 90 TABLET | Refills: 1 | Status: SHIPPED | OUTPATIENT
Start: 2020-05-21

## 2020-05-21 RX ORDER — .ALPHA.-TOCOPHEROL ACETATE, DL-, ASCORBIC ACID, CHOLECALCIFEROL, CYANOCOBALAMIN, FOLIC ACID, FERROUS FUMARATE, CALCIUM PHOSPHATE, DIBASIC, ANHYDROUS, NIACINAMIDE, PYRIDOXINE HYDROCHLORIDE, RIBOFLAVIN, THIAMINE MONONITRATE, AND VITAMIN A ACETATE 15; 60; 400; 4.5; 1; 27; 50; 13.5; 1.05; 1.2; 1.05; 25 [IU]/1; MG/1; [IU]/1; UG/1; MG/1; MG/1; MG/1; MG/1; MG/1; MG/1; MG/1; [IU]/1
1 TABLET ORAL DAILY
Qty: 90 TABLET | Refills: 1 | Status: SHIPPED | OUTPATIENT
Start: 2020-05-21

## 2020-05-22 ENCOUNTER — TELEPHONE (OUTPATIENT)
Dept: FAMILY MEDICINE CLINIC | Facility: CLINIC | Age: 30
End: 2020-05-22

## 2020-05-26 LAB
AMPHETAMINES UR QL SCN: NEGATIVE NG/ML
BARBITURATES UR QL SCN: NEGATIVE NG/ML
BENZODIAZ UR QL: NEGATIVE NG/ML
BZE UR QL: NEGATIVE NG/ML
CANNABINOIDS UR QL SCN: POSITIVE
METHADONE UR QL SCN: NEGATIVE NG/ML
OPIATES UR QL: NEGATIVE NG/ML
PCP UR QL: NEGATIVE NG/ML
PROPOXYPH UR QL SCN: NEGATIVE NG/ML

## 2020-06-16 ENCOUNTER — TELEPHONE (OUTPATIENT)
Dept: FAMILY MEDICINE CLINIC | Facility: CLINIC | Age: 30
End: 2020-06-16

## 2020-06-17 ENCOUNTER — TELEPHONE (OUTPATIENT)
Dept: PERINATAL CARE | Facility: CLINIC | Age: 30
End: 2020-06-17

## 2020-06-18 ENCOUNTER — ULTRASOUND (OUTPATIENT)
Dept: PERINATAL CARE | Facility: OTHER | Age: 30
End: 2020-06-18
Payer: COMMERCIAL

## 2020-06-18 VITALS
SYSTOLIC BLOOD PRESSURE: 123 MMHG | WEIGHT: 177.6 LBS | HEART RATE: 100 BPM | BODY MASS INDEX: 32.68 KG/M2 | TEMPERATURE: 97.7 F | HEIGHT: 62 IN | DIASTOLIC BLOOD PRESSURE: 77 MMHG

## 2020-06-18 DIAGNOSIS — Z3A.30 30 WEEKS GESTATION OF PREGNANCY: ICD-10-CM

## 2020-06-18 DIAGNOSIS — Z36.89 ENCOUNTER FOR ULTRASOUND TO ASSESS FETAL GROWTH: Primary | ICD-10-CM

## 2020-06-18 DIAGNOSIS — O99.210 OBESITY AFFECTING PREGNANCY, ANTEPARTUM: ICD-10-CM

## 2020-06-18 PROCEDURE — 76816 OB US FOLLOW-UP PER FETUS: CPT | Performed by: OBSTETRICS & GYNECOLOGY

## 2020-06-18 PROCEDURE — 99211 OFF/OP EST MAY X REQ PHY/QHP: CPT | Performed by: OBSTETRICS & GYNECOLOGY

## 2020-06-20 LAB
BASOPHILS # BLD AUTO: 0 X10E3/UL (ref 0–0.2)
BASOPHILS NFR BLD AUTO: 0 %
EOSINOPHIL # BLD AUTO: 0.1 X10E3/UL (ref 0–0.4)
EOSINOPHIL NFR BLD AUTO: 1 %
ERYTHROCYTE [DISTWIDTH] IN BLOOD BY AUTOMATED COUNT: 15.6 % (ref 11.7–15.4)
FERRITIN SERPL-MCNC: 9 NG/ML (ref 15–150)
GLUCOSE 1H P 50 G GLC PO SERPL-MCNC: 186 MG/DL (ref 65–139)
HCT VFR BLD AUTO: 27.6 % (ref 34–46.6)
HGB BLD-MCNC: 8.9 G/DL (ref 11.1–15.9)
IMM GRANULOCYTES # BLD: 0 X10E3/UL (ref 0–0.1)
IMM GRANULOCYTES NFR BLD: 0 %
IRON SATN MFR SERPL: 12 % (ref 15–55)
IRON SERPL-MCNC: 60 UG/DL (ref 27–159)
LYMPHOCYTES # BLD AUTO: 1.8 X10E3/UL (ref 0.7–3.1)
LYMPHOCYTES NFR BLD AUTO: 22 %
MCH RBC QN AUTO: 27 PG (ref 26.6–33)
MCHC RBC AUTO-ENTMCNC: 32.2 G/DL (ref 31.5–35.7)
MCV RBC AUTO: 84 FL (ref 79–97)
MONOCYTES # BLD AUTO: 0.6 X10E3/UL (ref 0.1–0.9)
MONOCYTES NFR BLD AUTO: 7 %
NEUTROPHILS # BLD AUTO: 5.9 X10E3/UL (ref 1.4–7)
NEUTROPHILS NFR BLD AUTO: 70 %
PLATELET # BLD AUTO: 244 X10E3/UL (ref 150–450)
RBC # BLD AUTO: 3.3 X10E6/UL (ref 3.77–5.28)
RPR SER QL: NON REACTIVE
TIBC SERPL-MCNC: 511 UG/DL (ref 250–450)
UIBC SERPL-MCNC: 451 UG/DL (ref 131–425)
WBC # BLD AUTO: 8.4 X10E3/UL (ref 3.4–10.8)

## 2020-06-23 ENCOUNTER — ROUTINE PRENATAL (OUTPATIENT)
Dept: FAMILY MEDICINE CLINIC | Facility: CLINIC | Age: 30
End: 2020-06-23

## 2020-06-23 VITALS
TEMPERATURE: 98.2 F | HEIGHT: 62 IN | BODY MASS INDEX: 32.74 KG/M2 | SYSTOLIC BLOOD PRESSURE: 130 MMHG | WEIGHT: 177.9 LBS | DIASTOLIC BLOOD PRESSURE: 60 MMHG

## 2020-06-23 DIAGNOSIS — Z98.891 STATUS POST PRIMARY LOW TRANSVERSE CESAREAN SECTION: ICD-10-CM

## 2020-06-23 DIAGNOSIS — D50.8 IRON DEFICIENCY ANEMIA SECONDARY TO INADEQUATE DIETARY IRON INTAKE: ICD-10-CM

## 2020-06-23 DIAGNOSIS — O99.210 OBESITY AFFECTING PREGNANCY, ANTEPARTUM: ICD-10-CM

## 2020-06-23 DIAGNOSIS — Z3A.31 31 WEEKS GESTATION OF PREGNANCY: Primary | ICD-10-CM

## 2020-06-23 DIAGNOSIS — O24.419 GESTATIONAL DIABETES MELLITUS (GDM) IN THIRD TRIMESTER, GESTATIONAL DIABETES METHOD OF CONTROL UNSPECIFIED: ICD-10-CM

## 2020-06-23 LAB
SL AMB  POCT GLUCOSE, UA: NORMAL
SL AMB POCT CLARITY,UA: ABNORMAL
SL AMB POCT COLOR,UA: ABNORMAL
SL AMB POCT URINE PROTEIN: ABNORMAL

## 2020-06-23 PROCEDURE — 0502F SUBSEQUENT PRENATAL CARE: CPT | Performed by: OBSTETRICS & GYNECOLOGY

## 2020-06-24 LAB
AMPHETAMINES UR QL SCN: NEGATIVE NG/ML
BARBITURATES UR QL SCN: NEGATIVE NG/ML
BENZODIAZ UR QL: NEGATIVE NG/ML
BZE UR QL: NEGATIVE NG/ML
CANNABINOIDS UR QL SCN: NEGATIVE NG/ML
METHADONE UR QL SCN: NEGATIVE NG/ML
OPIATES UR QL: NEGATIVE NG/ML
PCP UR QL: NEGATIVE NG/ML
PROPOXYPH UR QL SCN: NEGATIVE NG/ML

## 2020-06-27 ENCOUNTER — NURSE TRIAGE (OUTPATIENT)
Dept: OTHER | Facility: OTHER | Age: 30
End: 2020-06-27

## 2020-06-29 ENCOUNTER — TELEPHONE (OUTPATIENT)
Dept: PERINATAL CARE | Facility: CLINIC | Age: 30
End: 2020-06-29

## 2020-06-30 ENCOUNTER — TELEPHONE (OUTPATIENT)
Dept: OTHER | Facility: OTHER | Age: 30
End: 2020-06-30

## 2020-06-30 ENCOUNTER — NURSE TRIAGE (OUTPATIENT)
Dept: OTHER | Facility: OTHER | Age: 30
End: 2020-06-30

## 2020-06-30 ENCOUNTER — HOSPITAL ENCOUNTER (OUTPATIENT)
Facility: HOSPITAL | Age: 30
Discharge: HOME | End: 2020-06-30
Attending: FAMILY MEDICINE | Admitting: FAMILY MEDICINE
Payer: COMMERCIAL

## 2020-06-30 VITALS
OXYGEN SATURATION: 96 % | HEIGHT: 62 IN | WEIGHT: 178 LBS | RESPIRATION RATE: 16 BRPM | HEART RATE: 88 BPM | DIASTOLIC BLOOD PRESSURE: 57 MMHG | BODY MASS INDEX: 32.76 KG/M2 | SYSTOLIC BLOOD PRESSURE: 117 MMHG | TEMPERATURE: 98.3 F

## 2020-06-30 DIAGNOSIS — N94.9 ROUND LIGAMENT PAIN: Primary | ICD-10-CM

## 2020-06-30 PROBLEM — Z3A.32 32 WEEKS GESTATION OF PREGNANCY: Status: ACTIVE | Noted: 2020-04-22

## 2020-06-30 LAB
BILIRUB UR QL STRIP: NEGATIVE
CLARITY UR: CLEAR
COLOR UR: YELLOW
GLUCOSE SERPL-MCNC: 74 MG/DL (ref 65–140)
GLUCOSE UR STRIP-MCNC: NEGATIVE MG/DL
HGB UR QL STRIP.AUTO: NEGATIVE
KETONES UR STRIP-MCNC: ABNORMAL MG/DL
LEUKOCYTE ESTERASE UR QL STRIP: NEGATIVE
NITRITE UR QL STRIP: NEGATIVE
PH UR STRIP.AUTO: 7 [PH]
PROT UR STRIP-MCNC: NEGATIVE MG/DL
SP GR UR STRIP.AUTO: 1.01 (ref 1–1.03)
UROBILINOGEN UR QL STRIP.AUTO: 0.2 E.U./DL

## 2020-06-30 PROCEDURE — 76817 TRANSVAGINAL US OBSTETRIC: CPT | Performed by: STUDENT IN AN ORGANIZED HEALTH CARE EDUCATION/TRAINING PROGRAM

## 2020-06-30 PROCEDURE — 81003 URINALYSIS AUTO W/O SCOPE: CPT | Performed by: STUDENT IN AN ORGANIZED HEALTH CARE EDUCATION/TRAINING PROGRAM

## 2020-06-30 PROCEDURE — 99213 OFFICE O/P EST LOW 20 MIN: CPT | Performed by: OBSTETRICS & GYNECOLOGY

## 2020-06-30 PROCEDURE — 99214 OFFICE O/P EST MOD 30 MIN: CPT

## 2020-06-30 PROCEDURE — 82948 REAGENT STRIP/BLOOD GLUCOSE: CPT

## 2020-06-30 RX ORDER — SENNOSIDES 8.6 MG
650 CAPSULE ORAL EVERY 8 HOURS PRN
Qty: 30 TABLET | Refills: 0
Start: 2020-06-30 | End: 2020-08-07 | Stop reason: HOSPADM

## 2020-07-01 NOTE — DISCHARGE INSTRUCTIONS
Round Ligament Pain   AMBULATORY CARE:   Round ligament pain  is caused when ligaments are stretched as your uterus (womb) gets bigger during pregnancy  Round ligaments are found on each side of your uterus  They are bands of tissue that hold the uterus in place  Round ligament pain happens most often during the second trimester  It is a normal part of pregnancy and should stop by the third trimester  The pain is not serious and will not hurt your baby  Common signs and symptoms of round ligament pain:   · Pain on one or both sides of your lower abdomen or groin that may move up to your hip    · Spasms in the muscles in your abdomen    · Pain that lasts a few seconds    · Pain that happens when you exercise, sneeze, change positions, or stand quickly  Contact your obstetrician if:   · You have pain that is spreading to other parts of your body  · You have new or worsening pain  · You have pain that lasts longer than a few minutes at a time  · You have questions or concerns about your condition or care  Manage your pain:  Round ligament pain does not need to be treated  The following may help make you more comfortable:  · Rest as often as you can  Rest can help relieve round ligament pain  You might want to lie on the side that has pain  Place a pillow under your abdomen  Keep another pillow between your knees  · Move slowly  Sudden movement can stretch the ligaments and cause pain  Stand, sit, and change positions slowly  Try to tighten the muscles in your hips before you sneeze or laugh  You can also sit down and bring your knees up toward your abdomen  This can help relieve tension on the ligaments  · Exercise as directed  Gentle exercise can keep the ligaments loose and strengthen core (abdominal) muscles  An example is swimming, or a yoga program designed for pregnancy  Ask your healthcare provider which exercises are safe for you and how often to exercise   For most healthy women, a good goal is to try to get at least 30 minutes of exercise every day  If activity causes pain, try not to walk too long or too far at one time  Break your exercise up into short amounts  · Apply a warm compress to the area  Warmth can relieve pain and muscle spasms  Ask your healthcare provider if you can take a warm bath or use a heating pad  Keep all heat settings low  High heat can be dangerous for your baby  Do not sit in a hot tub or use hot water in your bath  You may also be able to massage the area gently while you are applying heat  Massage can help relieve pain  · Ask about pain medicines  Ask your healthcare provider before you take any medicine during pregnancy, including over-the-counter pain medicines  Your healthcare provider may recommend acetaminophen to relieve the pain  Ask how much to take and how often to take it  Follow directions  Acetaminophen can cause liver damage  Too much medicine can be harmful to your baby  Follow up with your obstetrician as directed:  Write down your questions so you remember to ask them during your visits  © 2017 [facility] Information is for End User's use only and may not be sold, redistributed or otherwise used for commercial purposes  All illustrations and images included in CareNotes® are the copyrighted property of A D A M , Inc  or Iam Sanna  The above information is an  only  It is not intended as medical advice for individual conditions or treatments  Talk to your doctor, nurse or pharmacist before following any medical regimen to see if it is safe and effective for you  Nausea and Vomiting in Pregnancy   WHAT YOU NEED TO KNOW:   Nausea and vomiting can happen any time of day  These symptoms usually start before the 9th week of pregnancy, and end by the 14th week (second trimester)  Some women can have nausea and vomiting for a longer time   These symptoms can affect some women throughout the entire pregnancy  Nausea and vomiting do not harm your baby  These symptoms can make it hard for you to do your daily activities  DISCHARGE INSTRUCTIONS:   Return to the emergency department if:   · You have signs of dehydration  Examples are dark yellow urine, dry mouth and lips, dry skin, fast heartbeat, and urinating less than usual     · You have severe abdominal pain  · You feel too weak or dizzy to stand up  · You see blood in your vomit or bowel movements  Contact your healthcare provider if:   · You vomit more than 4 times in 1 day  · You have not been able to keep liquids down for more than 1 day  · You lose more than 2 pounds  · You have a fever  · Your nausea and vomiting continue longer than 14 weeks  · You have questions or concerns about your condition or care  Nutrition changes you can make to manage nausea and vomiting:   · Eat small meals throughout the day instead of 3 large meals  You may be more likely to have nausea and vomiting when your stomach is empty  Eat foods that are low in fat and high in protein  Examples are lean meat, beans, turkey, and chicken without the skin  Eat a small snack, such as crackers, dry cereal, or a small sandwich before you go to bed  · Eat some crackers or dry toast before you get out of bed in the morning  Get out of bed slowly  Sudden movements could cause you to get dizzy and nauseated  · Eat bland foods when you feel nauseated  Examples of bland foods include dry toast, dry cereal, plain pasta, white rice, and bread  Other bland foods include saltine crackers, bananas, gelatin, and pretzels  Avoid spicy, greasy, and fried foods  Avoid any other foods that make you feel nauseated  · Drink liquids that contain ginger  Drink ginger ale made with real ginger or ginger tea made with fresh grated ginger  Ginger capsules or ginger candies may also help to decrease nausea and vomiting       · Drink liquids between meals instead of with meals  Wait at least 30 minutes after you eat to drink liquids  Drink small amounts of liquids often throughout the day to prevent dehydration  Ask how much liquid you should drink each day  Other changes you can make to manage nausea and vomiting:   · Avoid smells that bother you  Strong odors may cause nausea and vomiting to start, or make it worse  Take a short walk, turn on a fan, or try to sleep with the window open to get fresh air  When you are cooking, open windows to get rid of smells that may cause nausea  · Do not brush your teeth right after you eat  if it makes you nauseated  · Rest when you need to  Start activity slowly and work up to your usual routine as you start to feel better  · Talk to your healthcare provider about your prenatal vitamins  Prenatal vitamins can cause nausea for some women  Try taking your prenatal vitamin at night or with a snack  If this change does not help, your healthcare provider may recommend a different type of vitamin  · Do not use any medicines, vitamins, or supplements to manage your symptoms without asking your healthcare provider  Many medicines can harm an unborn baby  · Light to moderate exercise  may help to decrease your symptoms  It may also help you to sleep better at night  Ask your healthcare provider about the best exercise plan for you  Follow up with your healthcare provider as directed:  Write down your questions so you remember to ask them during your visits  © 2017 [employer]  Information is for End User's use only and may not be sold, redistributed or otherwise used for commercial purposes  All illustrations and images included in CareNotes® are the copyrighted property of A D A M , Inc  or Iam Isidro  The above information is an  only  It is not intended as medical advice for individual conditions or treatments   Talk to your doctor, nurse or pharmacist before following any medical regimen to see if it is safe and effective for you  Pregnancy at 31 to 34 [address]:   You may continue to have symptoms such as shortness of breath, heartburn, contractions, or swelling of your ankles and feet  You may be gaining about 1 pound a week now  DISCHARGE INSTRUCTIONS:   Seek care immediately if:   · You develop a severe headache that does not go away  · You have new or increased vision changes, such as blurred or spotted vision  · You have new or increased swelling in your face or hands  · You have vaginal spotting or bleeding  · Your water broke or you feel warm water gushing or trickling from your vagina  Contact your healthcare provider if:   · You have more than 5 contractions in 1 hour  · You notice any changes in your baby's movements  · You have abdominal cramps, pressure, or tightening  · You have a change in vaginal discharge  · You have chills or a fever  · You have vaginal itching, burning, or pain  · You have yellow, green, white, or foul-smelling vaginal discharge  · You have pain or burning when you urinate, less urine than usual, or pink or bloody urine  · You have questions or concerns about your condition or care  How to care for yourself at this stage of your pregnancy:   · Eat a variety of healthy foods  Healthy foods include fruits, vegetables, whole-grain breads, low-fat dairy foods, beans, lean meats, and fish  Drink liquids as directed  Ask how much liquid to drink each day and which liquids are best for you  Limit caffeine to less than 200 milligrams each day  Limit your intake of fish to 2 servings each week  Choose fish low in mercury such as canned light tuna, shrimp, salmon, cod, or tilapia  Do not  eat fish high in mercury such as swordfish, tilefish, anthony mackerel, and shark  · Manage heartburn  by eating 4 or 5 small meals each day instead of large meals  Avoid spicy food       · Manage swelling  by lying down and putting your feet up  · Take prenatal vitamins as directed  Your need for certain vitamins and minerals, such as folic acid, increases during pregnancy  Prenatal vitamins provide some of the extra vitamins and minerals you need  Prenatal vitamins may also help to decrease the risk of certain birth defects  · Talk to your healthcare provider about exercise  Moderate exercise can help you stay fit  Your healthcare provider will help you plan an exercise program that is safe for you during pregnancy  · Do not smoke  If you smoke, it is never too late to quit  Smoking increases your risk of a miscarriage and other health problems during your pregnancy  Smoking can cause your baby to be born too early or weigh less at birth  Ask your healthcare provider for information if you need help quitting  · Do not drink alcohol  Alcohol passes from your body to your baby through the placenta  It can affect your baby's brain development and cause fetal alcohol syndrome (FAS)  FAS is a group of conditions that causes mental, behavior, and growth problems  · Talk to your healthcare provider before you take any medicines  Many medicines may harm your baby if you take them when you are pregnant  Do not take any medicines, vitamins, herbs, or supplements without first talking to your healthcare provider  Never use illegal or street drugs (such as marijuana or cocaine) while you are pregnant  Safety tips during pregnancy:   · Avoid hot tubs and saunas  Do not use a hot tub or sauna while you are pregnant, especially during your first trimester  Hot tubs and saunas may raise your baby's temperature and increase the risk of birth defects  · Avoid toxoplasmosis  This is an infection caused by eating raw meat or being around infected cat feces  It can cause birth defects, miscarriages, and other problems  Wash your hands after you touch raw meat  Make sure any meat is well-cooked before you eat it  Avoid raw eggs and unpasteurized milk  Use gloves or ask someone else to clean your cat's litter box while you are pregnant  Changes that are happening with your baby:  By 34 weeks, your baby may weigh more than 5 pounds  Your baby will be about 12 ½ inches long from the top of the head to the rump (baby's bottom)  Your baby is gaining about ½ pound a week  Your baby's eyes open and close now  Your baby's kicks and movements are more forceful at this time  What you need to know about prenatal care: Your healthcare provider will check your blood pressure and weight  You may also need the following:  · A urine test  may also be done to check for sugar and protein  These can be signs of gestational diabetes or infection  Protein in your urine may also be a sign of preeclampsia  Preeclampsia is a condition that can develop during week 20 or later of your pregnancy  It causes high blood pressure, and it can cause problems with your kidneys and other organs  · A Tdap vaccine  may be recommended by your healthcare provider  · Fundal height  is a measurement of your uterus to check your baby's growth  This number is usually the same as the number of weeks that you have been pregnant  Your healthcare provider may also check your baby's position  · Your baby's heart rate  will be checked  © 2017 [employer] [employer] Information is for End User's use only and may not be sold, redistributed or otherwise used for commercial purposes  All illustrations and images included in CareNotes® are the copyrighted property of A D A M , Inc  or Iam Isidro  The above information is an  only  It is not intended as medical advice for individual conditions or treatments  Talk to your doctor, nurse or pharmacist before following any medical regimen to see if it is safe and effective for you

## 2020-07-01 NOTE — PROGRESS NOTES
L&D Triage Note - OB/GYN  Dina Shen 30 y o  female MRN: [id number]  Unit/Bed#: Brenda Perrybus Encounter: [id number]    Patient is seen by Ulices Mcgovern    ASSESSMENT:  Dina Shen is a 30 y o  [id number] at [address] [facility] who presents with 1 day h/o pelvic pressure accompanied by nausea, 1 episode of vomiting, poor po intake and low back pain  PLAN:  R/o PTL  - AVSS  - Speculum showing closed cervix, normal discharge  No pooling or bleeding   - Dry slide with no ferning  - KOH/WM unremarkable  - No contractions on tocometry  - Cervical length 3 72 - 4 24 cm  - UA unremarkable    Disposition  - Nausea/malaise likely secondary to poor po intake  - Advised frequent small meals and at least 2000 calories/day to avoid hunger/nausea, can also take lety for symptomatic relief  - Pelvic/lumbar pain likely round ligament pain, anterior pelvic tilt - she can take tylenol for pain  - Discharge from [facility] triage with  labor precautions  Reviewed rupture of membranes, false vs true labor, decreased fetal movement, and vaginal bleeding  - Pt to call provider with any concerns and follow up at her next scheduled prenatal appointment   - Continue routine prenatal care   - Case discussed with Dr Flores Goldberg:  Dina Shen is a 30 y o  [id number] at [address] [facility] with an Estimated Date of Delivery: 20 here today for complaints of increased pelvic pressure since last night  Reports pain is 10/10 and is constant  Pain accompanied by 2-3 day h/o general malaise, nausea, LBP and 1 episode of vomiting on the way to triage  Patient states she has "been starving myself" since faily 1 hour glucola, only eating 3 oranges/day and sipping on iced chips  Denies fever, chills, flank pain, dysuria, hematuria  States she has had pyelonephritis in prior pregnancies, but symptoms were much more severe than current       Contractions: No  Leakage of fluid: No  Vaginal Bleeding: No  Fetal movement: present    Her obstetrical history is significant for:  - 5 previous full-term vaginal deliveries without apparent complications, she had a full-term  delivery with her last delivery  She has had 2 terminations of pregnancy and 1 spontaneous miscarriage as well as 1 ectopic pregnancy  OBJECTIVE:  Vitals:    20 2102   BP: 117/57   Pulse: 88   Resp: 16   Temp: 98 3 °F (36 8 °C)   SpO2:        ROS:  Constitutional: Negative for fever, chills  Respiratory: Negative for cough, SOB  Cardiovascular: Negative for chest pain    Gastrointestinal: Negative for n/v/d    General Physical Exam:  General: NAD, cooperative, pleasant, comfortable  Cardiovascular: S1/S2 normal  Lungs: CTAB, non-labored breathing  Abdomen: Soft, nontender  Gravid uterus  Lower extremeties: nontender    Cervical Exam  Speculum: Cervical os is closed on visualization  No evidence of bleeding or pooling of fluid  Normal physiologic discharge present      Fetal monitoring:  FHT:  135 bpm/ Moderate 6 - 25 bpm / accelerations present, no decelerations  [place]son: None    KOH/Wet [place]:     Infection:   - no clue cells    - no hyphae   - no trichomonads present    Membrane status   - no ferning   - no pooling    Imaging:       TVUS   - Cervical length - performed by Dr Dexter Pedraza    - 3 72 cm    - 4 24 cm    - 4 07 cm    Gabriel Garcia DO  PGY-1, [facility]  2020 8:50 PM

## 2020-07-06 ENCOUNTER — TELEMEDICINE (OUTPATIENT)
Dept: PERINATAL CARE | Facility: CLINIC | Age: 30
End: 2020-07-06
Payer: COMMERCIAL

## 2020-07-06 DIAGNOSIS — O24.419 GESTATIONAL DIABETES MELLITUS (GDM) IN THIRD TRIMESTER, GESTATIONAL DIABETES METHOD OF CONTROL UNSPECIFIED: Primary | ICD-10-CM

## 2020-07-06 DIAGNOSIS — Z3A.33 33 WEEKS GESTATION OF PREGNANCY: ICD-10-CM

## 2020-07-06 PROCEDURE — G0109 DIAB MANAGE TRN IND/GROUP: HCPCS | Performed by: DIETITIAN, REGISTERED

## 2020-07-06 NOTE — PROGRESS NOTES
[facility] Prenatal visit    S: 30 y o  [id number] here for PN visit  Patient reports requency, urgency & dysuria x2d  She has no obstetric complaints, including pelvic pain, contractions, vaginal bleeding, loss of fluid, or decreased fetal movement  Patient endorses normal fetal movement  She resented to triage on  w/ c/o pelvic pressure - patient was d/c home w/ precautions  COVID19 Screen:    Cough: no  Fever: no  Shortness of breath: no  Known exposures to COVID-19, or international travel:no    O:  Vitals:    20 1007   BP: 120/60   Temp: 97 9 °F (36 6 °C)     BP Readings from Last 3 Encounters:   20 117/57   20 130/60   20 123/77      Physical Exam   Constitutional: She appears well-developed  No distress  HENT:   Head: Normocephalic and atraumatic  Eyes: Conjunctivae are normal    Cardiovascular: Normal rate, regular rhythm and normal heart sounds  No murmur heard  Pulmonary/Chest: Effort normal and breath sounds normal  She has no wheezes  She has no rales  Abdominal: Soft  There is no tenderness  Musculoskeletal: Normal range of motion  She exhibits no edema  Neg CVA tenderness b/l   Neurological: She is alert  Skin: Skin is warm and dry  Psychiatric: She has a normal mood and affect       FHT: 143  Fundal height: 35 cm    A/P:  Problem List Items Addressed This Visit        Endocrine    Gestational diabetes mellitus (GDM) in third trimester     · Elevated 1h GTT on 20: 189  · Patient has established care with nutritionist on 20  · Counseled to keep a food journal  · F/u apt w/ MFM scheduled on 20  · Glucometer to be ordered by nutritionist per patient   · OB nurse is aware & will f/u w/ nutritionist  · Patient also instructed to contact nutritionist              Other    Bipolar disorder ([facility] [address] )     · Stable  · Endorses seeing psychiatrist every months  · C/w Latuda  · Denies SI/HI         S/P primary low transverse  · Emergency LTCS for fetal intolerance about 2y ago    · Patient initially planned for repeat LTCS w/ BTL, now she is considering TOLAC w/ BTL            33 weeks gestation of pregnancy    Iron deficiency anemia during pregnancy        Lab Value Date/Time    HGB 8 9 (L) 06/19/2020 1007    HGB 9 2 (L) 04/28/2020 1210    HGB 9 5 (L) 04/08/2020 1430    HGB 11 6 03/01/2020 2307 ·   Persistently low hb despite PO iron  · Patient is a candidate for Venofer   · To be ordered pending authorization (OB R N  [place])         Sterilization education     · Patient would like to have a BTL   · The risks, benefits & alternatives were discussed  · All questions were answered & she verbalizes understanding  · Consent was obtained on 7/7/20 (to be scanned to the patient chart)         History of drug use     Component      Latest Ref Rng & Units 6/25/2019 4/23/2020 4/28/2020 5/21/2020   Amphetamine Screen, Ur      Hlhnai=2707 ng/mL Negative Negative Negative Negative   Barbiturate Screen, Ur      Hrxoyl=338 ng/mL Negative Negative Negative Negative   BENZODIAZEPINES      Mhxjhx=338 ng/mL Negative Negative Negative Negative   Cannabinoid Scrn, Ur      Cutoff=50 ng/mL Negative Positive (A) Positive (A) Positive (A)   Cocaine (Metab ) Urine      Eqnefp=339 ng/mL Negative Negative Negative Negative   Opiate Scrn, Ur      Fgatsi=674 ng/mL Negative Negative Negative Negative   Phencyclidine (PCP), Qual, Ur      Cutoff=25 ng/mL Negative Negative Negative Negative   Methadone Screen, Urine      Pajwtc=188 ng/mL Negative Negative Negative Negative   Propoxyphene Screen, Urine      Chgeov=770 ng/mL Negative Negative Negative Negative     Component      Latest Ref Rng & Units 6/23/2020   Amphetamine Screen, Ur      Wcixjp=2551 ng/mL Negative   Barbiturate Screen, Ur      Tsbshh=721 ng/mL Negative   BENZODIAZEPINES      Xscbls=885 ng/mL Negative   Cannabinoid Scrn, Ur      Cutoff=50 ng/mL Negative   Cocaine (Metab ) Urine      Kzsjyc=760 ng/mL Negative   Opiate Scrn, Ur      Pmaump=070 ng/mL Negative   Phencyclidine (PCP), Qual, Ur      Cutoff=25 ng/mL Negative   Methadone Screen, Urine      Ubjhja=151 ng/mL Negative   Propoxyphene Screen, Urine      Fqfmma=448 ng/mL Negative     · Repeat UDS ordered         Low blood hemoglobin A2     · Per lab done on 4/28/20  · Per interpretation: Low Hgb A2 may indicate an alpha-thalassemia or iron deficiency  Other Visit Diagnoses     Prenatal care in third trimester    -  Primary    Relevant Orders    Toxicology screen, urine    POCT urine dip (Completed)    Urine culture    Encounter for screening for HIV        Relevant Orders    HIV 1/2 Antigen/Antibody (4th Generation) w Reflex [facility]    Need for diphtheria-tetanus-pertussis (Tdap) vaccine        Grand multipara in labor, third trimester              Patient is considering a repeat LTCS (primary was for an emergency fetal intolerance on 10/21/2017)  Today, patient is unsure about TOLAC  She was counseled about both options  F/u at next visit    Patient deferred Tdap    Seema Allred DO  7/7/2020  11:58 AM

## 2020-07-06 NOTE — PROGRESS NOTES
Thank you for referring your patient to [facility] Maternal Fetal Medicine Diabetes Education Program      Jr Murillo is a  30 y o  female who presents today for Initial visit (class 1)  Patient is at 33w2d gestation, Estimated Date of Delivery: 20  Reviewed and updated the following from patients medical record: PMH, Problem List, Allergies, and Current Medications  Diagnosis:  Medical Diagnosis/ICD 10: GDM third trimester method of control unspecified    Discussed with patient what GDM is, pathophysiology of GDM, untreated GDM and maternal fetal complications including fetal macrosomia,  hypoglycemia, polyhydramnios, increased incidence of  section,  labor, and in severe cases fetal demise and still birth   Discussed importance of blood glucose monitoring, nutrition, and medication if necessary in achieving BG goals  Discussed resumption of non-diabetic state post delivery, but reviewed increased risk of Type 2 DM later in life and ways to reduce risk by maintaining a healthy weight and eating habits  PMH/PSH:  Past Medical History:   Diagnosis Date    Anemia     Anxiety     [facility]ipolar [facility] disorder ([facility] )     Depression     Gestational diabetes mellitus (GDM) in third trimester 2020    Obesity     Psychiatric disorder     bipolar    Substance abuse ([facility] )     UTI (urinary tract infection) 2019    Varicella     Child Hx     Past Surgical History:   Procedure Laterality Date     SECTION      [place]  DELIVERY ONLY Bilateral 10/21/2017    Procedure:  SECTION (); Surgeon: Ricky Myles MD;  [facility]: [facility] ;  Service: Obstetrics    [place] LAP,DIAGNOSTIC ABDOMEN N/A 2016    Procedure: EXPLORATORY LAPAROTOMY, LEFT SALPINGECTOMY;  Surgeon: Ish Pineda MD;  [facility];  Service: Gynecology    [place] SURG RX MISSED ABORTN,1ST TRI N/A 7/10/2019    Procedure: DILATATION AND EVACUATION (D&E) (8 WEEKS);   Surgeon: Abeba Gold Sean Murdock MD;  Location: [facility];  Service: Gynecology       Personal History of diabetes? no  Family History of diabetes? yes grandmother DM2  Previous diabetes education?: no    Labs:  No components found for: HGA1C  No results found for: EAG  Lab Results   Component Value Date    [id number] 186 (H) 06/19/2020       Medications:  Current Outpatient Medications on File Prior to Visit   Medication Sig Dispense Refill    acetaminophen (TYLENOL) 650 mg CR tablet Take 1 tablet (650 mg total) by mouth every 8 (eight) hours as needed for mild pain (pelvic pain) 30 tablet 0    albuterol (PROVENTIL HFA,VENTOLIN HFA) 90 mcg/act inhaler Inhale 2 puffs every 6 (six) hours as needed for wheezing or shortness of breath 1 Inhaler 5    ferrous sulfate 324 (65 Fe) mg Take daily at bedtime With orange juice 90 tablet 1    lurasidone (Latuda) 20 mg tablet Take 20 mg by mouth daily with breakfast      Prenatal Vit-Fe Fumarate-FA (PNV FOLIC ACID + IRON) 74-9 MG TABS Take 1 tablet by mouth daily 90 tablet 1     No current facility-administered medications on file prior to visit          Recent Ultrasound Report:   DATE: 6/28  Fetal Growth: Normal  ANGELINE: Normal    Anthropometrics:  Ht Readings from Last 3 Encounters:   06/30/20 5' 2" (1 575 m)   06/23/20 5' 2" (1 575 m)   06/18/20 5' 2" (1 575 m)     Wt Readings from Last 3 Encounters:   06/30/20 80 7 kg (178 lb)   06/23/20 80 7 kg (177 lb 14 4 oz)   06/18/20 80 6 kg (177 lb 9 6 oz)       Pre-gravid weight: 89 4 kg (197 lb) NOTE: Patients stated pre- gravid weight is 169 lbs not 197 which is what is in her EMR  Pre-gravid BMI: 36 0  Weight Change: 9 lb weight gain   Weight gain recommendations: BMI (> 30) 11-20 lbs    Blood Glucose Monitoring:   Glucose Meter: OneTouch Verio Flex  Instructed on testing blood sugars: 4 x per day (Fasting, 2 hour after start of each meal)  Blood sugar reading during demo:patient did not have meter or testing supplies prior to appointment therefore did not test    Gave instruction on site selection, skin preparation, loading strips and lancet device, meter activation, obtaining blood sample, test strip and lancet disposal and storage, and recording log book entries  Patient has good understanding of material covered and was able to test their own blood sugar in office today  Instruction for reporting blood sugar results weekly via:   Phone: [phone]   Fax: [phone]   My Chart (Message, or Glucose tracking form)    Goal Blood Sugar Ranges:    Fastin-90 mg/dL   1 hour after the start of each meal: 135 mg/dL or <   2 hours after start of each meal: 120 mg/dL or <    Meal Plan (daily calorie and protein needs):  Calories: 2100 calories  Protein: 98 gm/day      Diet Instruction:  1  Patient was provided with a meal plan including 3 meals and 3 snacks  2  Discussed appropriate amounts of CHO, PRO, and Fat at each meal and snack  3  Reviewed CHO exchange list, and portion sizes for both CHO and PRO via food models  4  Instruction on how to read a food label  5  Provided suggested meal/snack options to increase nutrition and maintain consistent meal and snack intakes  6  Instructed on how to keep a 3-day food diary to be brought to follow- up appointment  7  Encouraged  patient to eat every 2 0-3 5 hours while awake  8  Encouraged patient to go no longer than 8-10 hours fasting overnight until first meal of the day  Physical Activity:  Discussed benefits of physical activity to optimize blood glucose control, encouraged activity at patient is physically able  Always consult a physician prior to starting an exercise program  Recommend 20-30 minutes daily      Educational Materials Provided: Diabetes in Pregnancy Booklet, Meal Plan 2100 calorie, 3-day Food Log and self assessment form    Date to report blood sugars:   Class 2 (date):     Begin Time: 10:00 am  End Time: 11:00 am    Referring Provider: Rica Peacock DO    It was a pleasure working with them today  Please feel free to call with any questions or concerns  Vaishnavi Chavez RD, LDN  [facility] Maternal Fetal Medicine  Diabetes in Pregnancy Program  [address] [facility], [address] [facility]  Virtual Regular Visit      Assessment/Plan:    Problem List Items Addressed This Visit        Endocrine    Gestational diabetes mellitus (GDM) in third trimester - Primary      Other Visit Diagnoses     33 weeks gestation of pregnancy                   Reason for visit is   Chief Complaint   Patient presents with    Virtual Regular Visit        Encounter provider Vaishnavi Chavez    Provider located at [address] [place] [id number] [phone]      Recent Visits  Date Type Provider Dept   06/29/20 Telephone [email or web address]   Showing recent visits within past 7 days and meeting all other requirements     Today's Visits  Date Type Provider Dept   07/06/20 [facility]   Showing today's visits and meeting all other requirements     Future Appointments  No visits were found meeting these conditions  Showing future appointments within next 150 days and meeting all other requirements        The patient was identified by name and date of birth  Ashley Paul was informed that this is a telemedicine visit and that the visit is being conducted through [employer]  My office door was closed  No one else was in the room  She acknowledged consent and understanding of privacy and security of the video platform  The patient has agreed to participate and understands they can discontinue the visit at any time  Patient is aware this is a billable service  Subjective  Ashley Paul is a 30 y o  female          HPI     Past Medical History:   Diagnosis Date    Anemia     Anxiety     Bipolar 1 disorder ([address] )     Depression     Gestational diabetes mellitus (GDM) in third trimester 2020    Obesity     Psychiatric disorder     bipolar    [facility]     UTI (urinary tract infection) 2019    Varicella     Child Hx       Past Surgical History:   Procedure Laterality Date     SECTION      [place]  DELIVERY ONLY Bilateral 10/21/2017    Procedure:  SECTION (); Surgeon: Julian Rebolledo MD;  Location: [facility];  Service: Obstetrics    [place] LAP,DIAGNOSTIC ABDOMEN N/A 2016    Procedure: EXPLORATORY LAPAROTOMY, LEFT SALPINGECTOMY;  Surgeon: Sonya Ferreira MD;  Location:  MAIN OR;  Service: Gynecology    [place] SURG RX MISSED ABORTN,1ST TRI N/A 7/10/2019    Procedure: DILATATION AND EVACUATION (D&E) (8 WEEKS); Surgeon: Kimberly Lewis MD;  Location: [address] [place] [address];  Service: Gynecology       Current Outpatient Medications   Medication Sig Dispense Refill    acetaminophen (TYLENOL) 650 mg CR tablet Take 1 tablet (650 mg total) by mouth every 8 (eight) hours as needed for mild pain (pelvic pain) 30 tablet 0    albuterol (PROVENTIL HFA,VENTOLIN HFA) 90 mcg/act inhaler Inhale 2 puffs every 6 (six) hours as needed for wheezing or shortness of breath 1 Inhaler 5    ferrous sulfate 324 (65 Fe) mg Take daily at bedtime With orange juice 90 tablet 1    lurasidone (Latuda) 20 mg tablet Take 20 mg by mouth daily with breakfast      Prenatal Vit-Fe Fumarate-FA (PNV FOLIC ACID + IRON) 54-2 MG TABS Take 1 tablet by mouth daily 90 tablet 1     No current facility-administered medications for this visit  No Known Allergies    Review of Systems    Video Exam    There were no vitals filed for this visit  Physical Exam     As a result of this visit, I have not referred the patient for further respiratory evaluation  I spent 60 minutes directly with the patient during this visit      VIRTUAL VISIT DISCLAIMER    Theodora Montejo acknowledges that she has consented to an online visit or consultation   She understands that the online visit is based solely on information provided by her, and that, in the absence of a face-to-face physical evaluation by the physician, the diagnosis she receives is both limited and provisional in terms of accuracy and completeness  This is not intended to replace a full medical face-to-face evaluation by the physician  Ashley Paul understands and accepts these terms

## 2020-07-06 NOTE — PATIENT INSTRUCTIONS
1  Continue Meal Plan consisting of 3 meals and 3 snacks daily, including protein at each  2  Try to eat every 2-3 5 hours while awake  3  Do not exceed 8-10 hours fasting overnight  4  Continue self-monitoring blood glucose 4 times per day: fasting and 2 hours after the start of each meal  5  Submit blood sugar values weekly via: Telephone or My Chart glucose flowsheet  6  If no restriction from physician, recommend up to 30 minutes walking daily  9  Send message weekly to [facility] provider via [employer] message notifying that blood sugars have been logged   10  Will contact within 24-48 hours of message with feedback and recommendations based on week of numbers  11  Complete self assessment form and 3 day food log prior to class 2 appointment in 1 week  12  Send self assessment form and 3 day food log via [employer] message as image attachments to [facility] provider prior to class 2 appointment     13  Class 2 follow up appointment scheduled for Monday, 7/13

## 2020-07-07 ENCOUNTER — ROUTINE PRENATAL (OUTPATIENT)
Dept: FAMILY MEDICINE CLINIC | Facility: CLINIC | Age: 30
End: 2020-07-07
Payer: COMMERCIAL

## 2020-07-07 VITALS
TEMPERATURE: 97.9 F | WEIGHT: 180.6 LBS | DIASTOLIC BLOOD PRESSURE: 60 MMHG | SYSTOLIC BLOOD PRESSURE: 120 MMHG | HEIGHT: 62 IN | BODY MASS INDEX: 33.23 KG/M2

## 2020-07-07 DIAGNOSIS — Z23 NEED FOR DIPHTHERIA-TETANUS-PERTUSSIS (TDAP) VACCINE: ICD-10-CM

## 2020-07-07 DIAGNOSIS — D50.9 IRON DEFICIENCY ANEMIA DURING PREGNANCY: ICD-10-CM

## 2020-07-07 DIAGNOSIS — Z98.891 S/P PRIMARY LOW TRANSVERSE C-SECTION: ICD-10-CM

## 2020-07-07 DIAGNOSIS — Z11.4 ENCOUNTER FOR SCREENING FOR HIV: ICD-10-CM

## 2020-07-07 DIAGNOSIS — O24.410 DIET CONTROLLED GESTATIONAL DIABETES MELLITUS (GDM) IN THIRD TRIMESTER: ICD-10-CM

## 2020-07-07 DIAGNOSIS — F31.9 BIPOLAR AFFECTIVE DISORDER, REMISSION STATUS UNSPECIFIED (HCC): ICD-10-CM

## 2020-07-07 DIAGNOSIS — O99.019 IRON DEFICIENCY ANEMIA DURING PREGNANCY: ICD-10-CM

## 2020-07-07 DIAGNOSIS — Z3A.33 33 WEEKS GESTATION OF PREGNANCY: ICD-10-CM

## 2020-07-07 DIAGNOSIS — D64.9 LOW BLOOD HEMOGLOBIN A2: ICD-10-CM

## 2020-07-07 DIAGNOSIS — Z30.09 STERILIZATION EDUCATION: ICD-10-CM

## 2020-07-07 DIAGNOSIS — Z87.898 HISTORY OF DRUG USE: ICD-10-CM

## 2020-07-07 DIAGNOSIS — Z34.93 PRENATAL CARE IN THIRD TRIMESTER: Primary | ICD-10-CM

## 2020-07-07 PROBLEM — F19.91 HISTORY OF DRUG USE: Status: ACTIVE | Noted: 2020-07-07

## 2020-07-07 LAB
SL AMB  POCT GLUCOSE, UA: NORMAL
SL AMB LEUKOCYTE ESTERASE,UA: 25
SL AMB POCT BILIRUBIN,UA: NORMAL
SL AMB POCT BLOOD,UA: NORMAL
SL AMB POCT CLARITY,UA: CLEAR
SL AMB POCT COLOR,UA: YELLOW
SL AMB POCT KETONES,UA: NORMAL
SL AMB POCT NITRITE,UA: NORMAL
SL AMB POCT PH,UA: 7
SL AMB POCT SPECIFIC GRAVITY,UA: 1.01
SL AMB POCT URINE PROTEIN: NORMAL
SL AMB POCT UROBILINOGEN: 1

## 2020-07-07 PROCEDURE — 0502F SUBSEQUENT PRENATAL CARE: CPT | Performed by: OBSTETRICS & GYNECOLOGY

## 2020-07-07 PROCEDURE — 81002 URINALYSIS NONAUTO W/O SCOPE: CPT | Performed by: OBSTETRICS & GYNECOLOGY

## 2020-07-07 RX ORDER — BLOOD SUGAR DIAGNOSTIC
STRIP MISCELLANEOUS
Qty: 100 EACH | Refills: 3 | Status: SHIPPED | OUTPATIENT
Start: 2020-07-07

## 2020-07-07 RX ORDER — SODIUM CHLORIDE 9 MG/ML
20 INJECTION, SOLUTION INTRAVENOUS ONCE
Status: CANCELLED | OUTPATIENT
Start: 2020-07-13

## 2020-07-07 RX ORDER — BLOOD-GLUCOSE METER
EACH MISCELLANEOUS
Qty: 1 KIT | Refills: 0 | Status: SHIPPED | OUTPATIENT
Start: 2020-07-07

## 2020-07-07 RX ORDER — SODIUM CHLORIDE 9 MG/ML
20 INJECTION, SOLUTION INTRAVENOUS ONCE
Status: CANCELLED | OUTPATIENT
Start: 2020-07-17

## 2020-07-07 RX ORDER — LANCETS 33 GAUGE
EACH MISCELLANEOUS
Qty: 100 EACH | Refills: 3 | Status: SHIPPED | OUTPATIENT
Start: 2020-07-07

## 2020-07-07 RX ORDER — LANCETS
EACH MISCELLANEOUS
Qty: 1 EACH | Refills: 3 | Status: CANCELLED | OUTPATIENT
Start: 2020-07-07

## 2020-07-07 NOTE — ASSESSMENT & PLAN NOTE
· Elevated 1h GTT on 6/19/20: 189  · Patient has established care with nutritionist on 7/6/20  · Counseled to keep a food journal  · F/u apt w/ MFM scheduled on 7/9/20  · Glucometer to be ordered by nutritionist per patient   · OB nurse is aware & will f/u w/ nutritionist  · Patient also instructed to contact nutritionist

## 2020-07-07 NOTE — ASSESSMENT & PLAN NOTE
Lab Value Date/Time    HGB 8 9 (L) 06/19/2020 1007    HGB 9 2 (L) 04/28/2020 1210    HGB 9 5 (L) 04/08/2020 1430    HGB 11 6 03/01/2020 2307   · Persistently low hb despite PO iron  · Patient is a candidate for Venofer   · To be ordered pending authorization (OB R N  [place])

## 2020-07-07 NOTE — PATIENT INSTRUCTIONS
Gestational Diabetes   AMBULATORY CARE:   Gestational diabetes (GDM)  is a type of diabetes that develops during pregnancy, usually in the second or third trimester  GDM causes your blood sugar level to rise too high  This can harm you and your unborn baby  Blood sugar levels usually go back to normal after you give birth  Common symptoms include the following:   · More hunger or thirst than usual     · Frequent urination     · Blurred vision     · More fatigue (tired) than usual     · Frequent bladder, vaginal, or skin infections     · More weight gain than your healthcare provider suggests during your pregnancy     · Nausea or vomiting  Seek care immediately if:   · Your heartbeat is fast and weak, or your breathing is fast and shallow  · You are more sleepy than usual or become confused  · You have blurred or double vision  · Your breath has a fruity, sweet smell  · You are shaking or sweating  Contact your healthcare provider if:   · Your blood sugar level is below 70 mg/dL or above 250 mg/dL and does not improve with treatment  · You have a headache, or you are dizzy  · You think your baby is not moving as much as usual     · You have more hunger or thirst than usual      · You are urinating more often than usual      · You have an upset stomach and are vomiting  · You have questions or concerns about your condition or care  Check your blood sugar level as directed:   · You will be taught how to check a small drop of blood in a glucose monitor  Ask your healthcare provider when and how often to check your blood sugar level during the day  You may need to check your blood sugar level at least 3 times each day  · Ask your healthcare provider what your blood sugar levels should be before and after you eat  He may suggest that your blood sugar level should be at or below 95 mg/dL before you eat   The level may need to be at or below 140 mg/dL 1 hour after you eat or at or below 120 mg/dL 2 hours after you eat  Write down your results, and show them to your healthcare provider  He may use the results to make changes to your medicine, food, and exercise schedules  Control GDM:  GDM may be controlled with meal planning and exercise  The goal is to keep your blood sugar level as close to normal, as safely as possible  Your healthcare provider and dietitian will help set up a meal and exercise plan for you  · Follow your meal plan as directed  Talk to a dietitian or healthcare provider about the best meal plan for you  She may recommend that you eat 3 small meals and 2 to 4 snacks every day  Control the amount of carbohydrates (such as bread, cereal, and fruit) you eat at each meal and snack  Too much carbohydrate in 1 meal or snack can cause your blood sugar to rise to a high level  Your dietitian or healthcare provider will tell you how much carbohydrate to eat at each meal and snack  Eat foods that are a good source of fiber, such as vegetables and legumes (beans and lentils)  · Ask your healthcare provider about the best exercise plan for you  Exercise helps keep your blood sugar level steady  A good goal is to exercise for at least 30 minutes, 5 days a week  Low-impact exercises such as walking or swimming are effective  · Insulin  may be needed if your diabetes is not controlled by nutrition and exercise  Insulin is safe to use during pregnancy  Manage GDM:   · Check your blood pressure often  High blood pressure can cause problems with your health and your pregnancy  Blood pressure readings are usually written as 2 numbers  Your systolic blood pressure (the first number) should be between 110 and 129  Your diastolic blood pressure (the second number) should be between 65 and 79      · Maintain a healthy weight  Ask your healthcare provider how much you should weigh  A healthy weight can help you control your GDM   Ask him to help you create a weight loss plan if you are overweight  · Do not smoke  Nicotine is dangerous for you and your baby and can make it harder to manage your GDM  Do not use e-cigarettes or smokeless tobacco in place of cigarettes or to help you quit  They still contain nicotine  Ask your healthcare provider for information if you currently smoke and need help quitting  Follow up with your healthcare provider as directed: You will need to have screening tests for diabetes 4 to 12 weeks after you have your baby  You may also need to have tests for diabetes every 3 years for life  Write down your questions so you remember to ask them during your visits  © 2017 [facility] Information is for End User's use only and may not be sold, redistributed or otherwise used for commercial purposes  All illustrations and images included in CareNotes® are the copyrighted property of [employer]  or [employer]  The above information is an  only  It is not intended as medical advice for individual conditions or treatments  Talk to your doctor, nurse or pharmacist before following any medical regimen to see if it is safe and effective for you  Gestational Diabetes Diet   WHAT YOU NEED TO KNOW:   What is a gestational diabetes diet? A gestational diabetes diet is a meal plan that helps control your blood sugar levels throughout your pregnancy  Too much carbohydrate in one meal or snack can cause your blood sugar to rise to a very high level  High blood sugar levels throughout your pregnancy can cause your baby to gain too much weight and lead to other health problems  A healthy meal plan will help you keep your blood sugar within the recommended range  Which meal plan is right for me? Carbohydrate counting and diabetes exchanges are meal planning methods that can help you control your blood sugar   Your dietitian or healthcare provider will tell you the amount of calories, carbohydrate, and other nutrients you need each day  She can also help you find the meal plan that meets your nutrient needs and that works best for you  What are some general guidelines I should follow? Your healthcare provider may recommend the following:  · Spread carbohydrates throughout the day  by eating 3 small to medium-sized meals plus 2 to 4 snacks  You may need to eat a snack in the evening to avoid low blood sugar during the night  Eat the same amount of carbohydrate during meals and snacks each day  · Eat fewer servings of carbohydrate at breakfast than at other meals  Your blood sugar level tends to be higher in the morning  Eat fewer servings of carbohydrate to keep your blood sugar level from increasing even more  · Do not skip meals or cut out carbohydrates  to try and control your blood sugar  Your blood sugar can fall to a low level and cause hypoglycemia (low blood sugar)  Which foods contain carbohydrates? Your healthcare provider or dietitian will tell you how many servings of carbohydrates you can have during each meal and snack  One serving of the foods below contain about 15 grams of carbohydrate    · Breads, cereals, and crackers:      ¨ 1 slice of bread, 1 6-inch tortilla, or ¼ of a large bagel     ¨ ½ cup of oatmeal    ¨ ½ hamburger, hot dog bun, or English muffin    ¨ 2 taco shells (5-inch size)    ¨ 4 to 6 small crackers or ¾ of an ounce of pretzels or potato chips    · Pasta, rice, starchy vegetables, and beans:      ¨ ? cup of cooked pasta or rice    ¨ ½ cup of casserole     ¨ ½ cup of frazier beans, black beans, or split peas    ¨ ½ cup of corn, green peas, potatoes, or winter squash    ¨ ¼ of a large baked potato    · Fruit:      ¨ 1 small fresh fruit such as an apple, orange, or peach    ¨ ½ cup of unsweetened fruit juice, canned fruit, or frozen fruit    ¨ 2 tablespoons of dried fruit    · Milk and yogurt:      ¨ 1 cup of fat-free or low-fat milk or soy milk    ¨ ? cup (6 ounces) of fat-free yogurt sweetened with sugar-free sweetener    · Desserts or sweets:      ¨ 2 small cookies    ¨ ½ cup of ice cream or frozen yogurt    ¨ 1 tablespoon syrup, jam, jelly, table sugar, or honey  What other guidelines should I follow? · Check your blood sugar level as directed  Ask your healthcare provider when and how often to check your blood sugar during the day  Write down your blood sugar level each time you check it  You may need to bring this information to follow-up visits  · Exercise as directed  Exercise can help keep your blood sugar within the recommended levels  Exercise can also keep your weight in a healthy range during pregnancy  Talk to your healthcare provider about the type and amount of physical activity that is best for you  · Eat foods high in fiber  Choose foods that are a good source of fiber, such as fruits, vegetables, and whole-grain breads and cereals  Cereals that contain 5 or more grams of fiber per serving are a good source of fiber  Legumes such as kidney beans and lentils are also a good source  · Limit sweets and desserts  These foods are high in sugar, fat, and calories and low in healthy nutrients  · Limit the amount of fat you eat each day  Ask your dietitian or healthcare provider how much fat you should eat each day  Choose foods low in saturated fat, trans fat, and cholesterol  Examples include chicken without the skin and low-fat dairy foods  When should I contact my dietitian or healthcare provider? · Your blood sugar levels continue to be high, even though you are following your meal plan  · You have a low blood sugar level during certain times of the day  · You have questions or concerns about your meal plan, or you are having trouble following the plan  CARE AGREEMENT:   You have the right to help plan your care  Discuss treatment options with your caregivers to decide what care you want to receive  You always have the right to refuse treatment   The above information is an  only  It is not intended as medical advice for individual conditions or treatments  Talk to your doctor, nurse or pharmacist before following any medical regimen to see if it is safe and effective for you  © 2017 [employer]  Information is for End User's use only and may not be sold, redistributed or otherwise used for commercial purposes  All illustrations and images included in CareNotes® are the copyrighted property of [employer] M [employer] Inc  or [employer]am Is[employer]  Pregnancy at 31 to 34 Weeks   AMBULATORY CARE:   What changes are happening to your body: You may continue to have symptoms such as shortness of breath, heartburn, contractions, or swelling of your ankles and feet  You may be gaining about 1 pound a week now  Seek care immediately if:   · You develop a severe headache that does not go away  · You have new or increased vision changes, such as blurred or spotted vision  · You have new or increased swelling in your face or hands  · You have vaginal spotting or bleeding  · Your water broke or you feel warm water gushing or trickling from your vagina  Contact your healthcare provider if:   · You have more than 5 contractions in 1 hour  · You notice any changes in your baby's movements  · You have abdominal cramps, pressure, or tightening  · You have a change in vaginal discharge  · You have chills or a fever  · You have vaginal itching, burning, or pain  · You have yellow, green, white, or foul-smelling vaginal discharge  · You have pain or burning when you urinate, less urine than usual, or pink or bloody urine  · You have questions or concerns about your condition or care  How to care for yourself at this stage of your pregnancy:   · Eat a variety of healthy foods  Healthy foods include fruits, vegetables, whole-grain breads, low-fat dairy foods, beans, lean meats, and fish  Drink liquids as directed   Ask how much liquid to drink each day and which liquids are best for you  Limit caffeine to less than 200 milligrams each day  Limit your intake of fish to 2 servings each week  Choose fish low in mercury such as canned light tuna, shrimp, salmon, cod, or tilapia  Do not  eat fish high in mercury such as swordfish, tilefish, anthony mackerel, and shark  · Manage heartburn  by eating 4 or 5 small meals each day instead of large meals  Avoid spicy food  · Manage swelling  by lying down and putting your feet up  · Take prenatal vitamins as directed  Your need for certain vitamins and minerals, such as folic acid, increases during pregnancy  Prenatal vitamins provide some of the extra vitamins and minerals you need  Prenatal vitamins may also help to decrease the risk of certain birth defects  · Talk to your healthcare provider about exercise  Moderate exercise can help you stay fit  Your healthcare provider will help you plan an exercise program that is safe for you during pregnancy  · Do not smoke  If you smoke, it is never too late to quit  Smoking increases your risk of a miscarriage and other health problems during your pregnancy  Smoking can cause your baby to be born too early or weigh less at birth  Ask your healthcare provider for information if you need help quitting  · Do not drink alcohol  Alcohol passes from your body to your baby through the placenta  It can affect your baby's brain development and cause fetal alcohol syndrome (FAS)  FAS is a group of conditions that causes mental, behavior, and growth problems  · Talk to your healthcare provider before you take any medicines  Many medicines may harm your baby if you take them when you are pregnant  Do not take any medicines, vitamins, herbs, or supplements without first talking to your healthcare provider  Never use illegal or street drugs (such as marijuana or cocaine) while you are pregnant    Safety tips during pregnancy:   · Avoid hot tubs and saunas  Do not use a hot tub or sauna while you are pregnant, especially during your first trimester  Hot tubs and saunas may raise your baby's temperature and increase the risk of birth defects  · Avoid toxoplasmosis  This is an infection caused by eating raw meat or being around infected cat feces  It can cause birth defects, miscarriages, and other problems  Wash your hands after you touch raw meat  Make sure any meat is well-cooked before you eat it  Avoid raw eggs and unpasteurized milk  Use gloves or ask someone else to clean your cat's litter box while you are pregnant  Changes that are happening with your baby:  By 34 weeks, your baby may weigh more than 5 pounds  Your baby will be about 12 ½ inches long from the top of the head to the rump (baby's bottom)  Your baby is gaining about ½ pound a week  Your baby's eyes open and close now  Your baby's kicks and movements are more forceful at this time  What you need to know about prenatal care: Your healthcare provider will check your blood pressure and weight  You may also need the following:  · A urine test  may also be done to check for sugar and protein  These can be signs of gestational diabetes or infection  Protein in your urine may also be a sign of preeclampsia  Preeclampsia is a condition that can develop during week 20 or later of your pregnancy  It causes high blood pressure, and it can cause problems with your kidneys and other organs  · A Tdap vaccine  may be recommended by your healthcare provider  · Fundal height  is a measurement of your uterus to check your baby's growth  This number is usually the same as the number of weeks that you have been pregnant  Your healthcare provider may also check your baby's position  · Your baby's heart rate  will be checked  © 2017 [employer] [employer] Information is for End User's use only and may not be sold, redistributed or otherwise used for commercial purposes   All illustrations and images included in CareNotes® are the copyrighted property of [employer] Inc  or [employer]  The above information is an  only  It is not intended as medical advice for individual conditions or treatments  Talk to your doctor, nurse or pharmacist before following any medical regimen to see if it is safe and effective for you

## 2020-07-07 NOTE — ASSESSMENT & PLAN NOTE
· Emergency LTCS for fetal intolerance about 2y ago    · Patient initially planned for repeat LTCS w/ BTL, now she is considering TOLAC w/ BTL

## 2020-07-07 NOTE — ASSESSMENT & PLAN NOTE
· Patient would like to have a BTL   · The risks, benefits & alternatives were discussed  · All questions were answered & she verbalizes understanding  · Consent was obtained on 7/7/20 (to be scanned to the patient chart)

## 2020-07-07 NOTE — ASSESSMENT & PLAN NOTE
Component      Latest Ref Rng & Units 6/25/2019 4/23/2020 4/28/2020 5/21/2020   Amphetamine Screen, Ur      Jsyetw=6951 ng/mL Negative Negative Negative Negative   Barbiturate Screen, Ur      Phxgsh=068 ng/mL Negative Negative Negative Negative   BENZODIAZEPINES      Xhzsld=089 ng/mL Negative Negative Negative Negative   Cannabinoid Scrn, Ur      Cutoff=50 ng/mL Negative Positive (A) Positive (A) Positive (A)   Cocaine (Metab ) Urine      Mtdpbw=827 ng/mL Negative Negative Negative Negative   Opiate Scrn, Ur      Tfnkwt=961 ng/mL Negative Negative Negative Negative   Phencyclidine (PCP), Qual, Ur      Cutoff=25 ng/mL Negative Negative Negative Negative   Methadone Screen, Urine      Wklwhi=522 ng/mL Negative Negative Negative Negative   Propoxyphene Screen, Urine      Aksyel=435 ng/mL Negative Negative Negative Negative     Component      Latest Ref Rng & Units 6/23/2020   Amphetamine Screen, Ur      Kjvagd=0844 ng/mL Negative   Barbiturate Screen, Ur      Ytmhym=481 ng/mL Negative   BENZODIAZEPINES      Gyqymz=736 ng/mL Negative   Cannabinoid Scrn, Ur      Cutoff=50 ng/mL Negative   Cocaine (Metab ) Urine      Ecmuza=685 ng/mL Negative   Opiate Scrn, Ur      Lphlfv=228 ng/mL Negative   Phencyclidine (PCP), Qual, Ur      Cutoff=25 ng/mL Negative   Methadone Screen, Urine      Enbdeb=739 ng/mL Negative   Propoxyphene Screen, Urine      Idtgpj=151 ng/mL Negative     · Repeat UDS ordered

## 2020-07-07 NOTE — ASSESSMENT & PLAN NOTE
· Per lab done on 4/28/20  · Per interpretation: Low Hgb A2 may indicate an alpha-thalassemia or iron deficiency

## 2020-07-08 ENCOUNTER — TELEPHONE (OUTPATIENT)
Dept: PERINATAL CARE | Facility: CLINIC | Age: 30
End: 2020-07-08

## 2020-07-08 NOTE — TELEPHONE ENCOUNTER
Spoke with patient and confirmed appointment with [facility]  1 support person ( must be over age of 15) may accompany you for your appointment  Are you and your support person able to wear a mask without a valve during entire appointment? YES  [facility] does not allow cell phone use, recording device or streaming during the ultrasound  Check in and rooming questions will be done via phone, when you arrive in the parking lot please call the following inside line # prior to entering office:    Jenn Souza [phone]  [place] line: [address] [facility] line:  [phone] line: [phone]  [place] line:  [phone]  [place] line: [phone]    Do you or your support person currently have:  Fever or flu- like symptoms? NO  Symptoms of upper respiratory infection like runny nose, sore throat or cough? NO  Do you have new headache that you have not had in the past?NO  Have you experienced any new shortness of breath recently? NO  Do you have any new loss of taste or smell? NO  Do you have any new diarrhea, nausea or vomiting? NO  Have you recently been in contact with anyone who has been sick or diagnosed with COVID-19 infection? NO  Have you been recommended to quarantine because of an exposure to a confirmed positive COVID19 person? NO  You and your support person will be screened upon arrival     Patient verbalized understanding of all instructions   -------------------------------------------------------------

## 2020-07-13 ENCOUNTER — TELEPHONE (OUTPATIENT)
Dept: PERINATAL CARE | Facility: CLINIC | Age: 30
End: 2020-07-13

## 2020-07-13 NOTE — TELEPHONE ENCOUNTER
Called patient to reschedule  her missed appt from today and her mailbox was full I will try again at a later time      Thanks

## 2020-07-14 ENCOUNTER — ROUTINE PRENATAL (OUTPATIENT)
Dept: FAMILY MEDICINE CLINIC | Facility: CLINIC | Age: 30
End: 2020-07-14
Payer: COMMERCIAL

## 2020-07-14 VITALS
HEART RATE: 100 BPM | SYSTOLIC BLOOD PRESSURE: 116 MMHG | DIASTOLIC BLOOD PRESSURE: 68 MMHG | OXYGEN SATURATION: 98 % | WEIGHT: 179.4 LBS | BODY MASS INDEX: 32.81 KG/M2 | TEMPERATURE: 98.2 F

## 2020-07-14 DIAGNOSIS — Z23 ENCOUNTER FOR IMMUNIZATION: ICD-10-CM

## 2020-07-14 DIAGNOSIS — O26.899 SHORTNESS OF BREATH DUE TO PREGNANCY: ICD-10-CM

## 2020-07-14 DIAGNOSIS — D50.9 IRON DEFICIENCY ANEMIA DURING PREGNANCY: ICD-10-CM

## 2020-07-14 DIAGNOSIS — O99.019 IRON DEFICIENCY ANEMIA DURING PREGNANCY: ICD-10-CM

## 2020-07-14 DIAGNOSIS — O24.410 DIET CONTROLLED GESTATIONAL DIABETES MELLITUS (GDM) IN THIRD TRIMESTER: Primary | ICD-10-CM

## 2020-07-14 DIAGNOSIS — D64.9 LOW BLOOD HEMOGLOBIN A2: ICD-10-CM

## 2020-07-14 DIAGNOSIS — R06.02 SHORTNESS OF BREATH DUE TO PREGNANCY: ICD-10-CM

## 2020-07-14 DIAGNOSIS — F31.9 BIPOLAR AFFECTIVE DISORDER, REMISSION STATUS UNSPECIFIED (HCC): ICD-10-CM

## 2020-07-14 DIAGNOSIS — Z98.891 S/P PRIMARY LOW TRANSVERSE C-SECTION: ICD-10-CM

## 2020-07-14 DIAGNOSIS — Z87.898 HISTORY OF DRUG USE: ICD-10-CM

## 2020-07-14 DIAGNOSIS — Z3A.34 34 WEEKS GESTATION OF PREGNANCY: ICD-10-CM

## 2020-07-14 LAB
AMPHETAMINES UR QL SCN: NEGATIVE NG/ML
BARBITURATES UR QL SCN: NEGATIVE NG/ML
BENZODIAZ UR QL: NEGATIVE NG/ML
BZE UR QL: NEGATIVE NG/ML
CANNABINOIDS UR QL SCN: POSITIVE
METHADONE UR QL SCN: NEGATIVE NG/ML
OPIATES UR QL: NEGATIVE NG/ML
PCP UR QL: NEGATIVE NG/ML
PROPOXYPH UR QL SCN: NEGATIVE NG/ML
SL AMB  POCT GLUCOSE, UA: NORMAL
SL AMB POCT CLARITY,UA: ABNORMAL
SL AMB POCT COLOR,UA: ABNORMAL
SL AMB POCT URINE PROTEIN: ABNORMAL

## 2020-07-14 PROCEDURE — 90715 TDAP VACCINE 7 YRS/> IM: CPT | Performed by: OBSTETRICS & GYNECOLOGY

## 2020-07-14 PROCEDURE — 59426 ANTEPARTUM CARE ONLY: CPT | Performed by: FAMILY MEDICINE

## 2020-07-14 PROCEDURE — 90471 IMMUNIZATION ADMIN: CPT | Performed by: OBSTETRICS & GYNECOLOGY

## 2020-07-14 RX ORDER — ALBUTEROL SULFATE 90 UG/1
2 AEROSOL, METERED RESPIRATORY (INHALATION) EVERY 6 HOURS PRN
Qty: 1 INHALER | Refills: 5 | Status: SHIPPED | OUTPATIENT
Start: 2020-07-14

## 2020-07-14 NOTE — PROGRESS NOTES
OB/GYN prenatal visit    S: 30 y o  [id number] here for PN visit  She has no obstetric complaints, including pelvic pain, contractions, vaginal bleeding, loss of fluid, or decreased fetal movement  Patient with past medical history of anxiety, suicide attempt with the ibuprofen, ectopic pregnancy, spontaneous and induced abortions, marijuana use  Gravity Number:   PREGNANCY FULL TERM PREMATURE AB INDUCED AB SPONTANEOUS MULTIPLE BIRTH ECTOPIC LIVING   12 6 0 1 3 0 1 6          O:  Vitals:    07/14/20 1022   BP: 116/68   Pulse: 100   Temp: 98 2 °F (36 8 °C)   SpO2: 98%       Gen: no acute distress, nonlabored breathing  Cardio: RRR, S1 and S2 +  Resp: CTA b/l, no wheezes/rales/rhonchi  Abdomen:  Nontender, gravid uterus  Extremities: no cyanosis or edema  PP 2+ b/l       FHR: 132 bpm  Fundal height:  34 5 cm       Current Outpatient Medications:     albuterol (PROVENTIL HFA,VENTOLIN HFA) 90 mcg/act inhaler, Inhale 2 puffs every 6 (six) hours as needed for wheezing or shortness of breath, Disp: 1 Inhaler, Rfl: 5    Blood Glucose Monitoring Suppl (ONETOUCH VERIO) w/Device KIT, Use daily to test blood sugar 4 times per day, fasting and 2 hours after the start of each meal , Disp: 1 kit, Rfl: 0    ferrous sulfate 324 (65 Fe) mg, Take daily at bedtime With orange juice, Disp: 90 tablet, Rfl: 1    lurasidone (Latuda) 20 mg tablet, Take 20 mg by mouth daily with breakfast, Disp: , Rfl:     OneTouch Delica Lancets 43O MISC, Test blood Sugar 4 times per day, fasting and 2 hours after the start of each meal , Disp: 100 each, Rfl: 3    ONETOUCH VERIO test strip, Test blood Sugar 4 times per day, fasting and 2 hours after the start of each meal , Disp: 100 each, Rfl: 3    Prenatal Vit-Fe Fumarate-FA (PNV FOLIC ACID + IRON) 85-7 MG TABS, Take 1 tablet by mouth daily, Disp: 90 tablet, Rfl: 1    acetaminophen (TYLENOL) 650 mg CR tablet, Take 1 tablet (650 mg total) by mouth every 8 (eight) hours as needed for mild pain (pelvic pain) (Patient not taking: Reported on 2020), Disp: 30 tablet, Rfl: 0      A/P:    1  Diet controlled gestational diabetes mellitus (GDM) in third trimester  · Patient states she has followed up with diabetic educator last week  · Patient states she has been checking her blood sugars 4 times a day  · Advised patient to please bring blood sugar log at next visit for review    2  [facility]  · Follows with psychiatrist Q monthly and therapist Q weekly    3  S/P primary low transverse   · Patient would like to proceed with Caesarean section, plan to schedule    4  34 weeks gestation of pregnancy    · Tdap administered at this visit  · HIV pending  · Patient missed  appointment on 2020, counseled    20  US   30 weeks and 3 days by this ultrasound  (VIOLET=AUG 24 2020)   30 weeks and 5 days by 2nd Trimester Sono  (VIOLET=AUG 22 2020)   Vertex presentation   Fetal growth appeared normal   Regular fetal heart rate of 143 bpm   Anterior placenta  · Tubal ligation consent form previously obtained  · Follow-up in 1 week    5  Iron deficiency anemia during pregnancy  · Patient does admit to fatigue, "hard to get out of bed some days"  · Patient missed previous Venofer infusion appointment, rescheduled for this 2020  Counseled about adherence  6  History of drug use  · Follow-up UDS collected today  · Last UDS negative on 2020  · 2020 UDS positive for THC       7  Low blood hemoglobin A2  · Low Hb A2, may indicate an alpha-thalassemia or iron deficiency    · 2020 CBC hemoglobin 8 9, hematocrit 27 6, MCV 84, RDW 15 6; TIBC 511^, iron saturation 12% low, ferritin 9 low  · Plan for Venofer infusions as mentioned      Waqar Fulton,   12:09 PM

## 2020-07-15 ENCOUNTER — TELEPHONE (OUTPATIENT)
Dept: PERINATAL CARE | Facility: CLINIC | Age: 30
End: 2020-07-15

## 2020-07-15 LAB — HIV 1+2 AB+HIV1 P24 AG SERPL QL IA: NON REACTIVE

## 2020-07-15 NOTE — TELEPHONE ENCOUNTER
----- Message from Alli Jordan MA sent at 7/13/2020  1:54 PM EDT -----  Called pt note in chart  Thanks   ----- Message -----  From: Binh Strong  Sent: 7/13/2020  11:40 AM EDT  To: Alli Cazares MA    Ladies,   This patient no showed for her group virtual class 2 appointment today  Can one of you call to reschedule her class 2 ASAP thanks

## 2020-07-16 ENCOUNTER — TELEPHONE (OUTPATIENT)
Dept: PERINATAL CARE | Facility: CLINIC | Age: 30
End: 2020-07-16

## 2020-07-17 ENCOUNTER — HOSPITAL ENCOUNTER (OUTPATIENT)
Dept: INFUSION CENTER | Facility: HOSPITAL | Age: 30
Discharge: HOME | End: 2020-07-17
Attending: FAMILY MEDICINE
Payer: COMMERCIAL

## 2020-07-17 VITALS
TEMPERATURE: 97.8 F | HEART RATE: 99 BPM | RESPIRATION RATE: 19 BRPM | OXYGEN SATURATION: 98 % | SYSTOLIC BLOOD PRESSURE: 125 MMHG | DIASTOLIC BLOOD PRESSURE: 58 MMHG

## 2020-07-17 DIAGNOSIS — O99.019 IRON DEFICIENCY ANEMIA DURING PREGNANCY: Primary | ICD-10-CM

## 2020-07-17 DIAGNOSIS — D50.9 IRON DEFICIENCY ANEMIA DURING PREGNANCY: Primary | ICD-10-CM

## 2020-07-17 PROCEDURE — 96365 THER/PROPH/DIAG IV INF INIT: CPT

## 2020-07-17 RX ORDER — SODIUM CHLORIDE 9 MG/ML
20 INJECTION, SOLUTION INTRAVENOUS ONCE
Status: COMPLETED | OUTPATIENT
Start: 2020-07-17 | End: 2020-07-17

## 2020-07-17 RX ORDER — SODIUM CHLORIDE 9 MG/ML
20 INJECTION, SOLUTION INTRAVENOUS ONCE
Status: CANCELLED | OUTPATIENT
Start: 2020-07-20

## 2020-07-17 RX ADMIN — SODIUM CHLORIDE 20 ML/HR: 9 INJECTION, SOLUTION INTRAVENOUS at 15:34

## 2020-07-17 RX ADMIN — IRON SUCROSE 200 MG: 20 INJECTION, SOLUTION INTRAVENOUS at 15:34

## 2020-07-20 ENCOUNTER — HOSPITAL ENCOUNTER (OUTPATIENT)
Dept: INFUSION CENTER | Facility: HOSPITAL | Age: 30
Discharge: HOME | End: 2020-07-20
Attending: FAMILY MEDICINE
Payer: COMMERCIAL

## 2020-07-20 VITALS
HEART RATE: 104 BPM | RESPIRATION RATE: 18 BRPM | DIASTOLIC BLOOD PRESSURE: 57 MMHG | TEMPERATURE: 97.7 F | SYSTOLIC BLOOD PRESSURE: 118 MMHG | OXYGEN SATURATION: 98 %

## 2020-07-20 DIAGNOSIS — O99.019 IRON DEFICIENCY ANEMIA DURING PREGNANCY: Primary | ICD-10-CM

## 2020-07-20 DIAGNOSIS — D50.9 IRON DEFICIENCY ANEMIA DURING PREGNANCY: Primary | ICD-10-CM

## 2020-07-20 PROCEDURE — 96365 THER/PROPH/DIAG IV INF INIT: CPT

## 2020-07-20 RX ORDER — SODIUM CHLORIDE 9 MG/ML
20 INJECTION, SOLUTION INTRAVENOUS ONCE
Status: CANCELLED | OUTPATIENT
Start: 2020-07-24

## 2020-07-20 RX ORDER — SODIUM CHLORIDE 9 MG/ML
20 INJECTION, SOLUTION INTRAVENOUS ONCE
Status: COMPLETED | OUTPATIENT
Start: 2020-07-20 | End: 2020-07-20

## 2020-07-20 RX ADMIN — SODIUM CHLORIDE 20 ML/HR: 0.9 INJECTION, SOLUTION INTRAVENOUS at 15:08

## 2020-07-20 RX ADMIN — IRON SUCROSE 200 MG: 20 INJECTION, SOLUTION INTRAVENOUS at 15:10

## 2020-07-20 NOTE — PLAN OF CARE
Problem: Potential for Falls  Goal: Patient will remain free of falls  Description  INTERVENTIONS:  - Assess patient frequently for physical needs  -  Identify cognitive and physical deficits and behaviors that affect risk of falls    -  [place] fall precautions as indicated by assessment   - Educate patient/family on patient safety including physical limitations  - Instruct patient to call for assistance with activity based on assessment  - Modify environment to reduce risk of injury  - Consider OT/PT consult to assist with strengthening/mobility  Outcome: Progressing

## 2020-07-21 ENCOUNTER — TELEPHONE (OUTPATIENT)
Dept: PERINATAL CARE | Facility: CLINIC | Age: 30
End: 2020-07-21

## 2020-07-21 NOTE — TELEPHONE ENCOUNTER
I called pt to reschedule Class 2  and she said yeah, please don't call me no more, I have to much going on, I told her, I will let educator know and she hung up on me

## 2020-07-21 NOTE — TELEPHONE ENCOUNTER
----- Message from Compa Lambert sent at 7/16/2020  3:49 PM EDT -----  Regarding: Cancellation  Hi Ciera,  Yes, this lady did get your message about rescheduling the appt for Class 2  She did not connect with me at Class 2 today,  Please call her to schedule her next week for Class 2  Thanks!   Nyasia Rubalcava

## 2020-07-22 ENCOUNTER — TELEPHONE (OUTPATIENT)
Dept: PERINATAL CARE | Facility: CLINIC | Age: 30
End: 2020-07-22

## 2020-07-22 NOTE — TELEPHONE ENCOUNTER
Spoke with patient and confirmed appointment with [facility]  1 support person ( must be over age of 15) may accompany patient  Will you and your support person be able to wear a mask ,without a valve , during entire appointment? YES   [facility] does not allow cell phone use, recording device or streaming during the ultrasound  Check in and rooming questions will be done via phone, when you arrive in the parking lot please call the following inside line # prior to entering office:      [facility] [phone]      Have you or your support person traveled outside the state in the last 2 weeks? NO    If yes, what state did you travel to? Do you or your support person have:  Fever or flu- like symptoms? NO   Symptoms of upper respiratory infection like runny nose, sore throat or cough? NO   Do you have new headache that you have not had in the past? NO Have you experienced any new shortness of breath recently? NO   Do you have any new loss of taste or smell NO   Do you have any new diarrhea, nausea or vomiting? NO   Have you recently been in contact with anyone who has been sick or diagnosed with COVID-19 infection? NO   Have you been recommended to quarantine because of an exposure to a confirmed positive COVID19 person? NO   You and your support person will have temperature screening upon arrival     Patient verbalized understanding of all instructions

## 2020-07-23 ENCOUNTER — TELEPHONE (OUTPATIENT)
Dept: PERINATAL CARE | Facility: OTHER | Age: 30
End: 2020-07-23

## 2020-07-23 PROBLEM — D50.8 IRON DEFICIENCY ANEMIA SECONDARY TO INADEQUATE DIETARY IRON INTAKE: Status: RESOLVED | Noted: 2020-06-23 | Resolved: 2020-07-23

## 2020-07-23 PROBLEM — Z3A.35 35 WEEKS GESTATION OF PREGNANCY: Status: ACTIVE | Noted: 2020-04-22

## 2020-07-23 NOTE — TELEPHONE ENCOUNTER
Pt missed growth US  On chart review I see she has not been following up for diabetes education or reporting blood glucose  I called her to review but she did not answer  Message sent via [employer] to Dr Eduar Barraza who pt is seeing today  If she is non compliant or blood glucose values are elevated she needs to start antepartum fetal surveillance given increased risk of stillbirth with uncontrolled diabetes  He will let me know the outcome of today's visit    Blake Soares MD

## 2020-07-24 ENCOUNTER — HOSPITAL ENCOUNTER (OUTPATIENT)
Dept: INFUSION CENTER | Facility: HOSPITAL | Age: 30
Discharge: HOME | End: 2020-07-24
Attending: FAMILY MEDICINE
Payer: COMMERCIAL

## 2020-07-24 VITALS
DIASTOLIC BLOOD PRESSURE: 56 MMHG | HEART RATE: 102 BPM | RESPIRATION RATE: 16 BRPM | TEMPERATURE: 97.7 F | SYSTOLIC BLOOD PRESSURE: 119 MMHG

## 2020-07-24 DIAGNOSIS — O99.019 IRON DEFICIENCY ANEMIA DURING PREGNANCY: Primary | ICD-10-CM

## 2020-07-24 DIAGNOSIS — D50.9 IRON DEFICIENCY ANEMIA DURING PREGNANCY: Primary | ICD-10-CM

## 2020-07-24 PROCEDURE — 96365 THER/PROPH/DIAG IV INF INIT: CPT

## 2020-07-24 RX ORDER — SODIUM CHLORIDE 9 MG/ML
20 INJECTION, SOLUTION INTRAVENOUS ONCE
Status: CANCELLED | OUTPATIENT
Start: 2020-07-27

## 2020-07-24 RX ORDER — SODIUM CHLORIDE 9 MG/ML
20 INJECTION, SOLUTION INTRAVENOUS ONCE
Status: COMPLETED | OUTPATIENT
Start: 2020-07-24 | End: 2020-07-24

## 2020-07-24 RX ADMIN — IRON SUCROSE 200 MG: 20 INJECTION, SOLUTION INTRAVENOUS at 15:21

## 2020-07-24 RX ADMIN — SODIUM CHLORIDE 20 ML/HR: 9 INJECTION, SOLUTION INTRAVENOUS at 15:20

## 2020-07-27 ENCOUNTER — HOSPITAL ENCOUNTER (OUTPATIENT)
Dept: INFUSION CENTER | Facility: HOSPITAL | Age: 30
Discharge: HOME | End: 2020-07-27
Attending: FAMILY MEDICINE
Payer: COMMERCIAL

## 2020-07-27 VITALS
OXYGEN SATURATION: 98 % | DIASTOLIC BLOOD PRESSURE: 63 MMHG | HEART RATE: 108 BPM | RESPIRATION RATE: 18 BRPM | TEMPERATURE: 97.8 F | SYSTOLIC BLOOD PRESSURE: 131 MMHG

## 2020-07-27 DIAGNOSIS — O99.019 IRON DEFICIENCY ANEMIA DURING PREGNANCY: Primary | ICD-10-CM

## 2020-07-27 DIAGNOSIS — D50.9 IRON DEFICIENCY ANEMIA DURING PREGNANCY: Primary | ICD-10-CM

## 2020-07-27 PROCEDURE — 96365 THER/PROPH/DIAG IV INF INIT: CPT

## 2020-07-27 RX ORDER — SODIUM CHLORIDE 9 MG/ML
20 INJECTION, SOLUTION INTRAVENOUS ONCE
Status: COMPLETED | OUTPATIENT
Start: 2020-07-27 | End: 2020-07-27

## 2020-07-27 RX ORDER — SODIUM CHLORIDE 9 MG/ML
20 INJECTION, SOLUTION INTRAVENOUS ONCE
Status: CANCELLED | OUTPATIENT
Start: 2020-07-31

## 2020-07-27 RX ADMIN — IRON SUCROSE 200 MG: 20 INJECTION, SOLUTION INTRAVENOUS at 14:01

## 2020-07-27 RX ADMIN — SODIUM CHLORIDE 20 ML/HR: 9 INJECTION, SOLUTION INTRAVENOUS at 14:01

## 2020-07-27 NOTE — PLAN OF CARE
Problem: Potential for Falls  Goal: Patient will remain free of falls  Description  INTERVENTIONS:  - Assess patient frequently for physical needs  -  Identify cognitive and physical deficits and behaviors that affect risk of falls    -  [place] fall precautions as indicated by assessment   - Educate patient/family on patient safety including physical limitations  - Instruct patient to call for assistance with activity based on assessment  - Modify environment to reduce risk of injury  Outcome: Progressing

## 2020-07-28 ENCOUNTER — TELEPHONE (OUTPATIENT)
Dept: PERINATAL CARE | Facility: CLINIC | Age: 30
End: 2020-07-28

## 2020-07-30 ENCOUNTER — TELEPHONE (OUTPATIENT)
Dept: FAMILY MEDICINE CLINIC | Facility: CLINIC | Age: 30
End: 2020-07-30

## 2020-07-30 NOTE — TELEPHONE ENCOUNTER
Call placed to Karen Nguyen today in regards to her missed appointment today  Patient states " I dont feel well so I did not come in"  Patient missed the last two weeks of OB appointments  Patient states she is nauseated  Pt denies any LOF, bleeding, ctxs, & positive fetal movement  @ this time  Patient states she is checking her blood sugars   I encouraged patient to F/U with the  center diabetic educator and follow their instructions  Heriberto Rocha states "it is to much I am stressed"  I reiterated the importance of prenatal care & going to  center etc   Patient verbalizes understanding   Encouraged patient to come today for a later  appointment since she stated she is not feeling well but pt refused this offer  Reviewed S/S of labor , fetal kick counts, checking blood sugars, F/U with  & set up an appt for Azalea for 2020 @ 9 am OB check  Patient understands to call & head to [facility] for any decrease in FM, Bleeding, ctxs, abdominal pain, LOF, headache  , fever , vomiting

## 2020-07-31 ENCOUNTER — HOSPITAL ENCOUNTER (OUTPATIENT)
Dept: INFUSION CENTER | Facility: HOSPITAL | Age: 30
Discharge: HOME | End: 2020-07-31
Attending: FAMILY MEDICINE
Payer: COMMERCIAL

## 2020-07-31 VITALS
DIASTOLIC BLOOD PRESSURE: 64 MMHG | HEART RATE: 92 BPM | SYSTOLIC BLOOD PRESSURE: 120 MMHG | OXYGEN SATURATION: 97 % | TEMPERATURE: 97.8 F

## 2020-07-31 DIAGNOSIS — D50.9 IRON DEFICIENCY ANEMIA DURING PREGNANCY: Primary | ICD-10-CM

## 2020-07-31 DIAGNOSIS — O99.019 IRON DEFICIENCY ANEMIA DURING PREGNANCY: Primary | ICD-10-CM

## 2020-07-31 PROCEDURE — 96365 THER/PROPH/DIAG IV INF INIT: CPT

## 2020-07-31 RX ORDER — SODIUM CHLORIDE 9 MG/ML
20 INJECTION, SOLUTION INTRAVENOUS ONCE
Status: COMPLETED | OUTPATIENT
Start: 2020-07-31 | End: 2020-07-31

## 2020-07-31 RX ORDER — SODIUM CHLORIDE 9 MG/ML
20 INJECTION, SOLUTION INTRAVENOUS ONCE
Status: CANCELLED | OUTPATIENT
Start: 2020-08-02

## 2020-07-31 RX ADMIN — SODIUM CHLORIDE 20 ML/HR: 9 INJECTION, SOLUTION INTRAVENOUS at 11:21

## 2020-07-31 RX ADMIN — IRON SUCROSE 200 MG: 20 INJECTION, SOLUTION INTRAVENOUS at 11:21

## 2020-08-03 ENCOUNTER — HOSPITAL ENCOUNTER (OUTPATIENT)
Dept: INFUSION CENTER | Facility: HOSPITAL | Age: 30
Discharge: HOME | End: 2020-08-03
Attending: FAMILY MEDICINE

## 2020-08-04 ENCOUNTER — TELEPHONE (OUTPATIENT)
Dept: FAMILY MEDICINE CLINIC | Facility: CLINIC | Age: 30
End: 2020-08-04

## 2020-08-04 ENCOUNTER — HOSPITAL ENCOUNTER (OUTPATIENT)
Dept: INFUSION CENTER | Facility: HOSPITAL | Age: 30
Discharge: HOME | End: 2020-08-04
Attending: FAMILY MEDICINE
Payer: COMMERCIAL

## 2020-08-04 VITALS
DIASTOLIC BLOOD PRESSURE: 69 MMHG | RESPIRATION RATE: 16 BRPM | TEMPERATURE: 97.2 F | OXYGEN SATURATION: 98 % | SYSTOLIC BLOOD PRESSURE: 119 MMHG | HEART RATE: 94 BPM

## 2020-08-04 DIAGNOSIS — O99.019 IRON DEFICIENCY ANEMIA DURING PREGNANCY: Primary | ICD-10-CM

## 2020-08-04 DIAGNOSIS — D50.9 IRON DEFICIENCY ANEMIA DURING PREGNANCY: Primary | ICD-10-CM

## 2020-08-04 PROCEDURE — 96365 THER/PROPH/DIAG IV INF INIT: CPT

## 2020-08-04 RX ORDER — SODIUM CHLORIDE 9 MG/ML
20 INJECTION, SOLUTION INTRAVENOUS ONCE
Status: COMPLETED | OUTPATIENT
Start: 2020-08-04 | End: 2020-08-04

## 2020-08-04 RX ORDER — SODIUM CHLORIDE 9 MG/ML
20 INJECTION, SOLUTION INTRAVENOUS ONCE
Status: CANCELLED | OUTPATIENT
Start: 2020-08-06

## 2020-08-04 RX ADMIN — SODIUM CHLORIDE 20 ML/HR: 9 INJECTION, SOLUTION INTRAVENOUS at 15:34

## 2020-08-04 RX ADMIN — IRON SUCROSE 200 MG: 20 INJECTION, SOLUTION INTRAVENOUS at 15:34

## 2020-08-04 NOTE — PLAN OF CARE
Problem: Potential for Falls  Goal: Patient will remain free of falls  Description: INTERVENTIONS:  - Assess patient frequently for physical needs  -  Identify cognitive and physical deficits and behaviors that affect risk of falls  -  [place] fall precautions as indicated by assessment   - Educate patient/family on patient safety including physical limitations  - Instruct patient to call for assistance with activity based on assessment  - Modify environment to reduce risk of injury  - Consider OT/PT consult to assist with strengthening/mobility  Outcome: Progressing     Problem: Potential for Falls  Goal: Patient will remain free of falls  Description: INTERVENTIONS:  - Assess patient frequently for physical needs  -  Identify cognitive and physical deficits and behaviors that affect risk of falls    -  [place] fall precautions as indicated by assessment   - Educate patient/family on patient safety including physical limitations  - Instruct patient to call for assistance with activity based on assessment  - Modify environment to reduce risk of injury  - Consider OT/PT consult to assist with strengthening/mobility  Outcome: Progressing
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
## 2020-08-05 ENCOUNTER — TELEPHONE (OUTPATIENT)
Dept: FAMILY MEDICINE CLINIC | Facility: CLINIC | Age: 30
End: 2020-08-05

## 2020-08-05 NOTE — TELEPHONE ENCOUNTER
Call placed to [employer] today in regards to Azalea's non compliance with prenatal care during her high risk pregnancy & also her 5 postive drug screens  Spoke with [facility]PS worker and this patient has an open case with [address]  [address] will be notified of my call

## 2020-08-07 ENCOUNTER — TELEPHONE (OUTPATIENT)
Dept: PERINATAL CARE | Facility: OTHER | Age: 30
End: 2020-08-07

## 2020-08-07 ENCOUNTER — HOSPITAL ENCOUNTER (OUTPATIENT)
Facility: HOSPITAL | Age: 30
Setting detail: OBSERVATION
Discharge: HOME | End: 2020-08-07
Attending: FAMILY MEDICINE | Admitting: OBSTETRICS & GYNECOLOGY
Payer: COMMERCIAL

## 2020-08-07 ENCOUNTER — HOSPITAL ENCOUNTER (OUTPATIENT)
Dept: INFUSION CENTER | Facility: HOSPITAL | Age: 30
Discharge: HOME | End: 2020-08-07
Attending: FAMILY MEDICINE

## 2020-08-07 ENCOUNTER — ROUTINE PRENATAL (OUTPATIENT)
Dept: FAMILY MEDICINE CLINIC | Facility: CLINIC | Age: 30
End: 2020-08-07
Payer: COMMERCIAL

## 2020-08-07 VITALS
DIASTOLIC BLOOD PRESSURE: 65 MMHG | HEART RATE: 87 BPM | SYSTOLIC BLOOD PRESSURE: 116 MMHG | RESPIRATION RATE: 18 BRPM | TEMPERATURE: 98.3 F

## 2020-08-07 VITALS — WEIGHT: 187 LBS | DIASTOLIC BLOOD PRESSURE: 64 MMHG | SYSTOLIC BLOOD PRESSURE: 104 MMHG | BODY MASS INDEX: 34.2 KG/M2

## 2020-08-07 DIAGNOSIS — D50.9 IRON DEFICIENCY ANEMIA DURING PREGNANCY: ICD-10-CM

## 2020-08-07 DIAGNOSIS — Z87.898 HISTORY OF DRUG USE: ICD-10-CM

## 2020-08-07 DIAGNOSIS — O24.410 DIET CONTROLLED GESTATIONAL DIABETES MELLITUS (GDM) IN THIRD TRIMESTER: ICD-10-CM

## 2020-08-07 DIAGNOSIS — F31.9 BIPOLAR 1 DISORDER (HCC): ICD-10-CM

## 2020-08-07 DIAGNOSIS — O99.019 IRON DEFICIENCY ANEMIA DURING PREGNANCY: ICD-10-CM

## 2020-08-07 DIAGNOSIS — Z3A.37 37 WEEKS GESTATION OF PREGNANCY: Primary | ICD-10-CM

## 2020-08-07 DIAGNOSIS — D64.9 LOW BLOOD HEMOGLOBIN A2: ICD-10-CM

## 2020-08-07 PROBLEM — R30.0 DYSURIA: Status: RESOLVED | Noted: 2019-08-07 | Resolved: 2020-08-07

## 2020-08-07 LAB
ALBUMIN SERPL BCP-MCNC: 2.7 G/DL (ref 3.5–5)
ALP SERPL-CCNC: 110 U/L (ref 46–116)
ALT SERPL W P-5'-P-CCNC: 17 U/L (ref 12–78)
AMPHETAMINES SERPL QL SCN: NEGATIVE
ANION GAP SERPL CALCULATED.3IONS-SCNC: 10 MMOL/L (ref 4–13)
AST SERPL W P-5'-P-CCNC: 20 U/L (ref 5–45)
BARBITURATES UR QL: NEGATIVE
BASOPHILS # BLD AUTO: 0.03 THOUSANDS/ΜL (ref 0–0.1)
BASOPHILS NFR BLD AUTO: 0 % (ref 0–1)
BENZODIAZ UR QL: NEGATIVE
BILIRUB SERPL-MCNC: 0.58 MG/DL (ref 0.2–1)
BUN SERPL-MCNC: 4 MG/DL (ref 5–25)
CALCIUM SERPL-MCNC: 8.4 MG/DL (ref 8.3–10.1)
CHLORIDE SERPL-SCNC: 103 MMOL/L (ref 100–108)
CO2 SERPL-SCNC: 22 MMOL/L (ref 21–32)
COCAINE UR QL: NEGATIVE
CREAT SERPL-MCNC: 0.56 MG/DL (ref 0.6–1.3)
EOSINOPHIL # BLD AUTO: 0.03 THOUSAND/ΜL (ref 0–0.61)
EOSINOPHIL NFR BLD AUTO: 0 % (ref 0–6)
ERYTHROCYTE [DISTWIDTH] IN BLOOD BY AUTOMATED COUNT: 21.7 % (ref 11.6–15.1)
GFR SERPL CREATININE-BSD FRML MDRD: 147 ML/MIN/1.73SQ M
GLUCOSE P FAST SERPL-MCNC: 67 MG/DL (ref 65–99)
GLUCOSE SERPL-MCNC: 63 MG/DL (ref 65–140)
GLUCOSE SERPL-MCNC: 67 MG/DL (ref 65–140)
HCT VFR BLD AUTO: 35.1 % (ref 34.8–46.1)
HGB BLD-MCNC: 11 G/DL (ref 11.5–15.4)
IMM GRANULOCYTES # BLD AUTO: 0.03 THOUSAND/UL (ref 0–0.2)
IMM GRANULOCYTES NFR BLD AUTO: 0 % (ref 0–2)
LYMPHOCYTES # BLD AUTO: 1.82 THOUSANDS/ΜL (ref 0.6–4.47)
LYMPHOCYTES NFR BLD AUTO: 24 % (ref 14–44)
MCH RBC QN AUTO: 29.5 PG (ref 26.8–34.3)
MCHC RBC AUTO-ENTMCNC: 31.3 G/DL (ref 31.4–37.4)
MCV RBC AUTO: 94 FL (ref 82–98)
METHADONE UR QL: NEGATIVE
MONOCYTES # BLD AUTO: 0.49 THOUSAND/ΜL (ref 0.17–1.22)
MONOCYTES NFR BLD AUTO: 7 % (ref 4–12)
NEUTROPHILS # BLD AUTO: 5.05 THOUSANDS/ΜL (ref 1.85–7.62)
NEUTS SEG NFR BLD AUTO: 69 % (ref 43–75)
NRBC BLD AUTO-RTO: 0 /100 WBCS
OPIATES UR QL SCN: NEGATIVE
OXYCODONE+OXYMORPHONE UR QL SCN: NEGATIVE
PCP UR QL: NEGATIVE
PLATELET # BLD AUTO: 206 THOUSANDS/UL (ref 149–390)
PMV BLD AUTO: 10.1 FL (ref 8.9–12.7)
POTASSIUM SERPL-SCNC: 3.5 MMOL/L (ref 3.5–5.3)
PROT SERPL-MCNC: 6.5 G/DL (ref 6.4–8.2)
RBC # BLD AUTO: 3.73 MILLION/UL (ref 3.81–5.12)
SL AMB  POCT GLUCOSE, UA: NORMAL
SL AMB POCT URINE PROTEIN: NORMAL
SODIUM SERPL-SCNC: 135 MMOL/L (ref 136–145)
THC UR QL: NEGATIVE
WBC # BLD AUTO: 7.45 THOUSAND/UL (ref 4.31–10.16)

## 2020-08-07 PROCEDURE — 82948 REAGENT STRIP/BLOOD GLUCOSE: CPT

## 2020-08-07 PROCEDURE — 80307 DRUG TEST PRSMV CHEM ANLYZR: CPT | Performed by: GENERAL PRACTICE

## 2020-08-07 PROCEDURE — 99213 OFFICE O/P EST LOW 20 MIN: CPT

## 2020-08-07 PROCEDURE — 81002 URINALYSIS NONAUTO W/O SCOPE: CPT | Performed by: FAMILY MEDICINE

## 2020-08-07 PROCEDURE — NC001 PR NO CHARGE: Performed by: STUDENT IN AN ORGANIZED HEALTH CARE EDUCATION/TRAINING PROGRAM

## 2020-08-07 PROCEDURE — 1036F TOBACCO NON-USER: CPT | Performed by: FAMILY MEDICINE

## 2020-08-07 PROCEDURE — 96360 HYDRATION IV INFUSION INIT: CPT

## 2020-08-07 PROCEDURE — 99213 OFFICE O/P EST LOW 20 MIN: CPT | Performed by: FAMILY MEDICINE

## 2020-08-07 PROCEDURE — NC001 PR NO CHARGE: Performed by: GENERAL PRACTICE

## 2020-08-07 PROCEDURE — 85025 COMPLETE CBC W/AUTO DIFF WBC: CPT | Performed by: GENERAL PRACTICE

## 2020-08-07 PROCEDURE — 96365 THER/PROPH/DIAG IV INF INIT: CPT

## 2020-08-07 PROCEDURE — 76818 FETAL BIOPHYS PROFILE W/NST: CPT | Performed by: STUDENT IN AN ORGANIZED HEALTH CARE EDUCATION/TRAINING PROGRAM

## 2020-08-07 PROCEDURE — 80053 COMPREHEN METABOLIC PANEL: CPT | Performed by: GENERAL PRACTICE

## 2020-08-07 RX ADMIN — IRON SUCROSE 200 MG: 20 INJECTION, SOLUTION INTRAVENOUS at 15:30

## 2020-08-07 NOTE — DISCHARGE INSTRUCTIONS
Early Labor Signs   GENERAL INFORMATION:   What are early labor signs? Early labor signs are changes in your body that allow your baby to pass through your birth canal   What are the signs and symptoms of early labor? · Lightening  occurs when your baby drops inside your pelvis  You may feel increased pressure in your pelvis  This may happen a few weeks to a few hours before your labor begins  · Contractions  are cramps and tightening that occur in your uterus to help move the baby through your birth canal  Contractions occur regularly and more often each time  Each one lasts about 30 to 70 seconds, and gets stronger until you deliver your baby  Contractions do not go away with movement  The pain starts in your lower back and moves to your abdomen  · Effacement  occurs when your cervix softens and thins, so it can easily open for the baby  Your caregiver will examine your cervix for effacement  · Dilation  is widening of your cervix, also for the baby's passage  Your caregiver will examine your cervix for dilation  Your cervix will be fully opened and ready for delivery when it is dilated to 10 centimeters  · Increased discharge  from your vagina may occur  It may be pink, clear, or slightly bloody  This discharge may also be called bloody show  Bloody show is a mucus plug that forms and blocks your cervix during pregnancy  · Rupture of membranes  is a sudden release of clear fluid from your vagina  It is also known as when your water breaks  Your caregiver may need to break your water if it does not break on its own  What is false labor? You may have false labor signs, which are also called Winston Fleming contractions  False labor is common and may happen several weeks or days before your actual labor  The contractions are not regular, and do not get closer together  The pain is usually mild, does not worsen, and is felt only in front   Artie Fleming contractions may happen later in the day, and stop after you change position, walk, or rest   When should I contact my caregiver? · You have pain in your lower back or abdomen  · You have bloody mucus or show  · You have questions or concerns about your condition or care  When should I seek immediate care or call 911? · You have regular, painful contractions that are less than 5 minutes apart and last 30 to 70 seconds each  · You have heavy vaginal bleeding  · You have a constant trickle or sudden gush of clear fluid from your vagina  · You notice a sudden decrease in your baby's movement  CARE AGREEMENT:   You have the right to help plan your care  Learn about your health condition and how it may be treated  Discuss treatment options with your caregivers to decide what care you want to receive  You always have the right to refuse treatment  The above information is an  only  It is not intended as medical advice for individual conditions or treatments  Talk to your doctor, nurse or pharmacist before following any medical regimen to see if it is safe and effective for you  © 2014 [address] is for End User's use only and may not be sold, redistributed or otherwise used for commercial purposes  All illustrations and images included in CareNotes® are the copyrighted property of [employer]  or [employer]  Pregnancy at [address] to [address]:   What changes are happening in my body? You are considered full term at the beginning of 37 weeks  Your breathing may be easier if your baby has moved down into a head-down position  You may need to urinate more often because the baby may be pressing on your bladder  You may also feel more discomfort and get tired easily  How do I care for myself at this stage of my pregnancy? · Eat a variety of healthy foods    Healthy foods include fruits, vegetables, whole-grain breads, low-fat dairy foods, beans, lean meats, and fish  Drink liquids as directed  Ask how much liquid to drink each day and which liquids are best for you  Limit caffeine to less than 200 milligrams each day  Limit your intake of fish to 2 servings each week  Choose fish low in mercury such as canned light tuna, shrimp, salmon, cod, or tilapia  Do not  eat fish high in mercury such as swordfish, tilefish, anthony mackerel, and shark  · Take prenatal vitamins as directed  Your need for certain vitamins and minerals, such as folic acid, increases during pregnancy  Prenatal vitamins provide some of the extra vitamins and minerals you need  Prenatal vitamins may also help to decrease the risk of certain birth defects  · Rest as needed  Put your feet up if you have swelling in your ankles and feet  · Do not smoke  If you smoke, it is never too late to quit  Smoking increases your risk of a miscarriage and other health problems during your pregnancy  Smoking can cause your baby to be born too early or weigh less at birth  Ask your healthcare provider for information if you need help quitting  · Do not drink alcohol  Alcohol passes from your body to your baby through the placenta  It can affect your baby's brain development and cause fetal alcohol syndrome (FAS)  FAS is a group of conditions that causes mental, behavior, and growth problems  · Talk to your healthcare provider before you take any medicines  Many medicines may harm your baby if you take them when you are pregnant  Do not take any medicines, vitamins, herbs, or supplements without first talking to your healthcare provider  Never use illegal or street drugs (such as marijuana or cocaine) while you are pregnant  · Talk to your healthcare provider before you travel  You may not be able to travel in an airplane after 36 weeks  He may also recommend that you avoid long road trips  What are some safety tips during pregnancy? · Avoid hot tubs and saunas    Do not use a hot tub or sauna while you are pregnant, especially during your first trimester  Hot tubs and saunas may raise your baby's temperature and increase the risk of birth defects  · Avoid toxoplasmosis  This is an infection caused by eating raw meat or being around infected cat feces  It can cause birth defects, miscarriages, and other problems  Wash your hands after you touch raw meat  Make sure any meat is well-cooked before you eat it  Avoid raw eggs and unpasteurized milk  Use gloves or ask someone else to clean your cat's litter box while you are pregnant  · Ask your healthcare provider about travel  The most comfortable time to travel is during the second trimester  Ask your healthcare provider if you can travel after 36 weeks  You may not be able to travel in an airplane after 36 weeks  He may also recommend that you avoid long road trips  What changes are happening with my baby? By 38 weeks, your baby may weigh between 6 and 9 pounds  Your baby may be about 14 inches long from the top of the head to the rump (baby's bottom)  Your baby hears well enough to know your voice  As your baby gets larger, you may feel fewer kicks and more stretching and rolling  Your baby may move into a head-down position  Your baby will also rest lower in your abdomen  What do I need to know about prenatal care? Your healthcare provider will check your blood pressure and weight  You may also need the following:  · A urine test  may also be done to check for sugar and protein  These can be signs of gestational diabetes or infection  Protein in your urine may also be a sign of preeclampsia  Preeclampsia is a condition that can develop during week 20 or later of your pregnancy  It causes high blood pressure, and it can cause problems with your kidneys and other organs  · A blood test  may be done to check for anemia (low iron level)  · A Tdap vaccine  may be recommended by your healthcare provider       · A group B strep test  is a test that is done to check for group B strep infection  Group B strep is a type of bacteria that may be found in the vagina or rectum  It can be passed to your baby during delivery if you have it  Your healthcare provider will take swab your vagina or rectum and send the sample to the lab for tests  · Fundal height  is a measurement of your uterus to check your baby's growth  This number is usually the same as the number of weeks that you have been pregnant  Your healthcare provider may also check your baby's position  · Your baby's heart rate  will be checked  When should I seek immediate care? · You develop a severe headache that does not go away  · You have new or increased vision changes, such as blurred or spotted vision  · You have new or increased swelling in your face or hands  · You have vaginal spotting or bleeding  · Your water broke or you feel warm water gushing or trickling from your vagina  When should I contact my healthcare provider? · You have more than 5 contractions in 1 hour  · You notice any changes in your baby's movements  · You have abdominal cramps, pressure, or tightening  · You have a change in vaginal discharge  · You have chills or a fever  · You have vaginal itching, burning, or pain  · You have yellow, green, white, or foul-smelling vaginal discharge  · You have pain or burning when you urinate, less urine than usual, or pink or bloody urine  · You have questions or concerns about your condition or care  CARE AGREEMENT:   You have the right to help plan your care  Learn about your health condition and how it may be treated  Discuss treatment options with your caregivers to decide what care you want to receive  You always have the right to refuse treatment  The above information is an  only  It is not intended as medical advice for individual conditions or treatments   Talk to your doctor, nurse or pharmacist before following any medical regimen to see if it is safe and effective for you  © 2017 [address] Information is for End User's use only and may not be sold, redistributed or otherwise used for commercial purposes  All illustrations and images included in CareNotes® are the copyrighted property of [employer]  or [employer]  Tubal Ligation   AMBULATORY CARE:   What you need to know about a tubal ligation:  A tubal ligation is surgery to close your fallopian tubes to prevent pregnancy  How to prepare for a tubal ligation:  Your healthcare provider will talk to you about how to prepare for surgery  He may tell you not to eat or drink anything within 8 hours before your surgery  He will tell you what medicines to take or not take on the day of your surgery  Arrange for someone to drive you home and stay with you after surgery  What will happen during a tubal ligation:   · You may be given general anesthesia to keep you asleep and free from pain during surgery  You may instead be given regional anesthesia or local anesthesia  Regional anesthesia numbs the lower part of your body  Local anesthesia numbs the surgery area  With local anesthesia, you may still feel pressure or pushing during surgery, but you should not feel any pain  You may have a minilaparotomy or laparoscopic tubal ligation  During a minilaparotomy, your surgeon will make a small incision in your lower abdomen  · During laparoscopic tubal ligation, your surgeon will make one or more small incisions in your abdomen  A laparoscope and other instruments will be put into your abdomen through the small incisions  The laparoscope is a long metal tube with a light and camera on the end  Your abdomen will be filled with a gas  This allows your surgeon to see inside your abdomen more clearly  Your fallopian tubes will be cut and closed with thread   Your healthcare provider may instead seal your tubes with heat, or with a clip or ring  After the surgery is done, your incisions are closed with stitches or staples  The incisions may be covered with a bandage  What will happen after a tubal ligation:  You will have abdominal pain and cramps for the first few days after surgery  You may feel dizzy, nauseated, bloated, or have gas  You may also have pain in your shoulders or near your ribs if gas was put in your abdomen  Risks of a tubal ligation:  You may bleed more than expected or get an infection  Blood vessels or organs such as your bowel or bladder could be injured during surgery  Although pregnancy is unlikely after a tubal ligation, there is still a small chance that you may get pregnant  If pregnancy does occur, there is an increased risk for an ectopic pregnancy (tubal pregnancy)  You may get a blood clot in your leg or arm  This may become life-threatening  Call 911 for any of the following:   · You cough up blood  · You feel lightheaded, short of breath, and have chest pain  Seek care immediately if:   · Your arm or leg feels warm, tender, and painful  It may look swollen and red  · You have severe abdominal pain  Contact your surgeon or gynecologist if:   · You have a fever  · You have heavy bleeding from your incisions  · Your stitches or staples come apart  · You have trouble urinating, burning when you urinate, or bloody urine  · Your incision is swollen, red, or has pus coming from it  · You have questions or concerns about your condition or care  Medicines: You may need any of the following:  · Prescription pain medicine  may be given  Ask how to take this medicine safely  · Acetaminophen  decreases pain  It is available without a doctor's order  Ask how much to take and how often to take it  Follow directions  Acetaminophen can cause liver damage if not taken correctly  · NSAIDs , such as ibuprofen, help decrease swelling, pain, and fever   NSAIDs can cause stomach bleeding or kidney problems in certain people  If you take blood thinner medicine, always ask your healthcare provider if NSAIDs are safe for you  Always read the medicine label and follow directions  · Take your medicine as directed  Contact your healthcare provider if you think your medicine is not helping or if you have side effects  Tell him or her if you are allergic to any medicine  Keep a list of the medicines, vitamins, and herbs you take  Include the amounts, and when and why you take them  Bring the list or the pill bottles to follow-up visits  Carry your medicine list with you in case of an emergency  Activity:   · Avoid strenuous activities  such as lifting heavy objects and intense exercise for 7 days after your surgery  Ask when it is safe for you to drive, return to work, and return to other regular activities  · Do not strain during bowel movements  High-fiber foods and extra liquids can help you prevent constipation  Examples of high-fiber foods are fruit and bran  Prune juice and water are good liquids to drink  · Do not have sex  for at least 1 week  If your tubal ligation surgery was done after you had a baby, you will need to wait longer  Ask your healthcare provider when you can have sex  · Do not go into a bath or hot tub  for 10 days or as directed  Take a shower instead of taking a bath  Wound care:  Care for your wound as directed  Carefully wash the wound with soap and water  Dry the area and put on new, clean bandages as directed  Change your bandages when they get wet or dirty  Follow up with your healthcare provider within 7 to 10 days or as directed:  Write down your questions so you remember to ask them during your visits  © 2017 [address]  Information is for End User's use only and may not be sold, redistributed or otherwise used for commercial purposes   All illustrations and images included in CareNotes® are the copyrighted property of [employer] D [employer] M [employer]  or [employer]  The above information is an  only  It is not intended as medical advice for individual conditions or treatments  Talk to your doctor, nurse or pharmacist before following any medical regimen to see if it is safe and effective for you  Gestational Diabetes   WHAT YOU NEED TO KNOW:   Gestational diabetes (GDM) is a type of diabetes that develops during pregnancy, usually in the second or third trimester  GDM causes your blood sugar level to rise too high  This can harm you and your unborn baby  Blood sugar levels usually go back to normal after you give birth  DISCHARGE INSTRUCTIONS:   Seek care immediately if:   · Your heartbeat is fast and weak, or your breathing is fast and shallow  · You are more sleepy than usual or become confused  · You have blurred or double vision  · Your breath has a fruity, sweet smell  · You are shaking or sweating  Contact your healthcare provider if:   · Your blood sugar level is below 70 mg/dL or above 250 mg/dL and does not improve with treatment  · You have a headache, or you are dizzy  · You think your baby is not moving as much as usual     · You have more hunger or thirst than usual      · You are urinating more often than usual      · You have an upset stomach and are vomiting  · You have questions or concerns about your condition or care  Medicines:   · Insulin  may be needed if your diabetes is not controlled by nutrition and exercise  Insulin is safe to use during pregnancy  · Take your medicine as directed  Contact your healthcare provider if you think your medicine is not helping or if you have side effects  Tell him or her if you are allergic to any medicine  Keep a list of the medicines, vitamins, and herbs you take  Include the amounts, and when and why you take them  Bring the list or the pill bottles to follow-up visits  Carry your medicine list with you in case of an emergency    Check your blood sugar level as directed:   · You will be taught how to check a small drop of blood in a glucose monitor  Ask your healthcare provider when and how often to check your blood sugar level during the day  You may need to check your blood sugar level at least 3 times each day  · Ask your healthcare provider what your blood sugar levels should be before and after you eat  He may suggest that your blood sugar level should be at or below 95 mg/dL before you eat  The level may need to be at or below 140 mg/dL 1 hour after you eat or at or below 120 mg/dL 2 hours after you eat  Write down your results, and show them to your healthcare provider  He may use the results to make changes to your medicine, food, and exercise schedules  Check your blood pressure often:  High blood pressure can cause problems with your health and your pregnancy  Blood pressure readings are usually written as 2 numbers  Your systolic blood pressure (the first number) should be between 110 and 129  Your diastolic blood pressure (the second number) should be between 65 and 79  Follow your meal plan as directed:  Talk to a dietitian or healthcare provider about the best meal plan for you  She may recommend that you eat 3 small meals and 2 to 4 snacks every day  Control the amount of carbohydrates (such as bread, cereal, and fruit) you eat at each meal and snack  Too much carbohydrate in 1 meal or snack can cause your blood sugar to rise to a high level  Your dietitian or healthcare provider will tell you how much carbohydrate to eat at each meal and snack  Eat foods that are a good source of fiber, such as vegetables and legumes (beans and lentils)  Maintain a healthy weight:  Ask your healthcare provider how much you should weigh  A healthy weight can help you control your GDM  Ask him to help you create a weight loss plan if you are overweight     Ask your healthcare provider about the best exercise plan for you:  Exercise helps keep your blood sugar level steady  A good goal is to exercise for at least 30 minutes, 5 days a week  Low-impact exercises such as walking or swimming are effective  Do not smoke:  Nicotine is dangerous for you and your baby and can make it harder to manage your GDM  Do not use e-cigarettes or smokeless tobacco in place of cigarettes or to help you quit  They still contain nicotine  Ask your healthcare provider for information if you currently smoke and need help quitting  Follow up with your healthcare provider as directed: You will need to have screening tests for diabetes 4 to 12 weeks after you have your baby  You may also need to have tests for diabetes every 3 years for life  Write down your questions so you remember to ask them during your visits  © 2017 [address] [facility] Information is for End User's use only and may not be sold, redistributed or otherwise used for commercial purposes  All illustrations and images included in CareNotes® are the copyrighted property of [employer]  or [employer]  The above information is an  only  It is not intended as medical advice for individual conditions or treatments  Talk to your doctor, nurse or pharmacist before following any medical regimen to see if it is safe and effective for you  Iron Deficiency Anemia   AMBULATORY CARE:   Iron deficiency anemia  (FLEX) is low levels of red blood cells and hemoglobin caused by a lack of iron in the blood  Iron helps make hemoglobin  Hemoglobin is part of your red blood cell and helps carry oxygen to your body  The most common causes are blood loss and not enough iron in the foods you eat     Common symptoms include the following:   · Weakness and tiredness    · Shortness of breath with activity    · Fast heartbeat or dizziness    · Headaches or trouble concentrating    · Pale skin    · Sore or swollen tongue and mouth    · Nails that break easily    · An urge to eat ice, paint, starch, or dirt  Call 911 for any of the following:   · You have shortness of breath, even when you rest     Seek care immediately if:   · You have dark or bloody bowel movements  · You are too dizzy to stand up  · You have trouble swallowing because of the pain in your mouth and throat  Contact your healthcare provider if:   · You have heartburn, constipation, or diarrhea  · You have nausea or are vomiting  · You are dizzy or very tired  · You have questions or concerns about your condition or care  Treatment for anemia  may include any of the following:  · Iron or folic acid supplements  will help increase your red blood cell and hemoglobin levels  · Bowel movement softeners  may be needed if the iron supplements cause constipation  Eat foods rich in iron and protein:  Nuts, meat, dark leafy green vegetables, and beans are high in iron and protein  Do not drink coffee, tea, or other liquids with caffeine  Limit milk to 2 cups a day  You may need to meet with a dietitian to create the right food plan for you  Drink liquids as directed:  Liquids help prevent constipation  Ask how much liquid to drink each day and which liquids are best for you  Follow up with your healthcare provider as directed:  Write down your questions so you remember to ask them during your visits  © 2017 [address] Information is for End User's use only and may not be sold, redistributed or otherwise used for commercial purposes  All illustrations and images included in CareNotes® are the copyrighted property of [employer]  or [employer]  The above information is an  only  It is not intended as medical advice for individual conditions or treatments  Talk to your doctor, nurse or pharmacist before following any medical regimen to see if it is safe and effective for you

## 2020-08-07 NOTE — TELEPHONE ENCOUNTER
Dr Leyda Aparicio called to discuss her care  NST in office shows variables in office  Current GA 37w6d  Prior , noncompliant with recommended GDM education/testing  On IV iron  Advised sending to triage for extended monitoring  Venofer can be administered once fetal evaluation completed and disposition determined, as triage evaluation may result in staying for delivery  L&D team notified      Shaun Kaiser MD

## 2020-08-07 NOTE — PROGRESS NOTES
Call received from Dr Kaylah Augustine to cancel pt's Venofer infusion today due to complications with pregnancy

## 2020-08-07 NOTE — PROCEDURES
Parmjit Cagle, a [id number] at 37w6d with an VIOLET of 2020, by Ultrasound, was seen at [address] for the following procedure(s): $Procedure Type: BPP with NST]       BPP performed due to one late deceleration vs  Prolonged decleration in 2 hours of monitoring, all of her other monitoring was reeactive       Biophysical Profile  Fetal Breathin  Fetal Movement: 2  Fetal Tone: 2  Fluid Volume: 2  Nonstress Test: (!) 0  Biophysical Profile Score (of 8): 8 /8  Biophysical Profile Score (of 10): 8 /10    Ultrasound Other  Fetal Presentation: Vertex  Placenta Location: Anterior  LVP 5 4 cm

## 2020-08-07 NOTE — PROGRESS NOTES
L&D Triage Note - OB/GYN    Silva Goldberg 30 y o  female MRN: [id number]  Unit/Bed#: LD TRIAGE 3 Encounter: [id number]    * Patient left before she was evaluated by Dr Gwendolyn CHACON:  30 y o  [id number] at 37w6d sent from [facility] for non-reassuring NST, Venofer infused, repeat NST & BPP done in triage  Addendum    5:15 PM   Disposition:  Patient reevaluated  -doing well  · FHT baseline 135, moderate variability, no additional deceleration noted  · Bieber: irregular contraction  · Biophysical profile:  (done by Dr Melisa Cho PGY-3 OB/GYN resident)  · RTC on 20 for NST in triage  · Iron deficiency anemia: completed Venofer infusion, last hb 11 0  · Medicinal Marijuana use: UDS neg, card scanned  · Labor precautions given, the importance of attending all appointments was stressed  Patient verbalizes understanding  · Repeat LTCS w/ tubal sterilization scheduled on 20 @ 39w2d    ______________________________________________      Plan:    37w6d  · GBS unknown- Done in the office on 20  · Continue to monitor tracings - Cat 2  · Non-adherence noted, missed DM education and growth US    A1GDM  · Calories: 2100 calories  · Protein: 98 gm/day  · Continue f/u w/ nutritionist   · Glucose goals:  Fasting 60-90, 1h < 135, 2h <120   · No log available, however patient reports that all her readings are < 120      Bipolar disorder  · Stable, denies SI/HI  · Continue with Latuda    Status post primary low transverse    · Emergent c/s for fetal intolerance in 2017  · Plan for repeat  with tubal sterilization (consent signed)    Iron deficiency anemia & alpha-thalassemia  · CBC and Venofer 200 mg IV x1 ordered    History of marijuana use disorder  · Patient reports having medicical marijuana card  · UDS ordered  · Previously documented positive UDS for marijuana [facility]  was called by OB nurse at [facility] today       ______________________________________________________________________      Chief Compliant: Referred to triage after non-reassuring ambulatory NST    TIME: 2:07 PM    Subjective:  30 y o  [id number] at 37w6d presents to triage after being seen this morning at [place] [address] for routine prenatal visit plan patient had an AST that was noted to be abnormal, baseline fetal heart tones 150 with noted decreased to 120  This pregnancy is complicated by grand multiparity, hx emergent LTCS for fetal intolerance in 10/2017, A1GDM, bipolar 1 disorder, iron deficiency anemia, alpha-thalassemia, medical marijuana use (last used 1m ago) and non adherence to treatment  Patient denies gush of fluid, vaginal discharge and bleeding  She endorses good fetal movement  She has no complain  Of note CPS was called today by OB nurse at [facility] in regards to non-compliance and 5 positive UDS  Patient reports that she recently moved an hour away from [address] [place] [address] Road, thus it has been difficult to attend all her appointment       Past Medical History:   Diagnosis Date    Anemia     Anxiety     Asthma     Bipolar 1 disorder ([facility] )     Depression     Gestational diabetes mellitus (GDM) in third trimester 2020    Obesity     Psychiatric disorder     bipolar    Substance abuse ([facility] )     UTI (urinary tract infection) 2019    Varicella     Child Hx       OB History    Para Term  AB Living   12 6 6 0 5 6   SAB TAB Ectopic Multiple Live Births   2 0 1 0 6      # Outcome Date GA Lbr Karlos/2nd Weight Sex Delivery Anes PTL Lv   12 Current            11 SAB 19 8w3d          10 SAB 10/12/18     SAB      9 Term 10/21/17 39w6d  3685 g (8 lb 2 oz) M CS-LTranv EPI N AFIA      Complications: Fetal Intolerance   8 Ectopic 16           7 AB            6 Term 11/10/13 40w0d    Vag-Spont   AFIA   5 Term 01/13/10 40w0d    Vag-Spont   AFIA   4 AB      SAB      3 Term 08 40w0d    Vag-Spont AFIA   2 Term 10/20/06 40w0d    Vag-Spont   AFIA   1 Term      Vag-Spont   AFIA      Obstetric Comments   Ex lap     No current facility-administered medications on file prior to encounter  Current Outpatient Medications on File Prior to Encounter   Medication Sig Dispense Refill    ferrous sulfate 324 (65 Fe) mg Take daily at bedtime With orange juice 90 tablet 1    lurasidone (Latuda) 20 mg tablet Take 20 mg by mouth daily with breakfast      Prenatal Vit-Fe Fumarate-FA (PNV FOLIC ACID + IRON) 86-4 MG TABS Take 1 tablet by mouth daily 90 tablet 1    acetaminophen (TYLENOL) 650 mg CR tablet Take 1 tablet (650 mg total) by mouth every 8 (eight) hours as needed for mild pain (pelvic pain) (Patient not taking: Reported on 7/14/2020) 30 tablet 0       Objective:  Vitals:    08/07/20 1321   BP: 116/65   Pulse: 87   Resp: 18   Temp: 98 3 °F (36 8 °C)     Physical Exam  Vitals signs reviewed  Constitutional:       General: She is not in acute distress  Appearance: She is well-developed  She is not ill-appearing  HENT:      Head: Normocephalic and atraumatic  Eyes:      Conjunctiva/sclera: Conjunctivae normal    Cardiovascular:      Rate and Rhythm: Normal rate and regular rhythm  Heart sounds: Normal heart sounds  No murmur  Pulmonary:      Effort: Pulmonary effort is normal       Breath sounds: Normal breath sounds  No wheezing or rales  Abdominal:      Palpations: Abdomen is soft  Tenderness: There is no abdominal tenderness  Musculoskeletal: Normal range of motion  Skin:     General: Skin is warm and dry  Neurological:      Mental Status: She is alert           FHT:  135 / Moderate 6 - 25 bpm / x1 decel (upon arival), Cat 2  Gough: Irregular      Discussed w/ and OB resident on call Krystina Delcid DO 8/7/2020 2:06 PM

## 2020-08-07 NOTE — PROGRESS NOTES
Subjective     Nathan Norris is a 30 y o  female being seen today for her obstetrical visit  She is at 37w6d gestation  Patient reports no contractions, occasional contractions and no loss of fluids   Fetal movement: normal     Menstrual History:  OB History        12    Para   6    Term   6       0    AB   5    Living   6       SAB   2    TAB   0    Ectopic   1    Multiple   0    Live Births   6           Obstetric Comments   Ex lap              No LMP recorded (lmp unknown)  Patient is pregnant  The following portions of the patient's history were reviewed and updated as appropriate: allergies, current medications, past family history, past medical history, past social history, past surgical history and problem list     Review of Systems  Pertinent items are noted in HPI  Objective     /64   Wt 84 8 kg (187 lb)   LMP  (LMP Unknown)   BMI 34 20 kg/m²   FHT: 160 BPM   Uterine Size: size equals dates   Presentations: unsure     Assessment        30year old [id number] presents at 37w6d for a prenatal visit  Patient is dong well overall   Plan     Plans for delivery: C/Section scheduled  Beta strep culture: done  Counseling: L&D discussion: symptoms of labor, discussed when to call, discussed hospital routine and anesthetic/analgesic options reviewed  -Patient had NST done in office wtith suspected variable decelerations seen  Advised patient to report to the hospital OB triage for more extended monitoring   -UDS collected today  Patient has been prescribed a medical marijuana card to take marijuana for her psychiatric condition and appetite  This has been prescribed to her by another physician  Discussed with patient that there are negative side effects to the baby when using marijuana  Discussed with patient the benefits vs risks of medical marijuana given her history of outburst and behavior    - Scheduled for IV transfusion of Iron   Plan for transfusion in hospital   - Discussed with patient importance of compliance with her appointments and care during pregnancy   - Discussed Fetal kick counts and labor precautions   - Discussed plan with Dr Pastor Paul, Dr Eder Dee and  center made aware  Decision made to send patent to triage for further monitoring   - Patient refused glucose stick in office   Follow up in 1 Week

## 2020-08-09 LAB
AMPHETAMINES UR QL SCN: NEGATIVE NG/ML
BARBITURATES UR QL SCN: NEGATIVE NG/ML
BENZODIAZ UR QL: NEGATIVE NG/ML
BZE UR QL: NEGATIVE NG/ML
CANNABINOIDS UR QL SCN: NEGATIVE NG/ML
GP B STREP DNA SPEC QL NAA+PROBE: POSITIVE
METHADONE UR QL SCN: NEGATIVE NG/ML
OPIATES UR QL: NEGATIVE NG/ML
PCP UR QL: NEGATIVE NG/ML
PROPOXYPH UR QL SCN: NEGATIVE NG/ML

## 2020-08-11 ENCOUNTER — TELEPHONE (OUTPATIENT)
Dept: FAMILY MEDICINE CLINIC | Facility: CLINIC | Age: 30
End: 2020-08-11

## 2020-08-11 ENCOUNTER — ROUTINE PRENATAL (OUTPATIENT)
Dept: FAMILY MEDICINE CLINIC | Facility: CLINIC | Age: 30
End: 2020-08-11
Payer: COMMERCIAL

## 2020-08-11 ENCOUNTER — TRANSITIONAL CARE MANAGEMENT (OUTPATIENT)
Dept: FAMILY MEDICINE CLINIC | Facility: CLINIC | Age: 30
End: 2020-08-11

## 2020-08-11 VITALS
HEART RATE: 87 BPM | OXYGEN SATURATION: 97 % | DIASTOLIC BLOOD PRESSURE: 84 MMHG | WEIGHT: 185 LBS | HEIGHT: 62 IN | BODY MASS INDEX: 34.04 KG/M2 | SYSTOLIC BLOOD PRESSURE: 132 MMHG | TEMPERATURE: 98.7 F

## 2020-08-11 DIAGNOSIS — D50.9 IRON DEFICIENCY ANEMIA DURING PREGNANCY: ICD-10-CM

## 2020-08-11 DIAGNOSIS — O99.019 IRON DEFICIENCY ANEMIA DURING PREGNANCY: ICD-10-CM

## 2020-08-11 DIAGNOSIS — O24.410 DIET CONTROLLED GESTATIONAL DIABETES MELLITUS (GDM) IN THIRD TRIMESTER: ICD-10-CM

## 2020-08-11 DIAGNOSIS — M54.50 LOW BACK PAIN, UNSPECIFIED BACK PAIN LATERALITY, UNSPECIFIED CHRONICITY, UNSPECIFIED WHETHER SCIATICA PRESENT: ICD-10-CM

## 2020-08-11 DIAGNOSIS — Z87.898 HISTORY OF DRUG USE: ICD-10-CM

## 2020-08-11 DIAGNOSIS — Z3A.38 38 WEEKS GESTATION OF PREGNANCY: Primary | ICD-10-CM

## 2020-08-11 LAB
SL AMB  POCT GLUCOSE, UA: NORMAL
SL AMB POCT CLARITY,UA: CLEAR
SL AMB POCT COLOR,UA: ABNORMAL
SL AMB POCT URINE PROTEIN: ABNORMAL

## 2020-08-11 PROCEDURE — 1036F TOBACCO NON-USER: CPT | Performed by: OBSTETRICS & GYNECOLOGY

## 2020-08-11 PROCEDURE — 81002 URINALYSIS NONAUTO W/O SCOPE: CPT | Performed by: OBSTETRICS & GYNECOLOGY

## 2020-08-11 PROCEDURE — 99213 OFFICE O/P EST LOW 20 MIN: CPT | Performed by: OBSTETRICS & GYNECOLOGY

## 2020-08-11 NOTE — TELEPHONE ENCOUNTER
Reached back out to CPS  Alonso Hennessy today  Spoke at length in regards to patients noncompliance and several positive drug screens through out the pregnancy  Patient has refused to follow diabetic education & appointments  Also has missed several appointments at  [facility]  Faxed missed appointments and positive screens to Alonso Hennessy today

## 2020-08-11 NOTE — PROGRESS NOTES
Reviewed S/S of labor and patient states she is aware to call us and head to [facility] if she feels decerase in FM, ctxs mod to strong and regular, constant abdominal pain, LOF, bleeding, HA, N/V or visual disturbances   NST reactive in office today and reviewed by Dr Mohan Doherty

## 2020-08-14 ENCOUNTER — ROUTINE PRENATAL (OUTPATIENT)
Dept: FAMILY MEDICINE CLINIC | Facility: CLINIC | Age: 30
End: 2020-08-14
Payer: COMMERCIAL

## 2020-08-14 VITALS
DIASTOLIC BLOOD PRESSURE: 80 MMHG | RESPIRATION RATE: 18 BRPM | HEART RATE: 103 BPM | BODY MASS INDEX: 33.03 KG/M2 | HEIGHT: 63 IN | SYSTOLIC BLOOD PRESSURE: 120 MMHG | OXYGEN SATURATION: 98 % | TEMPERATURE: 98 F | WEIGHT: 186.4 LBS

## 2020-08-14 DIAGNOSIS — O24.410 DIET CONTROLLED GESTATIONAL DIABETES MELLITUS (GDM) IN THIRD TRIMESTER: ICD-10-CM

## 2020-08-14 DIAGNOSIS — D50.9 IRON DEFICIENCY ANEMIA DURING PREGNANCY: ICD-10-CM

## 2020-08-14 DIAGNOSIS — Z3A.38 38 WEEKS GESTATION OF PREGNANCY: Primary | ICD-10-CM

## 2020-08-14 DIAGNOSIS — O99.019 IRON DEFICIENCY ANEMIA DURING PREGNANCY: ICD-10-CM

## 2020-08-14 DIAGNOSIS — F31.9 BIPOLAR 1 DISORDER (HCC): ICD-10-CM

## 2020-08-14 LAB
SL AMB  POCT GLUCOSE, UA: 50
SL AMB POCT URINE PROTEIN: NORMAL

## 2020-08-14 PROCEDURE — 81002 URINALYSIS NONAUTO W/O SCOPE: CPT | Performed by: FAMILY MEDICINE

## 2020-08-14 PROCEDURE — 99213 OFFICE O/P EST LOW 20 MIN: CPT | Performed by: FAMILY MEDICINE

## 2020-08-15 LAB
6MAM UR QL: NEGATIVE NG/ML
AMPHETAMINES UR QL: NEGATIVE NG/ML
BARBITURATES UR QL: NEGATIVE NG/ML
BENZODIAZ UR QL: NEGATIVE NG/ML
BZE UR QL: NEGATIVE NG/ML
CREAT UR-MCNC: 143.4 MG/DL
ETHANOL UR QL: POSITIVE NG/ML
ETHYL GLUCURONIDE UR-MCNC: ABNORMAL NG/ML
ETHYL SULFATE UR-MCNC: 6690 NG/ML
METHADONE UR QL: NEGATIVE NG/ML
OPIATES UR QL: NEGATIVE NG/ML
OXIDANTS UR QL: NEGATIVE MCG/ML
OXYCODONE UR QL: NEGATIVE NG/ML
PCP UR QL: NEGATIVE NG/ML
PH UR: 7.62 [PH] (ref 4.5–9)
THC UR QL: NEGATIVE NG/ML

## 2020-08-16 PROBLEM — Z3A.39 39 WEEKS GESTATION OF PREGNANCY: Status: ACTIVE | Noted: 2020-04-22

## 2020-08-16 NOTE — PROGRESS NOTES
Subjective     Uriel Henry is a 30 y o  female being seen today for her obstetrical visit  She is at 39w1d gestation  Patient reports no bleeding, no contractions, no cramping, no leaking and occasional contractions  Fetal movement: normal     Menstrual History:  OB History        12    Para   6    Term   6       0    AB   5    Living   6       SAB   2    TAB   0    Ectopic   1    Multiple   0    Live Births   6           Obstetric Comments   Ex lap              No LMP recorded (lmp unknown)  Patient is pregnant  The following portions of the patient's history were reviewed and updated as appropriate: allergies, current medications, past family history, past medical history, past social history, past surgical history and problem list     Review of Systems  Pertinent items are noted in HPI  Objective     /84 (BP Location: Right arm, Patient Position: Sitting, Cuff Size: Large)   Pulse 87   Temp 98 7 °F (37 1 °C) (Tympanic)   Ht 5' 2" (1 575 m)   Wt 83 9 kg (185 lb)   LMP  (LMP Unknown)   SpO2 97%   BMI 33 84 kg/m²   FHT: 140  BPM   Uterine Size: 38 cm   Presentations: unsure        Assessment     30year old [id number] presents for a prenatal visit  Plan     Plans for delivery: C/Section scheduled  Beta strep culture: done, discussed  Counseling: L&D discussion: symptoms of labor, rupture of membranes and hospital length of stay  NST reactive  Will need to continue biweekly NST monitoring until    Follow up in 2 days

## 2020-08-16 NOTE — PROGRESS NOTES
Subjective     Theodora Montejo is a 30 y o  female being seen today for her obstetrical visit  She is at 38w6d gestation  Patient reports no bleeding, no contractions, no cramping, no leaking and occasional contractions  Fetal movement: normal     Menstrual History:  OB History        12    Para   6    Term   6       0    AB   5    Living   6       SAB   2    TAB   0    Ectopic   1    Multiple   0    Live Births   6           Obstetric Comments   Ex lap              No LMP recorded (lmp unknown)  Patient is pregnant  The following portions of the patient's history were reviewed and updated as appropriate: allergies, current medications, past family history, past medical history, past social history, past surgical history and problem list     Review of Systems  Pertinent items are noted in HPI  Objective     /80 (BP Location: Left arm, Patient Position: Sitting, Cuff Size: Standard)   Pulse 103   Temp 98 °F (36 7 °C) (Tympanic)   Resp 18   Ht 5' 3 19" (1 605 m)   Wt 84 6 kg (186 lb 6 4 oz)   LMP  (LMP Unknown)   SpO2 98%   BMI 32 82 kg/m²   FHT: 140 BPM     Assessment    30year old [id number] presents for a prenatal visit at 38w6d  Plan    Patient performed NST- reactive  Reviewed by Dr Leslie Tate  After NST, patient removed monitor and left the office without letting anyone know  Called patient and completed questioning over the phone   Explained to patient that she should not leave her appointments without completing a full examination    Plans for delivery: C/Section scheduled  Beta strep culture: done  Discussed  labor   Continue with all supplementation and medication  Follow up   for scheduled

## 2020-08-18 ENCOUNTER — TELEPHONE (OUTPATIENT)
Dept: FAMILY MEDICINE CLINIC | Facility: CLINIC | Age: 30
End: 2020-08-18

## 2020-08-18 NOTE — TELEPHONE ENCOUNTER
LEFT A MESSAGE FOR TOMEKA FROM CPS IN REGARDS TO PATIENTS POSITIVE DRUG ALCOHOL SCREEN FROM 8/14/2020

## 2020-09-02 ENCOUNTER — TELEPHONE (OUTPATIENT)
Dept: FAMILY MEDICINE CLINIC | Facility: CLINIC | Age: 30
End: 2020-09-02

## 2020-09-02 NOTE — TELEPHONE ENCOUNTER
Called infusion and relayed the message that doctor Elenita Guardado would like her to continue her infusions for postpartum  They informed me that you would have to put a new order in and change the dates before they can reach out to the patient to schedule

## 2020-09-02 NOTE — TELEPHONE ENCOUNTER
She should finish them that will help her postpartum as well, can we call her to see she can reschedule all her  missed appiontment with infusion?

## 2020-10-09 ENCOUNTER — TELEPHONE (OUTPATIENT)
Dept: FAMILY MEDICINE CLINIC | Facility: CLINIC | Age: 30
End: 2020-10-09

## 2020-10-15 RX ORDER — SODIUM CHLORIDE 9 MG/ML
20 INJECTION, SOLUTION INTRAVENOUS ONCE
Status: CANCELLED | OUTPATIENT
Start: 2020-10-15

## 2020-10-21 ENCOUNTER — TELEPHONE (OUTPATIENT)
Dept: FAMILY MEDICINE CLINIC | Facility: CLINIC | Age: 30
End: 2020-10-21

## 2020-10-21 NOTE — TELEPHONE ENCOUNTER
Good Morning Dr Bernadette Llanos,      The pre-auth for this patient's iron infusion was denied, as the insurance company did not find it medically necessary according to the patient's labs  Please advise if you would like to do a peer to peer for this patient

## 2020-10-21 NOTE — TELEPHONE ENCOUNTER
Called patient and left voicemail  If patient calls back, please inform her that the transfusion for 10/23 has been cancelled as of now, as the pre-authorization was denied

## 2020-10-26 NOTE — TELEPHONE ENCOUNTER
Pre-encounter called to check on the status of this venofer prior auth   Patient is rescheduled for 10/30/2020

## 2020-10-30 ENCOUNTER — HOSPITAL ENCOUNTER (OUTPATIENT)
Dept: INFUSION CENTER | Facility: HOSPITAL | Age: 30
Discharge: HOME | End: 2020-10-30
Attending: FAMILY MEDICINE

## 2022-07-09 ENCOUNTER — HOSPITAL ENCOUNTER (EMERGENCY)
Facility: HOSPITAL | Age: 32
Discharge: HOME | End: 2022-07-09
Attending: EMERGENCY MEDICINE
Payer: COMMERCIAL

## 2022-07-09 VITALS
HEART RATE: 89 BPM | SYSTOLIC BLOOD PRESSURE: 122 MMHG | TEMPERATURE: 98.1 F | RESPIRATION RATE: 20 BRPM | OXYGEN SATURATION: 99 % | DIASTOLIC BLOOD PRESSURE: 80 MMHG

## 2022-07-09 DIAGNOSIS — N89.8 VAGINAL ITCHING: Primary | ICD-10-CM

## 2022-07-09 DIAGNOSIS — B37.31 CANDIDA VAGINITIS: ICD-10-CM

## 2022-07-09 DIAGNOSIS — R30.0 DYSURIA: ICD-10-CM

## 2022-07-09 DIAGNOSIS — N39.0 UTI (URINARY TRACT INFECTION): ICD-10-CM

## 2022-07-09 DIAGNOSIS — A59.9 TRICHOMONAS INFECTION: ICD-10-CM

## 2022-07-09 LAB
BACTERIA UR QL AUTO: ABNORMAL /HPF
BILIRUB UR QL STRIP: NEGATIVE
CLARITY UR: ABNORMAL
COLOR UR: YELLOW
EXT PREG TEST URINE: NEGATIVE
EXT. CONTROL ED NAV: NORMAL
GLUCOSE UR STRIP-MCNC: NEGATIVE MG/DL
HGB UR QL STRIP.AUTO: NEGATIVE
HYALINE CASTS #/AREA URNS LPF: ABNORMAL /LPF
KETONES UR STRIP-MCNC: ABNORMAL MG/DL
LEUKOCYTE ESTERASE UR QL STRIP: ABNORMAL
MUCOUS THREADS UR QL AUTO: ABNORMAL
NITRITE UR QL STRIP: NEGATIVE
NON-SQ EPI CELLS URNS QL MICRO: ABNORMAL /HPF
PH UR STRIP.AUTO: 5.5 [PH]
PROT UR STRIP-MCNC: ABNORMAL MG/DL
RBC #/AREA URNS AUTO: ABNORMAL /HPF
RENAL EPI CELLS #/AREA URNS HPF: PRESENT /[HPF]
SP GR UR STRIP.AUTO: 1.03 (ref 1–1.03)
TRANS CELLS #/AREA URNS HPF: PRESENT /[HPF]
UROBILINOGEN UR STRIP-ACNC: 4 MG/DL
WBC #/AREA URNS AUTO: ABNORMAL /HPF

## 2022-07-09 PROCEDURE — 81514 NFCT DS BV&VAGINITIS DNA ALG: CPT | Performed by: EMERGENCY MEDICINE

## 2022-07-09 PROCEDURE — 81001 URINALYSIS AUTO W/SCOPE: CPT | Performed by: EMERGENCY MEDICINE

## 2022-07-09 PROCEDURE — 87591 N.GONORRHOEAE DNA AMP PROB: CPT | Performed by: EMERGENCY MEDICINE

## 2022-07-09 PROCEDURE — 99284 EMERGENCY DEPT VISIT MOD MDM: CPT | Performed by: EMERGENCY MEDICINE

## 2022-07-09 PROCEDURE — 81025 URINE PREGNANCY TEST: CPT | Performed by: EMERGENCY MEDICINE

## 2022-07-09 PROCEDURE — 87491 CHLMYD TRACH DNA AMP PROBE: CPT | Performed by: EMERGENCY MEDICINE

## 2022-07-09 PROCEDURE — 99283 EMERGENCY DEPT VISIT LOW MDM: CPT

## 2022-07-09 RX ORDER — CEPHALEXIN 500 MG/1
500 CAPSULE ORAL EVERY 6 HOURS SCHEDULED
Qty: 20 CAPSULE | Refills: 0 | Status: SHIPPED | OUTPATIENT
Start: 2022-07-09 | End: 2022-07-09

## 2022-07-09 RX ORDER — METRONIDAZOLE 500 MG/1
500 TABLET ORAL EVERY 12 HOURS SCHEDULED
Qty: 14 TABLET | Refills: 0 | Status: SHIPPED | OUTPATIENT
Start: 2022-07-09 | End: 2022-07-16

## 2022-07-09 RX ORDER — CEPHALEXIN 500 MG/1
500 CAPSULE ORAL EVERY 6 HOURS SCHEDULED
Qty: 20 CAPSULE | Refills: 0 | Status: SHIPPED | OUTPATIENT
Start: 2022-07-09 | End: 2022-07-14

## 2022-07-09 RX ORDER — CEPHALEXIN 500 MG/1
500 CAPSULE ORAL ONCE
Status: COMPLETED | OUTPATIENT
Start: 2022-07-09 | End: 2022-07-09

## 2022-07-09 RX ORDER — METRONIDAZOLE 500 MG/1
500 TABLET ORAL EVERY 12 HOURS SCHEDULED
Qty: 14 TABLET | Refills: 0 | Status: SHIPPED | OUTPATIENT
Start: 2022-07-09 | End: 2022-07-09

## 2022-07-09 RX ORDER — METRONIDAZOLE 500 MG/1
500 TABLET ORAL ONCE
Status: COMPLETED | OUTPATIENT
Start: 2022-07-09 | End: 2022-07-09

## 2022-07-09 RX ADMIN — CEPHALEXIN 500 MG: 500 CAPSULE ORAL at 17:34

## 2022-07-09 RX ADMIN — METRONIDAZOLE 500 MG: 500 TABLET ORAL at 16:54

## 2022-07-09 NOTE — DISCHARGE INSTRUCTIONS
You were evaluated in the emergency department for: vaginal itching  You can access your current and pending results through [facility]  A radiologist will take a second look at your X-Rays, if you had any, and you will be contacted with any new findings  You should follow-up with your primary care provider, as soon as possible, for re-evaluation  If you do not have a primary care provider, I have referred you to [address] [place] [address]  You will be contacted about scheduling an appointment  Their phone number is also included on this paperwork  Your workup revealed no emergent features at this time; however, many disease processes are dynamic:    Please, return to the emergency department if you experience new or worsening symptoms, fever, chest pain, shortness of breath, difficulty breathing, dizziness, abdominal pain, persistent nausea/vomiting, syncope or passing out, blood in your urine or stool, coughing up blood, leg swelling/pain, urinary retention, bowel or bladder incontinence, numbness between your legs  You should get your sexual partner tested for trichomonas  You should not have sex until your treatment is complete

## 2022-07-09 NOTE — Clinical Note
Beatriz Salmon was seen and treated in our emergency department on 7/9/2022  Diagnosis:     Charmayne Duster  may return to work on return date  She may return on this date: 07/10/2022         If you have any questions or concerns, please don't hesitate to call        Aly Salmon MD    ______________________________           _______________          _______________  Hospital Representative                              Date                                Time

## 2022-07-09 NOTE — ED PROVIDER NOTES
History  Chief Complaint   Patient presents with    Vaginal Itching     Pt reports she believes she has a "really bad yeast infection", pt reports really bad itching for the past 3-4 days and burning on urination     Patient is a 50-year-old female, with history significant for bipolar 1 and Trichomonas infection on 06/14 (treated by Infectious Disease with Flagyl, negative HIV and RPR testing at that time), who presents to the ED today do a 3-4 day history of gradual onset and progressive worsening vaginal itching  There is associated dysuria and polyuria  There is no associated fever, abdominal pain, chest pain, shortness of breath, vaginal discharge  Patient denies sexual activity since February of this year  No new partners  She does not use contraception  Last menstrual period one month ago  Patient has used hydrocortisone cream to try remitting  There are no clear exacerbating factors  Patient is without other concerns at this time     - No language barrier    - History obtained from patient  - There are no limitations to the history obtained  - Previous charting was reviewed          Prior to Admission Medications   Prescriptions Last Dose Informant Patient Reported? Taking?    Blood Glucose Monitoring Suppl (ONETOUCH VERIO) w/Device KIT   No No   Sig: Use daily to test blood sugar 4 times per day, fasting and 2 hours after the start of each meal    ONETOUCH VERIO test strip   No No   Sig: Test blood Sugar 4 times per day, fasting and 2 hours after the start of each meal    OneTouch Delica Lancets 40P MISC   No No   Sig: Test blood Sugar 4 times per day, fasting and 2 hours after the start of each meal    Prenatal Vit-Fe Fumarate-FA (PNV FOLIC ACID + IRON) 55-4 MG TABS  Self No No   Sig: Take 1 tablet by mouth daily   albuterol (PROVENTIL HFA,VENTOLIN HFA) 90 mcg/act inhaler   No No   Sig: Inhale 2 puffs every 6 (six) hours as needed for wheezing or shortness of breath   ferrous sulfate 324 (65 Fe) mg  Self No No   Sig: Take daily at bedtime With orange juice   lurasidone (Latuda) 20 mg tablet  Self Yes No   Sig: Take 20 mg by mouth daily with breakfast      Facility-Administered Medications: None       Past Medical History:   Diagnosis Date    Anemia     Anxiety     Asthma     Bipolar 1 disorder ([facility] )     Depression     Gestational diabetes mellitus (GDM) in third trimester 2020    Obesity     Psychiatric disorder     bipolar    Substance abuse ([facility] )     UTI (urinary tract infection) 2019    Varicella     Child Hx       Past Surgical History:   Procedure Laterality Date     SECTION      [place]  DELIVERY ONLY Bilateral 10/21/2017    Procedure:  SECTION (); Surgeon: Estephania Ellis MD;  [facility];  Service: Obstetrics    [place] LAP,DIAGNOSTIC ABDOMEN N/A 2016    Procedure: EXPLORATORY LAPAROTOMY, LEFT SALPINGECTOMY;  Surgeon: Ish Viramontes MD;  Location:  MAIN OR;  Service: Gynecology    [place] SURG RX MISSED ABORTN,1ST TRI N/A 7/10/2019    Procedure: DILATATION AND EVACUATION (D&E) (8 WEEKS); Surgeon: Jessika De Los Santos MD;  Location: [facility];  Service: Gynecology       Family History   Problem Relation Age of Onset   Sary Farnsworth HIV Mother     No Known Problems Father     Breast cancer Maternal Grandmother     Hypertension Maternal Grandmother     Diabetes Paternal Grandmother      I have reviewed and agree with the history as documented  E-Cigarette/Vaping    E-Cigarette Use Never User      E-Cigarette/Vaping Substances    Nicotine No     THC No     CBD No     Flavoring No     Other No     Unknown No      Social History     Tobacco Use    Smoking status: Former Smoker     Packs/day: 0 25     Years: 15 00     Pack years: 3 75    Smokeless tobacco: Never Used    Tobacco comment: Pt is uninterested in quitting smoking     Vaping Use    Vaping Use: Never used   Substance Use Topics    Alcohol use: Not Currently    Drug use: Yes     Types: Marijuana     Comment: medical         Review of Systems   Constitutional: Negative for fever  HENT: Negative for trouble swallowing  Eyes: Negative for visual disturbance  Respiratory: Negative for shortness of breath  Cardiovascular: Negative for chest pain  Gastrointestinal: Negative for abdominal pain  Endocrine: Positive for polyuria  Genitourinary: Positive for dysuria  Musculoskeletal: Negative for gait problem  Skin: Negative for rash  Allergic/Immunologic: Negative for environmental allergies  Neurological: Negative for weakness and numbness  Hematological: Negative for adenopathy  Psychiatric/Behavioral: Negative for confusion  All other systems reviewed and are negative  Physical Exam  ED Triage Vitals [07/09/22 1451]   Temperature Pulse Respirations Blood Pressure SpO2   98 1 °F (36 7 °C) 89 20 122/80 99 %      Temp Source Heart Rate Source Patient Position - Orthostatic VS BP Location FiO2 (%)   Oral Monitor Sitting Right arm --      Pain Score       No Pain             Orthostatic Vital Signs  Vitals:    07/09/22 1451   BP: 122/80   Pulse: 89   Patient Position - Orthostatic VS: Sitting       Physical Exam  Vitals and nursing note reviewed  Exam conducted with a chaperone present  Constitutional:       General: She is not in acute distress  Appearance: She is not ill-appearing, toxic-appearing or diaphoretic  Comments: Patient appears comfortable during my evaluation    HENT:      Head: Normocephalic and atraumatic  Right Ear: External ear normal       Left Ear: External ear normal       Nose: Nose normal  No rhinorrhea  Mouth/Throat:      Mouth: Mucous membranes are moist       Pharynx: Oropharynx is clear  No oropharyngeal exudate or posterior oropharyngeal erythema  Eyes:      General: No scleral icterus  Right eye: No discharge  Left eye: No discharge        Conjunctiva/sclera: Conjunctivae normal       Pupils: Pupils are equal, round, and reactive to light  Neck:      Comments: Patient is spontaneously rotating their neck to the left and right during the history and physical exam interaction without difficulty or apparent discomfort    Cardiovascular:      Rate and Rhythm: Normal rate and regular rhythm  Pulses: Normal pulses  Heart sounds: Normal heart sounds  No murmur heard  No friction rub  No gallop  Comments: 2+ Radial  Pulmonary:      Effort: Pulmonary effort is normal  No respiratory distress  Breath sounds: Normal breath sounds  No stridor  No wheezing, rhonchi or rales  Abdominal:      General: Abdomen is flat  There is no distension  Palpations: Abdomen is soft  Tenderness: There is no abdominal tenderness  There is no right CVA tenderness, left CVA tenderness, guarding or rebound  Genitourinary:     General: Normal vulva  Comments: Strawberry cervix, thin white vaginal discharge   Musculoskeletal:         General: No deformity  Cervical back: Neck supple  No tenderness  No muscular tenderness  Lymphadenopathy:      Cervical: No cervical adenopathy  Skin:     General: Skin is warm and dry  Capillary Refill: Capillary refill takes less than 2 seconds  Neurological:      Mental Status: She is alert  Comments: Patient is speaking clearly in complete sentences  Patient is answering appropriately and able follow commands  Patient is moving all four extremities spontaneously  No facial droop  Tongue midline    Patient ate without difficulty      Psychiatric:         Mood and Affect: Mood normal          Behavior: Behavior normal          ED Medications  Medications   metroNIDAZOLE (FLAGYL) tablet 500 mg (500 mg Oral Given 7/9/22 1654)   cephalexin (KEFLEX) capsule 500 mg (500 mg Oral Given 7/9/22 1734)       Diagnostic Studies  Results Reviewed     Procedure Component Value Units Date/Time    Urine Microscopic [[id number]]  (Abnormal) Collected: 07/09/22 1644 Lab Status: Final result Specimen: Urine, Clean Catch Updated: 07/09/22 1724     RBC, UA 1-2 /hpf      WBC, UA 4-10 /hpf      Epithelial Cells Occasional /hpf      Bacteria, UA Moderate /hpf      MUCUS THREADS Innumerable     Hyaline Casts, UA 0-3 /lpf      Renal Epithelial Cells Present     Transitional Epithelial Cells Present    UA w Reflex to Microscopic w Reflex to Culture [[id number]]  (Abnormal) Collected: 07/09/22 1644    Lab Status: Final result Specimen: Urine, Clean Catch Updated: 07/09/22 1703     Color, UA Yellow     Clarity, UA Turbid     Specific [place], UA 1 027     pH, UA 5 5     Leukocytes, UA Large     Nitrite, UA Negative     Protein, UA 30 (1+) mg/dl      Glucose, UA Negative mg/dl      Ketones, UA Trace mg/dl      Urobilinogen, UA 4 0 mg/dl      Bilirubin, UA Negative     Occult Blood, UA Negative    Chlamydia/GC amplified DNA by PCR [[id number]] Collected: 07/09/22 1645    Lab Status: In process Specimen: Vaginal Updated: 07/09/22 1656    Molecular Vaginal Panel [[id number]] Collected: 07/09/22 1648    Lab Status: In process Specimen: Genital from Vaginal Updated: 07/09/22 1656    POCT pregnancy, urine [[id number]]  (Normal) Resulted: 07/09/22 1651    Lab Status: Final result Updated: 07/09/22 1651     EXT PREG TEST UR (Ref: Negative) negative     Control valid                 No orders to display         Procedures  Procedures      ED Course  ED Course as of 07/09/22 1735   Sat Jul 09, 2022   1704 Leukocytes, UA(!): Large   1735 Patient has neither questions nor concerns after receiving discharge instructions and return precautions                              SBIRT 22yo+    Flowsheet Row Most Recent Value   SBIRT (25 yo +)    In order to provide better care to our patients, we are screening all of our patients for alcohol and drug use  Would it be okay to ask you these screening questions?  Unable to answer at this time Filed at: 07/09/2022 1613                [facility]  Number of Diagnoses or Management Options  Dysuria  Trichomonas infection  Vaginal itching  Diagnosis management comments: Patient is a 52-year-old female, with history significant for bipolar 1 and Trichomonas 2nd on 06/14 (treated by Infectious Disease with Flagyl, negative HIV and RPR testing at that time), who presents to the ED today do a 3-4 day history of gradual onset and progressive worsening vaginal itching  There is associated dysuria and polyuria  There is no associated fever, abdominal pain, chest pain, shortness of breath, vaginal discharge  Patient is currently afebrile and hemodynamically stable  Her physical exam is unremarkable except for strawberry cervix and cervical discharge  This presentation is concerning for:  UTI, pregnancy, Trichomonas, BV  Will manage based upon workup  Disposition  Final diagnoses:   Vaginal itching   Dysuria   Trichomonas infection   UTI (urinary tract infection)     Time reflects when diagnosis was documented in both MDM as applicable and the Disposition within this note     Time User Action Codes Description Comment    7/9/2022  4:51 PM Florence Stefania A Add [N89 8] Vaginal itching     7/9/2022  4:51 PM Florence Stefania A Add [R30 0] Dysuria     7/9/2022  4:52 PM Legare Michaeleliecer Deidre A Add [A59 9] Trichomonas infection     7/9/2022  5:27 PM Maye Croft Washougal A Add [N39 0] UTI (urinary tract infection)       ED Disposition     ED Disposition   Discharge    Condition   Stable    Date/Time   Sat Jul 9, 2022  5:31 PM    Comment   Ashley Paul discharge to home/self care                 Follow-up Information     Follow up With Specialties Details Why Contact Info Additional 350 [facility] Schedule an appointment as soon as possible for a visit   [address], [address] [facility] [address]  [address], [address], [address] [place], [place], [address]   [phone]          Patient's Medications   Discharge Prescriptions    CEPHALEXIN (KEFLEX) 500 MG CAPSULE    Take 1 capsule (500 mg total) by mouth every 6 (six) hours for 5 days       Start Date: 7/9/2022  End Date: 7/14/2022       Order Dose: 500 mg       Quantity: 20 capsule    Refills: 0    METRONIDAZOLE (FLAGYL) 500 MG TABLET    Take 1 tablet (500 mg total) by mouth every 12 (twelve) hours for 7 days       Start Date: 7/9/2022  End Date: 7/16/2022       Order Dose: 500 mg       Quantity: 14 tablet    Refills: 0     No discharge procedures on file  PDMP Review     None           ED Provider  Attending physically available and evaluated Anabella Mcbride I managed the patient along with the ED Attending      Electronically Signed by         Rosalio Masters MD  07/09/22 5885

## 2022-07-10 LAB
C GLABRATA DNA VAG QL NAA+PROBE: NEGATIVE
C KRUSEI DNA VAG QL NAA+PROBE: NEGATIVE
C TRACH DNA SPEC QL NAA+PROBE: NEGATIVE
CANDIDA SP 6 PNL VAG NAA+PROBE: POSITIVE
N GONORRHOEA DNA SPEC QL NAA+PROBE: NEGATIVE
T VAGINALIS DNA VAG QL NAA+PROBE: NEGATIVE
VAGINOSIS/ITIS DNA PNL VAG PROBE+SIG AMP: NEGATIVE

## 2022-07-10 RX ORDER — FLUCONAZOLE 150 MG/1
150 TABLET ORAL ONCE
Qty: 2 TABLET | Refills: 0 | Status: SHIPPED | OUTPATIENT
Start: 2022-07-10 | End: 2022-07-10

## 2022-07-10 NOTE — ED ATTENDING ATTESTATION
7/9/2022  I, Terrace Dancer, MD, saw and evaluated the patient  I have discussed the patient with the resident/non-physician practitioner and agree with the resident's/non-physician practitioner's findings, Plan of Care, and MDM as documented in the resident's/non-physician practitioner's note, except where noted  All available labs and Radiology studies were reviewed  I was present for key portions of any procedure(s) performed by the resident/non-physician practitioner and I was immediately available to provide assistance  At this point I agree with the current assessment done in the Emergency Department  I have conducted an independent evaluation of this patient a history and physical is as follows:    ED Course    73-year-old female with history of Trichomonas vaginalis presents with symptoms concerning for vaginitis  Patient does states she has weight translucent appearing discharge at this time vaginal itching  Denies any abdominal pain or flank pain  Abdomen soft nontender nondistended normal bowel sounds  Pelvic per resident chart  Impression:  Vaginitis    Plan check vaginitis screening panel urinalysis antibiotics anticipate discharge patient counseled to have partner tested and treated for Trichomonas    Critical Care Time  Procedures

## 2022-07-10 NOTE — RESULT ENCOUNTER NOTE
Spoke with patient regarding + candida culture, prescription for diflucan sent over to [address] [place] [address] pharmacy, also advised her to hold off on taking with previously rxed antibiotics as the candida infection may very well be what is causing her sx's

## 2022-07-18 ENCOUNTER — APPOINTMENT (EMERGENCY)
Dept: RADIOLOGY | Facility: HOSPITAL | Age: 32
End: 2022-07-18
Payer: COMMERCIAL

## 2022-07-18 ENCOUNTER — HOSPITAL ENCOUNTER (EMERGENCY)
Facility: HOSPITAL | Age: 32
Discharge: HOME | End: 2022-07-18
Attending: EMERGENCY MEDICINE
Payer: COMMERCIAL

## 2022-07-18 VITALS
TEMPERATURE: 99.7 F | OXYGEN SATURATION: 100 % | RESPIRATION RATE: 18 BRPM | SYSTOLIC BLOOD PRESSURE: 126 MMHG | HEART RATE: 88 BPM | WEIGHT: 188.71 LBS | DIASTOLIC BLOOD PRESSURE: 74 MMHG | BODY MASS INDEX: 33.23 KG/M2

## 2022-07-18 DIAGNOSIS — Z20.822 EXPOSURE TO COVID-19 VIRUS: ICD-10-CM

## 2022-07-18 DIAGNOSIS — Z20.822 PERSON UNDER INVESTIGATION FOR COVID-19: Primary | ICD-10-CM

## 2022-07-18 LAB
ATRIAL RATE: 91 BPM
EXT PREG TEST URINE: NEGATIVE
EXT. CONTROL ED NAV: NORMAL
P AXIS: 65 DEGREES
PR INTERVAL: 142 MS
QRS AXIS: 53 DEGREES
QRSD INTERVAL: 74 MS
QT INTERVAL: 332 MS
QTC INTERVAL: 408 MS
SARS-COV-2 RNA RESP QL NAA+PROBE: POSITIVE
T WAVE AXIS: 15 DEGREES
VENTRICULAR RATE: 91 BPM

## 2022-07-18 PROCEDURE — 99285 EMERGENCY DEPT VISIT HI MDM: CPT | Performed by: PHYSICIAN ASSISTANT

## 2022-07-18 PROCEDURE — U0005 INFEC AGEN DETEC AMPLI PROBE: HCPCS | Performed by: PHYSICIAN ASSISTANT

## 2022-07-18 PROCEDURE — 93010 ELECTROCARDIOGRAM REPORT: CPT | Performed by: INTERNAL MEDICINE

## 2022-07-18 PROCEDURE — 71046 X-RAY EXAM CHEST 2 VIEWS: CPT

## 2022-07-18 PROCEDURE — 93005 ELECTROCARDIOGRAM TRACING: CPT

## 2022-07-18 PROCEDURE — 99285 EMERGENCY DEPT VISIT HI MDM: CPT

## 2022-07-18 PROCEDURE — 81025 URINE PREGNANCY TEST: CPT | Performed by: PHYSICIAN ASSISTANT

## 2022-07-18 PROCEDURE — U0003 INFECTIOUS AGENT DETECTION BY NUCLEIC ACID (DNA OR RNA); SEVERE ACUTE RESPIRATORY SYNDROME CORONAVIRUS 2 (SARS-COV-2) (CORONAVIRUS DISEASE [COVID-19]), AMPLIFIED PROBE TECHNIQUE, MAKING USE OF HIGH THROUGHPUT TECHNOLOGIES AS DESCRIBED BY CMS-2020-01-R: HCPCS | Performed by: PHYSICIAN ASSISTANT

## 2022-07-18 RX ORDER — FLUCONAZOLE 100 MG/1
200 TABLET ORAL ONCE
Status: COMPLETED | OUTPATIENT
Start: 2022-07-18 | End: 2022-07-18

## 2022-07-18 RX ORDER — ACETAMINOPHEN 325 MG/1
650 TABLET ORAL ONCE
Status: COMPLETED | OUTPATIENT
Start: 2022-07-18 | End: 2022-07-18

## 2022-07-18 RX ORDER — ALBUTEROL SULFATE 90 UG/1
2 AEROSOL, METERED RESPIRATORY (INHALATION) EVERY 4 HOURS PRN
Qty: 8 G | Refills: 0 | Status: SHIPPED | OUTPATIENT
Start: 2022-07-18

## 2022-07-18 RX ADMIN — ACETAMINOPHEN 650 MG: 325 TABLET ORAL at 15:00

## 2022-07-18 RX ADMIN — FLUCONAZOLE 200 MG: 100 TABLET ORAL at 15:00

## 2022-07-18 NOTE — ED PROVIDER NOTES
History  Chief Complaint   Patient presents with    Chest Pain     Pt stated that she was exposed to COVID at work and now has chest pain since last night and is SOB  81-year-old female presenting for evaluation of multiple URI symptoms  Patient states that there has been a COVID 19 break out at her work  She reports starting last night she had sore throat, generalized body aches, cough and feeling intermittently short of breath  She has no complaints chest pain or shortness of breath at this time  Did not take any medication prior to arrival     In addition, she reports she has recently treated for yeast infection and took a single dose of Diflucan  She states she accidentally threw her 2nd dose out and would like it today  Prior to Admission Medications   Prescriptions Last Dose Informant Patient Reported? Taking?    Blood Glucose Monitoring Suppl (Elissa Martins) w/Device KIT   No No   Sig: Use daily to test blood sugar 4 times per day, fasting and 2 hours after the start of each meal    ONETOUCH VERIO test strip   No No   Sig: Test blood Sugar 4 times per day, fasting and 2 hours after the start of each meal    OneTouch Delica Lancets 55D MISC   No No   Sig: Test blood Sugar 4 times per day, fasting and 2 hours after the start of each meal    Prenatal Vit-Fe Fumarate-FA (PNV FOLIC ACID + IRON) 45-3 MG TABS  Self No No   Sig: Take 1 tablet by mouth daily   albuterol (PROVENTIL HFA,VENTOLIN HFA) 90 mcg/act inhaler   No No   Sig: Inhale 2 puffs every 6 (six) hours as needed for wheezing or shortness of breath   ferrous sulfate 324 (65 Fe) mg  Self No No   Sig: Take daily at bedtime With orange juice   lurasidone (Latuda) 20 mg tablet  Self Yes No   Sig: Take 20 mg by mouth daily with breakfast      Facility-Administered Medications: None       Past Medical History:   Diagnosis Date    Anemia     Anxiety     Asthma     Bipolar 1 disorder (HCC)     Depression     Gestational diabetes mellitus (GDM) in third trimester 2020    Obesity     Psychiatric disorder     bipolar    Substance [facility] ([facility] )     UTI (urinary tract infection) 2019    Varicella     Child Hx       Past Surgical History:   Procedure Laterality Date     SECTION      [place]  DELIVERY ONLY Bilateral 10/21/2017    Procedure:  SECTION (); Surgeon: Alis Wills MD;  [facility];  Service: Obstetrics    [place] LAP,DIAGNOSTIC ABDOMEN N/A 2016    Procedure: EXPLORATORY LAPAROTOMY, LEFT SALPINGECTOMY;  Surgeon: Dallin Pathak MD;  Location:  MAIN OR;  Service: Gynecology    [place] SURG RX MISSED ABORTN,1ST TRI N/A 7/10/2019    Procedure: DILATATION AND EVACUATION (D&E) (8 WEEKS); Surgeon: Garth Cohen MD;  Location: [facility];  Service: Gynecology       Family History   Problem Relation Age of Onset   Agustín Ball HIV Mother     No Known Problems Father     Breast cancer Maternal Grandmother     Hypertension Maternal Grandmother     Diabetes Paternal Grandmother      I have reviewed and agree with the history as documented  E-Cigarette/Vaping    E-Cigarette Use Never User      E-Cigarette/Vaping Substances    Nicotine No     THC No     CBD No     Flavoring No     Other No     Unknown No      Social History     Tobacco Use    Smoking status: Former Smoker     Packs/day: 0 25     Years: 15 00     Pack years: 3 75    Smokeless tobacco: Never Used    Tobacco comment: Pt is uninterested in quitting smoking  Vaping Use    Vaping Use: Never used   Substance Use Topics    Alcohol use: Not Currently    Drug use: Yes     Types: Marijuana     Comment: medical        Review of Systems   All other systems reviewed and are negative  Physical Exam  Physical Exam  Vitals and nursing note reviewed  Constitutional:       General: She is not in acute distress  Appearance: She is well-developed  She is not ill-appearing or diaphoretic  HENT:      Head: Normocephalic and atraumatic  Eyes:      Conjunctiva/sclera: Conjunctivae normal       Comments: EOM grossly intact   Neck:      Vascular: No JVD  Cardiovascular:      Rate and Rhythm: Normal rate  Heart sounds: Normal heart sounds  Pulmonary:      Effort: Pulmonary effort is normal       Breath sounds: Normal breath sounds  No decreased breath sounds, wheezing, rhonchi or rales  Abdominal:      Palpations: Abdomen is soft  Musculoskeletal:      Cervical back: Normal range of motion and neck supple  Comments: FROM, steady gait, cap refill brisk, strength and sensation grossly intact throughout   Skin:     General: Skin is warm and dry  Capillary Refill: Capillary refill takes less than 2 seconds  Neurological:      Mental Status: She is alert and oriented to person, place, and time  Psychiatric:         Behavior: Behavior normal          Vital Signs  ED Triage Vitals [07/18/22 1419]   Temperature Pulse Respirations Blood Pressure SpO2   99 7 °F (37 6 °C) 88 18 126/74 100 %      Temp Source Heart Rate Source Patient Position - Orthostatic VS BP Location FiO2 (%)   Tympanic Monitor Lying Left arm --      Pain Score       No Pain           Vitals:    07/18/22 1419   BP: 126/74   Pulse: 88   Patient Position - Orthostatic VS: Lying         Visual Acuity      ED Medications  Medications   fluconazole (DIFLUCAN) tablet 200 mg (200 mg Oral Given 7/18/22 1500)   acetaminophen (TYLENOL) tablet 650 mg (650 mg Oral Given 7/18/22 1500)       Diagnostic Studies  Results Reviewed     Procedure Component Value Units Date/Time    POCT pregnancy, urine [[id number]]  (Normal) Resulted: 07/18/22 1437    Lab Status: Final result Updated: 07/18/22 1439     EXT PREG TEST UR (Ref: Negative) Negative     Control Valid    COVID only [[id number]] Collected: 07/18/22 1433    Lab Status:  In process Specimen: Nares from Nose Updated: 07/18/22 1438                 XR chest 2 views   Final Result by Tristan Ya MD (07/18 1503)      No acute cardiopulmonary disease  In the setting of clinically suspected/proven COVID-19, this plain film appearance does not contain findings that raise concern for viral pneumonia such as COVID-19, but does not rule out this diagnosis  Workstation performed: [id number]                    Procedures  ECG 12 Lead Documentation Only    Date/Time: 7/18/2022 2:32 PM  Performed by: Ashwin Drake PA-C  Authorized by: Ashwin Drake PA-C     Indications / Diagnosis:  Cp, sob  ECG reviewed by me, the ED Provider: yes    Patient location:  ED  Interpretation:     Interpretation: normal    Rate:     ECG rate:  91    ECG rate assessment: normal    Rhythm:     Rhythm: sinus rhythm    Ectopy:     Ectopy: none    QRS:     QRS axis:  Normal  Conduction:     Conduction: normal    ST segments:     ST segments:  Normal  Comments:      qtc 408             ED Course                               SBIRT 22yo+    Flowsheet Row Most Recent Value   SBIRT (23 yo +)    In order to provide better care to our patients, we are screening all of our patients for alcohol and drug use  Would it be okay to ask you these screening questions? No Filed at: 07/18/2022 1421                    [facility]  Number of Diagnoses or Management Options  Diagnosis management comments: 28-year-old female presenting for evaluation of COVID like symptoms after exposure at work and multiple COVID positive resident at her work, she is currently afebrile, satting at 100%, appears nontoxic although she looks uncomfortable, also asking for 1 dose of Diflucan for yeast infection    Advised supportive management at home, return to work in 5 days or when asymptomatic, f/u with pcp as an outpatient    All imaging discussed with patient, strict return to ED precautions discussed  Pt verbalizes understanding and agrees with plan  Pt is stable for discharge    Portions of the record may have been created with voice recognition software   Occasional wrong word or "sound a like" substitutions may have occurred due to the inherent limitations of voice recognition software  Read the chart carefully and recognize, using context, where substitutions have occurred  Disposition  Final diagnoses:   Person under investigation for COVID-19   Exposure to COVID-19 virus     Time reflects when diagnosis was documented in both MDM as applicable and the Disposition within this note     Time User Action Codes Description Comment    7/18/2022  3:06 PM Nik Sunshine Add [Z20 822] Person under investigation for COVID-19     7/18/2022  3:06 PM Philmore Sunshine Add [Z20 822] Exposure to COVID-19 virus       ED Disposition     ED Disposition   Discharge    Condition   Stable    Date/Time   Mon Jul 18, 2022  3:05 PM    Comment   Iona Round discharge to home/self care                 Follow-up Information     Follow up With Specialties Details Why Contact Info Additional 350 [facility] Medicine Call in 1 day  [address], [address] [facility] [address]  [address], [address], [address] [place], [place], [place] [address] Heart Emergency Department Emergency Medicine Go to  If symptoms worsen [address] [facility] [address]  [facility] Heart Emergency Department          Patient's Medications   Discharge Prescriptions    ALBUTEROL (PROVENTIL HFA,VENTOLIN HFA) 90 MCG/ACT INHALER    Inhale 2 puffs every 4 (four) hours as needed for wheezing       Start Date: 7/18/2022 End Date: --       Order Dose: 2 puffs       Quantity: 8 g    Refills: 0    DEXTROMETHORPHAN-GUAIFENESIN (MUCINEX DM)  MG PER 12 HR TABLET    Take 1 tablet by mouth every 12 (twelve) hours       Start Date: 7/18/2022 End Date: --       Order Dose: 1 tablet       Quantity: 30 tablet    Refills: 0 No discharge procedures on file      PDMP Review     None          ED Provider  Electronically Signed by           Gilmar Hope PA-C  07/18/22 5332

## 2022-07-18 NOTE — Clinical Note
Silva Goldberg was seen and treated in our emergency department on 7/18/2022  Diagnosis:     Jyoti Mcrae  may return to work on return date  She may return on this date: 07/25/2022    Or when patient is asymptomatic from covid symptoms     If you have any questions or concerns, please don't hesitate to call        Colten Casey PA-C    ______________________________           _______________          _______________  Hospital Representative                              Date                                Time

## 2022-08-07 ENCOUNTER — HOSPITAL ENCOUNTER (EMERGENCY)
Facility: HOSPITAL | Age: 32
Discharge: HOME | End: 2022-08-07
Attending: EMERGENCY MEDICINE | Admitting: EMERGENCY MEDICINE

## 2022-08-07 VITALS
OXYGEN SATURATION: 99 % | SYSTOLIC BLOOD PRESSURE: 142 MMHG | RESPIRATION RATE: 16 BRPM | TEMPERATURE: 97.3 F | DIASTOLIC BLOOD PRESSURE: 83 MMHG | BODY MASS INDEX: 33.46 KG/M2 | HEART RATE: 88 BPM | WEIGHT: 190 LBS

## 2022-08-07 DIAGNOSIS — H57.89 EYE IRRITATION: Primary | ICD-10-CM

## 2022-08-07 PROCEDURE — 99284 EMERGENCY DEPT VISIT MOD MDM: CPT | Performed by: PHYSICIAN ASSISTANT

## 2022-08-07 PROCEDURE — 99283 EMERGENCY DEPT VISIT LOW MDM: CPT

## 2022-08-07 RX ORDER — ERYTHROMYCIN 5 MG/G
OINTMENT OPHTHALMIC
Qty: 3.5 G | Refills: 0 | Status: SHIPPED | OUTPATIENT
Start: 2022-08-07 | End: 2022-08-07 | Stop reason: SDUPTHER

## 2022-08-07 RX ORDER — ERYTHROMYCIN 5 MG/G
0.5 OINTMENT OPHTHALMIC ONCE
Status: COMPLETED | OUTPATIENT
Start: 2022-08-07 | End: 2022-08-07

## 2022-08-07 RX ORDER — ERYTHROMYCIN 5 MG/G
OINTMENT OPHTHALMIC
Qty: 3.5 G | Refills: 0 | Status: SHIPPED | OUTPATIENT
Start: 2022-08-07

## 2022-08-07 RX ADMIN — ERYTHROMYCIN 0.5 INCH: 5 OINTMENT OPHTHALMIC at 05:48

## 2022-08-07 NOTE — ED PROVIDER NOTES
History  Chief Complaint   Patient presents with    Eye Problem     Pt states that right contact is stuck in her eye     41-year-old female without significant past medical history presents complaining of foreign body sensation in the right eye  Patient states she believes her contact lenses stuck in her right eye  Tried taking it out at home without relief  Denies any other complaints  History provided by:  Patient   used: No        Prior to Admission Medications   Prescriptions Last Dose Informant Patient Reported? Taking?    Blood Glucose Monitoring Suppl (Glorine Finger) w/Device KIT   No No   Sig: Use daily to test blood sugar 4 times per day, fasting and 2 hours after the start of each meal    ONETOUCH VERIO test strip   No No   Sig: Test blood Sugar 4 times per day, fasting and 2 hours after the start of each meal    OneTouch Delica Lancets 66L MISC   No No   Sig: Test blood Sugar 4 times per day, fasting and 2 hours after the start of each meal    Prenatal Vit-Fe Fumarate-FA (PNV FOLIC ACID + IRON) 25-6 MG TABS  Self No No   Sig: Take 1 tablet by mouth daily   albuterol (PROVENTIL HFA,VENTOLIN HFA) 90 mcg/act inhaler   No No   Sig: Inhale 2 puffs every 6 (six) hours as needed for wheezing or shortness of breath   albuterol (PROVENTIL HFA,VENTOLIN HFA) 90 mcg/act inhaler   No No   Sig: Inhale 2 puffs every 4 (four) hours as needed for wheezing   dextromethorphan-guaifenesin (MUCINEX DM)  MG per 12 hr tablet   No No   Sig: Take 1 tablet by mouth every 12 (twelve) hours   ferrous sulfate 324 (65 Fe) mg  Self No No   Sig: Take daily at bedtime With orange juice   lurasidone (Latuda) 20 mg tablet  Self Yes No   Sig: Take 20 mg by mouth daily with breakfast      Facility-Administered Medications: None       Past Medical History:   Diagnosis Date    Anemia     Anxiety     Asthma     Bipolar 1 disorder (HCC)     Depression     Gestational diabetes mellitus (GDM) in third trimester 2020    Obesity     Psychiatric disorder     bipolar    Substance abuse ([facility] [address] )     UTI (urinary tract infection) 2019    Varicella     Child Hx       Past Surgical History:   Procedure Laterality Date     SECTION      [place]  DELIVERY ONLY Bilateral 10/21/2017    Procedure:  SECTION (); Surgeon: Nichole Mohs, MD;  [facility];  Service: Obstetrics    [place] LAP,DIAGNOSTIC ABDOMEN N/A 2016    Procedure: EXPLORATORY LAPAROTOMY, LEFT SALPINGECTOMY;  Surgeon: Benjamín Birch MD;  Location:  MAIN OR;  Service: Gynecology    [place] SURG RX MISSED ABORTN,1ST TRI N/A 7/10/2019    Procedure: DILATATION AND EVACUATION (D&E) (8 WEEKS); Surgeon: Kristopher Griffith MD;  Location: [facility];  Service: Gynecology       Family History   Problem Relation Age of Onset   Ivana Nunn HIV Mother     No Known Problems Father     Breast cancer Maternal Grandmother     Hypertension Maternal Grandmother     Diabetes Paternal Grandmother      I have reviewed and agree with the history as documented  E-Cigarette/Vaping    E-Cigarette Use Never User      E-Cigarette/Vaping Substances    Nicotine No     THC No     CBD No     Flavoring No     Other No     Unknown No      Social History     Tobacco Use    Smoking status: Former Smoker     Packs/day: 0 25     Years: 15 00     Pack years: 3 75    Smokeless tobacco: Never Used   Vaping Use    Vaping Use: Never used   Substance Use Topics    Alcohol use: Yes    Drug use: Yes     Types: Marijuana     Comment: medical        Review of Systems   Constitutional: Negative  Negative for chills and fatigue  HENT: Negative for ear pain and sore throat  Eyes: Positive for pain  Negative for photophobia and redness  Respiratory: Negative for apnea, cough and shortness of breath  Cardiovascular: Negative for chest pain  Gastrointestinal: Negative for abdominal pain, nausea and vomiting  Genitourinary: Negative for dysuria  Musculoskeletal: Negative for arthralgias, neck pain and neck stiffness  Skin: Negative for rash  Neurological: Negative for dizziness, tremors, syncope and weakness  Psychiatric/Behavioral: Negative for suicidal ideas  Physical Exam  Physical Exam  Constitutional:       General: She is not in acute distress  Appearance: She is well-developed  She is not diaphoretic  Eyes:      Pupils: Pupils are equal, round, and reactive to light  Comments: Right eye thoroughly examined by myself and nursing staff with tetracaine without evidence of foreign body  Eyelid was everted and no foreign body was appreciated  EOMs intact  No evidence of global injury  Cardiovascular:      Rate and Rhythm: Normal rate and regular rhythm  Pulmonary:      Effort: Pulmonary effort is normal  No respiratory distress  Breath sounds: Normal breath sounds  Abdominal:      General: Bowel sounds are normal  There is no distension  Palpations: Abdomen is soft  Musculoskeletal:         General: Normal range of motion  Cervical back: Normal range of motion and neck supple  Skin:     General: Skin is warm and dry  Neurological:      Mental Status: She is alert and oriented to person, place, and time           Vital Signs  ED Triage Vitals [08/07/22 0507]   Temperature Pulse Respirations Blood Pressure SpO2   (!) 97 3 °F (36 3 °C) 88 16 142/83 99 %      Temp Source Heart Rate Source Patient Position - Orthostatic VS BP Location FiO2 (%)   Oral Monitor Sitting Left arm --      Pain Score       No Pain           Vitals:    08/07/22 0507   BP: 142/83   Pulse: 88   Patient Position - Orthostatic VS: Sitting         Visual Acuity      ED Medications  Medications   erythromycin (ILOTYCIN) 0 5 % ophthalmic ointment 0 5 inch (0 5 inches Right Eye Given 8/7/22 2104)       Diagnostic Studies  Results Reviewed     None                 No orders to display              Procedures  Procedures         ED Course MDM  Number of Diagnoses or Management Options  Diagnosis management comments: No evidence of foreign body noted on exam   Patient was given Ophthalmology follow-up will start antibiotics  Stable for discharge home  Risk of Complications, Morbidity, and/or Mortality  Presenting problems: moderate  Diagnostic procedures: moderate  Management options: moderate    Patient Progress  Patient progress: stable      Disposition  Final diagnoses:   Eye irritation     Time reflects when diagnosis was documented in both MDM as applicable and the Disposition within this note     Time User Action Codes Description Comment    8/7/2022  5:45 AM Lunda Civil Add [H57 89] Eye irritation       ED Disposition     ED Disposition   Discharge    Condition   Stable    Date/Time   Sun Aug 7, 2022  5:45 AM    Comment   Shital Payer discharge to home/self care  Follow-up Information     Follow up With Specialties Details Why Contact Info Additional [address] [facility] Emergency Department Emergency Medicine Go to  If symptoms worsen [address] [id number] 00[address] [facility] Emergency Department    Enzo Sue Ophthalmology Schedule an appointment as soon as possible for a visit in 3 days If symptoms worsen [address]  [address] [facility] [id number] [phone]             Patient's Medications   Discharge Prescriptions    ERYTHROMYCIN (ILOTYCIN) OPHTHALMIC OINTMENT    Place a 1/2 inch ribbon of ointment into the lower eyelid  Start Date: 8/7/2022  End Date: --       Order Dose: --       Quantity: 3 5 g    Refills: 0       No discharge procedures on file      PDMP Review     None          ED Provider  Electronically Signed by           Nando Dumont PA-C  08/07/22 7807

## 2022-09-19 ENCOUNTER — APPOINTMENT (OUTPATIENT)
Dept: LAB | Facility: HOSPITAL | Age: 32
End: 2022-09-19
Payer: MEDICARE

## 2022-09-19 DIAGNOSIS — Z79.899 ENCOUNTER FOR LONG-TERM (CURRENT) USE OF OTHER MEDICATIONS: ICD-10-CM

## 2022-09-19 LAB
CHOLEST SERPL-MCNC: 218 MG/DL
GLUCOSE P FAST SERPL-MCNC: 93 MG/DL (ref 70–99)
HDLC SERPL-MCNC: 66 MG/DL
LDLC SERPL CALC-MCNC: 136 MG/DL
NONHDLC SERPL-MCNC: 152 MG/DL
TRIGL SERPL-MCNC: 78 MG/DL

## 2022-09-19 PROCEDURE — 80061 LIPID PANEL: CPT

## 2022-09-19 PROCEDURE — 82947 ASSAY GLUCOSE BLOOD QUANT: CPT

## 2022-09-19 PROCEDURE — 36415 COLL VENOUS BLD VENIPUNCTURE: CPT

## 2023-04-24 ENCOUNTER — HOSPITAL ENCOUNTER (EMERGENCY)
Facility: HOSPITAL | Age: 33
Discharge: HOME | End: 2023-04-24
Attending: EMERGENCY MEDICINE | Admitting: EMERGENCY MEDICINE

## 2023-04-24 VITALS
DIASTOLIC BLOOD PRESSURE: 85 MMHG | SYSTOLIC BLOOD PRESSURE: 172 MMHG | HEART RATE: 69 BPM | BODY MASS INDEX: 36.65 KG/M2 | RESPIRATION RATE: 16 BRPM | WEIGHT: 208.11 LBS | TEMPERATURE: 98.6 F | OXYGEN SATURATION: 98 %

## 2023-04-24 DIAGNOSIS — L72.0 EPIDERMAL CYST: ICD-10-CM

## 2023-04-24 DIAGNOSIS — L02.213 CHEST WALL ABSCESS: Primary | ICD-10-CM

## 2023-04-24 LAB
EXT PREGNANCY TEST URINE: NEGATIVE
EXT. CONTROL: NORMAL

## 2023-04-24 RX ORDER — IBUPROFEN 600 MG/1
600 TABLET ORAL EVERY 6 HOURS PRN
Qty: 30 TABLET | Refills: 0 | Status: SHIPPED | OUTPATIENT
Start: 2023-04-24 | End: 2023-05-04

## 2023-04-24 RX ORDER — SULFAMETHOXAZOLE AND TRIMETHOPRIM 800; 160 MG/1; MG/1
1 TABLET ORAL 2 TIMES DAILY
Qty: 14 TABLET | Refills: 0 | Status: SHIPPED | OUTPATIENT
Start: 2023-04-24 | End: 2023-05-01

## 2023-04-24 RX ORDER — CEPHALEXIN 500 MG/1
500 CAPSULE ORAL 4 TIMES DAILY
Qty: 40 CAPSULE | Refills: 0 | Status: SHIPPED | OUTPATIENT
Start: 2023-04-24 | End: 2023-05-04

## 2023-04-24 RX ORDER — LIDOCAINE HYDROCHLORIDE 10 MG/ML
5 INJECTION, SOLUTION EPIDURAL; INFILTRATION; INTRACAUDAL; PERINEURAL ONCE
Status: COMPLETED | OUTPATIENT
Start: 2023-04-24 | End: 2023-04-24

## 2023-04-24 RX ADMIN — LIDOCAINE HYDROCHLORIDE 5 ML: 10 INJECTION, SOLUTION EPIDURAL; INFILTRATION; INTRACAUDAL; PERINEURAL at 13:08

## 2023-04-24 NOTE — ED PROVIDER NOTES
History  Chief Complaint   Patient presents with   • Mass     Pt reports lump on breast, reports it was small but now getting bigger and starting to hurt  Pt reports no drainage      Patient presents emergency room with a lump in her left chest wall swelling for several months intermittent over the past 2 weeks is having daily increasingly bigger and is painful  No fevers or chills  No injury or trauma to the area  Patient has been putting warm compresses on it but it keeps getting bigger  Prior to Admission Medications   Prescriptions Last Dose Informant Patient Reported? Taking? Blood Glucose Monitoring Suppl (Ricardo Khan) w/Device KIT   No No   Sig: Use daily to test blood sugar 4 times per day, fasting and 2 hours after the start of each meal    ONETOUCH VERIO test strip   No No   Sig: Test blood Sugar 4 times per day, fasting and 2 hours after the start of each meal    OneTouch Delica Lancets 57D MISC   No No   Sig: Test blood Sugar 4 times per day, fasting and 2 hours after the start of each meal    Prenatal Vit-Fe Fumarate-FA (PNV FOLIC ACID + IRON) 33-9 MG TABS   No No   Sig: Take 1 tablet by mouth daily   albuterol (PROVENTIL HFA,VENTOLIN HFA) 90 mcg/act inhaler   No No   Sig: Inhale 2 puffs every 6 (six) hours as needed for wheezing or shortness of breath   albuterol (PROVENTIL HFA,VENTOLIN HFA) 90 mcg/act inhaler   No No   Sig: Inhale 2 puffs every 4 (four) hours as needed for wheezing   dextromethorphan-guaifenesin (MUCINEX DM)  MG per 12 hr tablet   No No   Sig: Take 1 tablet by mouth every 12 (twelve) hours   erythromycin (ILOTYCIN) ophthalmic ointment   No No   Sig: Place a 1/2 inch ribbon of ointment into the lower eyelid     ferrous sulfate 324 (65 Fe) mg   No No   Sig: Take daily at bedtime With orange juice   lurasidone (Latuda) 20 mg tablet   Yes No   Sig: Take 20 mg by mouth daily with breakfast      Facility-Administered Medications: None       Past Medical History: Diagnosis Date   • Anemia    • Anxiety    • Asthma    • Bipolar 1 disorder ([facility] [address] )    • Depression    • Gestational diabetes mellitus (GDM) in third trimester 2020   • Obesity    • Psychiatric disorder     bipolar   • Substance abuse ([facility] )    • UTI (urinary tract infection) 2019   • Varicella     Child Hx       Past Surgical History:   Procedure Laterality Date   •  SECTION     • [place]  DELIVERY ONLY Bilateral 10/21/2017    Procedure:  SECTION (); Surgeon: Cameron Hickman MD;  Location: [facility];  Service: Obstetrics   • [place] LAPS ABD PRTM&OMENTUM DX W/WO SPEC BR/WA SPX N/A 2016    Procedure: EXPLORATORY LAPAROTOMY, LEFT SALPINGECTOMY;  Surgeon: Elenita Estrada MD;  Location: [facility];  Service: Gynecology   • [place] TX MISSED  FIRST TRIMESTER SURGICAL N/A 7/10/2019    Procedure: DILATATION AND EVACUATION (D&E) (8 WEEKS); Surgeon: Cora Brower MD;  Location: [facility];  Service: Gynecology       Family History   Problem Relation Age of Onset   • HIV Mother    • No Known Problems Father    • Breast cancer Maternal Grandmother    • Hypertension Maternal Grandmother    • Diabetes Paternal Grandmother      I have reviewed and agree with the history as documented  E-Cigarette/Vaping   • E-Cigarette Use Never User      E-Cigarette/Vaping Substances   • Nicotine No    • THC No    • CBD No    • Flavoring No    • Other No    • Unknown No      Social History     Tobacco Use   • Smoking status: Former     Packs/day: 0 25     Years: 15 00     Pack years: 3 75     Types: Cigarettes   • Smokeless tobacco: Never   Vaping Use   • Vaping Use: Never used   Substance Use Topics   • Alcohol use: Yes   • Drug use: Yes     Types: Marijuana     Comment: medical        Review of Systems   Constitutional: Negative for chills and fever  Respiratory: Negative  Cardiovascular: Negative  Skin: Positive for wound  All other systems reviewed and are negative        Physical Exam  Physical Exam  Vitals and nursing note reviewed  Constitutional:       Appearance: She is well-developed  HENT:      Head: Normocephalic and atraumatic  Right Ear: Tympanic membrane and external ear normal       Left Ear: Tympanic membrane and external ear normal    Eyes:      Conjunctiva/sclera: Conjunctivae normal    Cardiovascular:      Rate and Rhythm: Normal rate and regular rhythm  Heart sounds: Normal heart sounds  Pulmonary:      Effort: Pulmonary effort is normal       Breath sounds: Normal breath sounds  Chest:       Abdominal:      General: Bowel sounds are normal       Palpations: Abdomen is soft  Musculoskeletal:         General: Normal range of motion  Cervical back: Neck supple  Lymphadenopathy:      Cervical: No cervical adenopathy  Skin:     General: Skin is warm  Findings: No rash  Neurological:      Mental Status: She is alert  Psychiatric:         Behavior: Behavior normal          Vital Signs  ED Triage Vitals [04/24/23 1207]   Temperature Pulse Respirations Blood Pressure SpO2   98 6 °F (37 °C) 69 16 (!) 172/85 98 %      Temp src Heart Rate Source Patient Position - Orthostatic VS BP Location FiO2 (%)   -- Monitor Sitting Right arm --      Pain Score       --           Vitals:    04/24/23 1207   BP: (!) 172/85   Pulse: 69   Patient Position - Orthostatic VS: Sitting         Visual Acuity      ED Medications  Medications   lidocaine (PF) (XYLOCAINE-MPF) 1 % injection 5 mL (5 mL Infiltration Given by Other 4/24/23 1308)       Diagnostic Studies  Results Reviewed     Procedure Component Value Units Date/Time    POCT pregnancy, urine [[id number]]  (Normal) Resulted: 04/24/23 1413    Lab Status: Final result Updated: 04/24/23 1424     EXT Preg Test, Ur Negative     Control Valid    Wound culture and Gram stain [[id number]] Collected: 04/24/23 1412    Lab Status:  In process Specimen: Wound from Breast, Left Updated: 04/24/23 1423                 No orders to "display              Procedures  Incision and drain    Date/Time: 4/24/2023 2:07 PM  Performed by: Yuridia Kee PA-C  Authorized by: Yuridia Kee PA-C   Universal Protocol:  Patient identity confirmed: verbally with patient      Patient location:  ED  Location:     Type:  Abscess  Pre-procedure details:     Skin preparation:  Alcohol  Anesthesia (see MAR for exact dosages): Anesthesia method:  Local infiltration    Local anesthetic:  Lidocaine 1% w/o epi  Procedure details:     Incision types:  Stab incision    Scalpel blade:  11    Drainage:  Purulent    Drainage amount: Moderate    Wound treatment:  Wound left open  Post-procedure details:     Patient tolerance of procedure: Tolerated well, no immediate complications  Comments:      Wound culture taken              ED Course                               SBIRT 20yo+    Flowsheet Row Most Recent Value   Initial Alcohol Screen: US AUDIT-C     1  How often do you have a drink containing alcohol? 0 Filed at: 04/24/2023 1206   3b  FEMALE Any Age, or MALE 65+: How often do you have 4 or more drinks on one occassion? 0 Filed at: 04/24/2023 1206   Audit-C Score 0 Filed at: 04/24/2023 1206   NAM: How many times in the past year have you    Used an illegal drug or used a prescription medication for non-medical reasons? Daily or Almost Daily Filed at: 04/24/2023 1206   DAST-10: In the past 12 months       1  Have you used drugs other than those required for medical reasons? 1 Filed at: 04/24/2023 1206   2  Do you use more than one drug at a time? 0 Filed at: 04/24/2023 1206   3  Have you had medical problems as a result of your drug use (e g , memory loss, hepatitis, convulsions, bleeding, etc )? 0 Filed at: 04/24/2023 1206   4  Have you had \"blackouts\" or \"flashbacks\" as a result of drug use? YesNo 0 Filed at: 04/24/2023 1206   5  Do you ever feel bad or guilty about your drug use? 0 Filed at: 04/24/2023 1206   6   Does your spouse (or parent) ever " complain about your involvement with drugs? 0 Filed at: 04/24/2023 1206   7  Have you neglected your family because of your use of drugs? 0 Filed at: 04/24/2023 1206   8  Have you engaged in illegal activities in order to obtain drugs? 0 Filed at: 04/24/2023 1206   9  Have you ever experienced withdrawal symptoms (felt sick) when you stopped taking drugs? 0 Filed at: 04/24/2023 1206   10  Are you always able to stop using drugs when you want to? 0 Filed at: 04/24/2023 1206   DAST-10 Score 1 Filed at: 04/24/2023 1206                    Medical Decision Making  Discussed various treatment options including warm compresses and little incision versus lancing  Patient excepts drainage  Instructions reviewed discussed importance of FU and FU with surgery  Discussed FU with her DR/for eladio     Chest wall abscess: acute illness or injury  Epidermal cyst: acute illness or injury  Amount and/or Complexity of Data Reviewed  Labs: ordered  Risk  Prescription drug management  Disposition  Final diagnoses:   Chest wall abscess   Epidermal cyst - infected -chest wall     Time reflects when diagnosis was documented in both MDM as applicable and the Disposition within this note     Time User Action Codes Description Comment    4/24/2023  1:58 PM Alise Abdi [L02 213] Chest wall abscess     4/24/2023  1:58 PM Alise Abdi Add [L72 0] Epidermal cyst     4/24/2023  1:59 PM Alise Abdi Modify [L72 0] Epidermal cyst infected -chest wall      ED Disposition     ED Disposition   Discharge    Condition   Stable    Date/Time   Mon Apr 24, 2023  1:58 PM    Comment   Darrius Prescott discharge to home/self care                 Follow-up Information     Follow up With Specialties Details Why [address] [place] [address]  [phone]            Discharge Medication List as of 4/24/2023  2:06 PM      START taking these medications    Details cephalexin (KEFLEX) 500 mg capsule Take 1 capsule (500 mg total) by mouth 4 (four) times a day for 10 days, Starting Mon 4/24/2023, Until Thu 5/4/2023, Normal      ibuprofen (MOTRIN) 600 mg tablet Take 1 tablet (600 mg total) by mouth every 6 (six) hours as needed for mild pain for up to 10 days, Starting Mon 4/24/2023, Until Thu 5/4/2023 at 2359, Normal      sulfamethoxazole-trimethoprim (BACTRIM DS) 800-160 mg per tablet Take 1 tablet by mouth 2 (two) times a day for 7 days, Starting Mon 4/24/2023, Until Mon 5/1/2023, Normal         CONTINUE these medications which have NOT CHANGED    Details   !! albuterol (PROVENTIL HFA,VENTOLIN HFA) 90 mcg/act inhaler Inhale 2 puffs every 6 (six) hours as needed for wheezing or shortness of breath, Starting Tue 7/14/2020, Normal      !! albuterol (PROVENTIL HFA,VENTOLIN HFA) 90 mcg/act inhaler Inhale 2 puffs every 4 (four) hours as needed for wheezing, Starting Mon 7/18/2022, Print      Blood Glucose Monitoring Suppl (ONETOUCH VERIO) w/Device KIT Use daily to test blood sugar 4 times per day, fasting and 2 hours after the start of each meal , Normal      dextromethorphan-guaifenesin (MUCINEX DM)  MG per 12 hr tablet Take 1 tablet by mouth every 12 (twelve) hours, Starting Mon 7/18/2022, Print      erythromycin (ILOTYCIN) ophthalmic ointment Place a 1/2 inch ribbon of ointment into the lower eyelid  , Normal      ferrous sulfate 324 (65 Fe) mg Take daily at bedtime With orange juice, Normal      lurasidone (Latuda) 20 mg tablet Take 20 mg by mouth daily with breakfast, Historical Med      OneTouch Delica Lancets 96J MISC Test blood Sugar 4 times per day, fasting and 2 hours after the start of each meal , Normal      ONETOUCH VERIO test strip Test blood Sugar 4 times per day, fasting and 2 hours after the start of each meal , Normal      Prenatal Vit-Fe Fumarate-FA (PNV FOLIC ACID + IRON) 98-8 MG TABS Take 1 tablet by mouth daily, Starting Thu 5/21/2020, Normal       !! - Potential duplicate medications found  Please discuss with provider  No discharge procedures on file      PDMP Review     None          ED Provider  Electronically Signed by           Daria Whipple PA-C  04/24/23 1329

## 2023-04-24 NOTE — DISCHARGE INSTRUCTIONS
Warm compresses to the area  Take your antibiotics as directed  FU with your doctor or return to the ED in 2-3 days for a recheck  Watch for sign of worsening infection including fevers, worsening pain, spread of redness or increased drainage - if you see these signs return sooner  FU with surgery/your doctor

## 2023-04-25 ENCOUNTER — VBI (OUTPATIENT)
Dept: FAMILY MEDICINE CLINIC | Facility: CLINIC | Age: 33
End: 2023-04-25

## 2023-04-25 NOTE — TELEPHONE ENCOUNTER
Darrius Prescott    ED Visit Information     Ed visit date: 4/24/2023  Diagnosis Description: CHEST WALL ABCESS      Value Base Outreach    04/25/2023 11:39 AM EDT by Jann Colón 04/25/2023 11:39 AM EDT by Deanne Ratliff Scale (Self) [phone] (Mobile)  [phone] (Mobile) Remove  - Left Message    04/26/2023 09:47 AM EDT by Jann Colón 04/26/2023 09:47 AM EDT by Deanne Ratliff Scale (Self) [phone] (Mobile)  [phone] (Mobile) Remove  - Left MessageCommunicated - letter sent

## 2023-04-25 NOTE — LETTER
[address] [facility] [address] [facility] [address]    Date: 04/26/23  Griselda Garcia U  49  [id number]    Dear Noreen No: Thank you for choosing [facility] emergency department for care  As your primary care provider we want to make sure that your ongoing medical care is being addressed  If you require follow up care as a result of your emergency department visit, there are a few things we would like you to know  As part of our continuing commitment to caring for our patient, we have added more same day appointments and have extended our office hours to meet your medical needs  After hours, on-call physicians are available via our main office line  We encourage you to contact our office prior to seeking treatment to discuss your symptoms with our medical staff  Together, we can determine the correct course of action  A majority of non-emergent conditions such as: common cold, flu-like symptoms, fevers, strains/sprains, dislocations, minor burns, cuts and animal bites can be treated at [facility] facilities  Diagnostic testing is available at some sites  Of course, if you are experiencing a life threatening medical emergency call 911 or proceed directly to the nearest emergency room      Your nearest [facility] [employer] [facility] facility is conveniently located at:    [address] [place] [address] [facility]  [address] [facility] [address], [address] [facility]  [phone] 5266 [facility] offered at [facility] 166 your spot online at www [facility] org/care-now/locations or on the [facility]    Sincerely,    [address]  Dept: [phone]

## 2023-04-27 LAB
BACTERIA WND AEROBE CULT: NORMAL
GRAM STN SPEC: NORMAL

## 2023-10-20 ENCOUNTER — HOSPITAL ENCOUNTER (EMERGENCY)
Facility: HOSPITAL | Age: 33
Discharge: HOME | End: 2023-10-23
Attending: EMERGENCY MEDICINE | Admitting: EMERGENCY MEDICINE
Payer: MEDICARE

## 2023-10-20 DIAGNOSIS — R45.850 HOMICIDAL IDEATION: Primary | ICD-10-CM

## 2023-10-20 DIAGNOSIS — F29 PSYCHOSIS (HCC): ICD-10-CM

## 2023-10-20 DIAGNOSIS — R45.1 AGITATION: ICD-10-CM

## 2023-10-20 DIAGNOSIS — Z00.8 ENCOUNTER FOR PSYCHOLOGICAL EVALUATION: ICD-10-CM

## 2023-10-20 DIAGNOSIS — F31.9 BIPOLAR 1 DISORDER (HCC): ICD-10-CM

## 2023-10-20 LAB
AMPHETAMINES SERPL QL SCN: NEGATIVE
BARBITURATES UR QL: NEGATIVE
BENZODIAZ UR QL: NEGATIVE
COCAINE UR QL: NEGATIVE
ETHANOL EXG-MCNC: 0.01 MG/DL
EXT PREGNANCY TEST URINE: NEGATIVE
EXT. CONTROL: NORMAL
OPIATES UR QL SCN: NEGATIVE
OXYCODONE+OXYMORPHONE UR QL SCN: NEGATIVE
PCP UR QL: NEGATIVE
THC UR QL: POSITIVE

## 2023-10-20 PROCEDURE — 82075 ASSAY OF BREATH ETHANOL: CPT

## 2023-10-20 PROCEDURE — 81025 URINE PREGNANCY TEST: CPT

## 2023-10-20 PROCEDURE — 99291 CRITICAL CARE FIRST HOUR: CPT | Performed by: EMERGENCY MEDICINE

## 2023-10-20 PROCEDURE — 80307 DRUG TEST PRSMV CHEM ANLYZR: CPT

## 2023-10-20 PROCEDURE — 99282 EMERGENCY DEPT VISIT SF MDM: CPT

## 2023-10-20 PROCEDURE — 96372 THER/PROPH/DIAG INJ SC/IM: CPT

## 2023-10-20 RX ORDER — HALOPERIDOL 5 MG/ML
5 INJECTION INTRAMUSCULAR ONCE
Status: DISCONTINUED | OUTPATIENT
Start: 2023-10-20 | End: 2023-10-23 | Stop reason: HOSPADM

## 2023-10-20 RX ORDER — HALOPERIDOL 5 MG/ML
5 INJECTION INTRAMUSCULAR ONCE
Status: COMPLETED | OUTPATIENT
Start: 2023-10-20 | End: 2023-10-20

## 2023-10-20 RX ORDER — LORAZEPAM 2 MG/ML
2 INJECTION INTRAMUSCULAR ONCE
Status: DISCONTINUED | OUTPATIENT
Start: 2023-10-20 | End: 2023-10-23 | Stop reason: HOSPADM

## 2023-10-20 RX ORDER — BENZTROPINE MESYLATE 1 MG/ML
1 INJECTION INTRAMUSCULAR; INTRAVENOUS ONCE
Status: COMPLETED | OUTPATIENT
Start: 2023-10-20 | End: 2023-10-20

## 2023-10-20 RX ORDER — LORAZEPAM 2 MG/ML
2 INJECTION INTRAMUSCULAR ONCE
Status: DISCONTINUED | OUTPATIENT
Start: 2023-10-20 | End: 2023-10-20

## 2023-10-20 RX ORDER — LORAZEPAM 2 MG/ML
2 INJECTION INTRAMUSCULAR ONCE
Status: COMPLETED | OUTPATIENT
Start: 2023-10-20 | End: 2023-10-20

## 2023-10-20 RX ADMIN — HALOPERIDOL LACTATE 5 MG: 5 INJECTION, SOLUTION INTRAMUSCULAR at 12:33

## 2023-10-20 RX ADMIN — LORAZEPAM 2 MG: 2 INJECTION INTRAMUSCULAR; INTRAVENOUS at 12:33

## 2023-10-20 RX ADMIN — BENZTROPINE MESYLATE 1 MG: 1 INJECTION INTRAMUSCULAR; INTRAVENOUS at 12:30

## 2023-10-20 NOTE — ED NOTES
Insurance Authorization for Admission:  Phone Call Placed to [employer]. Phone Number [phone]. Spoke to [employer]  4 Days Approved. Level of Care AIP- 302. Review on TBD  Authorization #Accepting facility to call upon admission.     EVS (Eligibility Verification System) Called- [phone]  Automated System Indicates Active with [facility]  Crisis Intervention Specialist II  10/20/23

## 2023-10-20 NOTE — ED NOTES
Bed Search:    [facility]- Will review, clinical faxed  [facility]- No beds  [address] - No beds  [facility]- Will review, clinical faxed  [facility]- Will review- Clinical faxed  [facility]- No beds    Sae Vanegas  Crisis Intervention Specialist II  10/20/23

## 2023-10-20 NOTE — ED ATTENDING ATTESTATION
10/20/2023  IGagan MD, saw and evaluated the patient. I have discussed the patient with the resident/non-physician practitioner and agree with the resident's/non-physician practitioner's findings, Plan of Care, and MDM as documented in the resident's/non-physician practitioner's note, except where noted. All available labs and Radiology studies were reviewed. I was present for key portions of any procedure(s) performed by the resident/non-physician practitioner and I was immediately available to provide assistance. At this point I agree with the current assessment done in the Emergency Department. I have conducted an independent evaluation of this patient a history and physical is as follows:    ED Course     56-year-old female brought in by police and [facility] after police were called to patient's apartment for bizarre and threatening behavior. Patient has reported history of schizophrenia/psychosis, unclear if she is currently taking medications. Patient verbally abusive/aggressive with staff. Screaming with foul language, some Zoroastrian preoccupation talking about devils and Satan. 302 pending from law enforcement. 302 petition from local police reviewed. Patient continues to be severely agitated, responding to internal stimuli, threatening violence against staff. 302 position upheld. IM medications ordered. Critical Care Time  CriticalCare Time    Date/Time: 10/20/2023 4:11 PM    Performed by: Gagan Garza MD  Authorized by: Gagan Garza MD    Critical care provider statement:     Critical care time (minutes):  45    Critical care time was exclusive of:  Separately billable procedures and treating other patients and teaching time    Critical care was necessary to treat or prevent imminent or life-threatening deterioration of the following conditions: severe agitation.     Critical care was time spent personally by me on the following activities:  Evaluation of patient's response to treatment, obtaining history from patient or surrogate and ordering and performing treatments and interventions    I assumed direction of critical care for this patient from another provider in my specialty: no
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
## 2023-10-20 NOTE — ED NOTES
Pt resting quietly on litter, woke easily to RN entering for V/S. Pt returned to resting state after assessment. 1:1 continues.      Shell Jerry RN  10/20/23 3322

## 2023-10-20 NOTE — ED NOTES
Patient declined to sign 201. Petition of 302 completed by ANAYA Claire. 302 upheld by Dr. Althea Gonsalez. Rights read to and explained to patient. Completed 302 faxed to [address] [place] [address].     Josh Ornelas  Crisis Intervention Specialist II  10/20/23

## 2023-10-20 NOTE — RESTRAINT FACE TO FACE
Restraint Face to Face   Annis Gowers 29 y.o. female MRN: [id number]  Unit/Bed#: SH-22 Encounter: [id number]      Physical Evaluation: exhibiting aggressive behavior   Purpose for Restraints/ Seclusion High risk for self harm, High risk for causing significant disruption of treatment environment , High risk for harm to others, and high risk for flight  Patient's reaction to the intervention: stable  Patient's medical condition: N/A  Patient's Behavioral condition: aggressive, verbally aggressive, stating "I am going to kill ya'll.  As soon as these  leave I am going to drop you"   Restraints to be Continued

## 2023-10-20 NOTE — ED NOTES
Pt asleep at this time. Will obtain vitals when pt awakes.  No signs of distress noted     Renata Hilario RN  10/20/23 9060
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
## 2023-10-20 NOTE — ED NOTES
Patient presents to the Emergency Department via [facility] and upon arrival was making threats and uncooperative. Patient was medicated at that time. Upon approach patient is easily woken, and agrees to speak with this writer. Patient lacks insight into her mental health and her behaviors that caused her to be brought to the Emergency Department. Patient offers limited information. Patient is oriented to time, place and person, has a flat affect at present, speaks softly and is in a depressed mood. Patient reports she does not know why she was brought here, but acknowledges that she may have made threats toward people lately. Patient reports she lives alone, has no supports in the area. Patient reports she does not currently take any psychiatric medications or follow up with outpatient therapy or psychiatry. Patient reports she was in a psychiatric facility in June of 2023. Patient denies suicidal ideation, homicidal ideation and auditory/visual/tactile hallucinations at present. Patient denies drug, alcohol and tobacco consumption. Patient was offered a 201 for voluntary inpatient psychiatric treatment but patient does not believe she needs treatment at present. Patient was notified that [facility] Department petitioned a [id number] and the ED doctor was willing to uphold the 302. Patient acknowledges that she has been 302'd in the past. Patient states she is tired and wants to be out of her restraints. Patient reports she will cooperate with RN requests and remain cooperative.     Rodo Kemp  Crisis Intervention Specialist II  10/20/23

## 2023-10-20 NOTE — ED NOTES
Call received from [address] [place] [address] to review patient for potential placement- Clinical faxed at this time.     Keturah Zamora  Crisis Intervention Specialist II  10/20/23

## 2023-10-21 LAB
ALBUMIN SERPL BCP-MCNC: 4.1 G/DL (ref 3.5–5)
ALP SERPL-CCNC: 74 U/L (ref 34–104)
ALT SERPL W P-5'-P-CCNC: 13 U/L (ref 7–52)
ANION GAP SERPL CALCULATED.3IONS-SCNC: 8 MMOL/L
AST SERPL W P-5'-P-CCNC: 21 U/L (ref 13–39)
BASOPHILS # BLD AUTO: 0.05 THOUSANDS/ÂΜL (ref 0–0.1)
BASOPHILS NFR BLD AUTO: 1 % (ref 0–1)
BILIRUB SERPL-MCNC: 1.07 MG/DL (ref 0.2–1)
BUN SERPL-MCNC: 9 MG/DL (ref 5–25)
CALCIUM SERPL-MCNC: 9 MG/DL (ref 8.4–10.2)
CHLORIDE SERPL-SCNC: 105 MMOL/L (ref 96–108)
CO2 SERPL-SCNC: 24 MMOL/L (ref 21–32)
CREAT SERPL-MCNC: 0.9 MG/DL (ref 0.6–1.3)
EOSINOPHIL # BLD AUTO: 0.1 THOUSAND/ÂΜL (ref 0–0.61)
EOSINOPHIL NFR BLD AUTO: 2 % (ref 0–6)
ERYTHROCYTE [DISTWIDTH] IN BLOOD BY AUTOMATED COUNT: 14.3 % (ref 11.6–15.1)
GFR SERPL CREATININE-BSD FRML MDRD: 84 ML/MIN/1.73SQ M
GLUCOSE SERPL-MCNC: 102 MG/DL (ref 65–140)
HCT VFR BLD AUTO: 39.1 % (ref 34.8–46.1)
HGB BLD-MCNC: 12.2 G/DL (ref 11.5–15.4)
IMM GRANULOCYTES # BLD AUTO: 0.03 THOUSAND/UL (ref 0–0.2)
IMM GRANULOCYTES NFR BLD AUTO: 1 % (ref 0–2)
LYMPHOCYTES # BLD AUTO: 2.48 THOUSANDS/ÂΜL (ref 0.6–4.47)
LYMPHOCYTES NFR BLD AUTO: 39 % (ref 14–44)
MCH RBC QN AUTO: 28.6 PG (ref 26.8–34.3)
MCHC RBC AUTO-ENTMCNC: 31.2 G/DL (ref 31.4–37.4)
MCV RBC AUTO: 92 FL (ref 82–98)
MONOCYTES # BLD AUTO: 0.51 THOUSAND/ÂΜL (ref 0.17–1.22)
MONOCYTES NFR BLD AUTO: 8 % (ref 4–12)
NEUTROPHILS # BLD AUTO: 3.26 THOUSANDS/ÂΜL (ref 1.85–7.62)
NEUTS SEG NFR BLD AUTO: 49 % (ref 43–75)
NRBC BLD AUTO-RTO: 0 /100 WBCS
PLATELET # BLD AUTO: 216 THOUSANDS/UL (ref 149–390)
PMV BLD AUTO: 10.3 FL (ref 8.9–12.7)
POTASSIUM SERPL-SCNC: 3.2 MMOL/L (ref 3.5–5.3)
PROT SERPL-MCNC: 7.2 G/DL (ref 6.4–8.4)
RBC # BLD AUTO: 4.27 MILLION/UL (ref 3.81–5.12)
SODIUM SERPL-SCNC: 137 MMOL/L (ref 135–147)
WBC # BLD AUTO: 6.43 THOUSAND/UL (ref 4.31–10.16)

## 2023-10-21 PROCEDURE — 85025 COMPLETE CBC W/AUTO DIFF WBC: CPT | Performed by: PHYSICIAN ASSISTANT

## 2023-10-21 PROCEDURE — 80053 COMPREHEN METABOLIC PANEL: CPT | Performed by: PHYSICIAN ASSISTANT

## 2023-10-21 PROCEDURE — 36415 COLL VENOUS BLD VENIPUNCTURE: CPT | Performed by: PHYSICIAN ASSISTANT

## 2023-10-21 PROCEDURE — 99244 OFF/OP CNSLTJ NEW/EST MOD 40: CPT | Performed by: GENERAL PRACTICE

## 2023-10-21 RX ORDER — POTASSIUM CHLORIDE 20 MEQ/1
20 TABLET, EXTENDED RELEASE ORAL ONCE
Status: COMPLETED | OUTPATIENT
Start: 2023-10-21 | End: 2023-10-21

## 2023-10-21 RX ADMIN — POTASSIUM CHLORIDE 20 MEQ: 1500 TABLET, EXTENDED RELEASE ORAL at 11:27

## 2023-10-21 NOTE — ED NOTES
CIS continuing bed search, CIS called [facility]entes and spoke with [facility] (admissions) who informed me that patient has been accepted pending labs, CIS then spoke with patient about have her labs drawn, patient them refused, CIS spoke with ED Nurse who also spoke with patient and patient refused. ED nurse then informed the ED Provider who spoke with patient and patient has agreed to have her labs drawn.     Rachelle Medeiros  Crisis Intervention Specialist II

## 2023-10-21 NOTE — ED NOTES
Spoke with Laura Ocampo from admissions at [facility] regarding the referral of this patient. She stated they are unable to accept due to the patient's acuity.

## 2023-10-21 NOTE — ED NOTES
Call placed to [facility] to inform them of patient refusal for blood work/labs. CIS Unable to get through to admissions 2x. CIS to attempt to follow up at later time.     Phillip Byers  Crisis Intervention Specialist II  10/20/23

## 2023-10-21 NOTE — ED NOTES
Patient refusing blood work for placement. Patient started screaming that she doesn't trust people in this town and that we are giving all of our secrets out. Patient then began saying she wanted to speak to the president of the hospital and she was not going to be nice to anyone.      Dana Crockett RN  10/21/23 5628

## 2023-10-21 NOTE — ED NOTES
Patient refused blood work for placement approval. Patient made aware placement time may take longer if blood work refused. Patient understood and denied blood work again.      Charlotte Mckay RN  10/20/23 8008

## 2023-10-21 NOTE — ED NOTES
Pt asleep at this time. Will obtain vitals when patient wakes.  No signs of distress     Charlotte Mckay RN  10/20/23 2016
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
## 2023-10-21 NOTE — ED NOTES
Patient set up with shower supplies and oral hygiene. Patient pleasant and cooperative at this time.      Harika Rivera RN  10/21/23 4101
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
## 2023-10-21 NOTE — ED NOTES
Call received from [facility] that patient did not want blood draw. They asked an updated clinical be faxed at this time and they could perhaps consider her for placement tomorrow.     Clinical faxed    Saurabh Carvajal  Crisis Intervention Specialist II  10/20/23

## 2023-10-21 NOTE — ED NOTES
Per Alessandra Abbott at [facility]- Patient has been denied due to acuity.     Kenna Ochoa  Crisis Intervention Specialist II  10/20/23
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
## 2023-10-21 NOTE — ED NOTES
Patient given cell phone to write numbers down and then cell phone went back to patients locker.      Deion Pindea RN  10/21/23 3242

## 2023-10-21 NOTE — ED NOTES
[facility] psych consult placed at this time.      Denise Jimenez RN  10/21/23 2079

## 2023-10-21 NOTE — ED NOTES
Per Cameron Rose at [facility]- Will need CBC and CMP before patient can be officially accepted. RN and provider made aware.     Marylene Muta  Crisis Intervention Specialist II  10/20/23

## 2023-10-21 NOTE — ED NOTES
Patient requesting more food because she is hungry. Called placed to kitchen.      José Miguel Christianson RN  10/21/23 3121

## 2023-10-21 NOTE — ED NOTES
Call again placed to [facility] to inform them of patient refusal for blood work/labs. [facility] unable to get through to get through to admissions. [facility] to attempt to follow up at later time.     Vessie Sacks  Crisis Intervention Specialist II  10/20/23

## 2023-10-22 PROCEDURE — 96372 THER/PROPH/DIAG INJ SC/IM: CPT

## 2023-10-22 RX ORDER — LORAZEPAM 2 MG/ML
1 INJECTION INTRAMUSCULAR ONCE
Status: DISCONTINUED | OUTPATIENT
Start: 2023-10-22 | End: 2023-10-23 | Stop reason: HOSPADM

## 2023-10-22 RX ORDER — BENZTROPINE MESYLATE 1 MG/ML
1 INJECTION INTRAMUSCULAR; INTRAVENOUS ONCE
Status: COMPLETED | OUTPATIENT
Start: 2023-10-22 | End: 2023-10-22

## 2023-10-22 RX ORDER — HALOPERIDOL 5 MG/ML
5 INJECTION INTRAMUSCULAR ONCE
Status: DISCONTINUED | OUTPATIENT
Start: 2023-10-22 | End: 2023-10-23 | Stop reason: HOSPADM

## 2023-10-22 RX ORDER — HALOPERIDOL 5 MG/ML
5 INJECTION INTRAMUSCULAR ONCE
Status: COMPLETED | OUTPATIENT
Start: 2023-10-22 | End: 2023-10-22

## 2023-10-22 RX ORDER — LORAZEPAM 2 MG/ML
2 INJECTION INTRAMUSCULAR ONCE
Status: COMPLETED | OUTPATIENT
Start: 2023-10-22 | End: 2023-10-22

## 2023-10-22 RX ADMIN — LORAZEPAM 2 MG: 2 INJECTION INTRAMUSCULAR; INTRAVENOUS at 15:55

## 2023-10-22 RX ADMIN — HALOPERIDOL LACTATE 5 MG: 5 INJECTION, SOLUTION INTRAMUSCULAR at 15:53

## 2023-10-22 RX ADMIN — BENZTROPINE MESYLATE 1 MG: 1 INJECTION INTRAMUSCULAR; INTRAVENOUS at 15:54

## 2023-10-22 NOTE — ED NOTES
Patient requesting personal hygiene products to shower. Personal hygiene products supplied and patient is showering at this time. Patient has since calmed down and is cooperative.       Amarjit Guzman RN  10/22/23 9723

## 2023-10-22 NOTE — ED NOTES
Patient's  called and patient was given portable phone. Patient became increasingly agitated while speaking to  and became verbally aggressive with staff. Patient disconnected call and portable phone returned to this RN.       Isabela Rodriges RN  10/22/23 8739

## 2023-10-22 NOTE — ED NOTES
Patient in room sleeping, patient respirations equal and unlabored, patient appears to be in no distress, patient remains on 1:1 continual observation.       Rylee Robbins RN  10/21/23 1006

## 2023-10-22 NOTE — ED NOTES
Mediations ordered and administered to patient. Patient was agreeable to medication administration. Patient continues to scream profanities in room.       Gio Pierce RN  10/22/23 0582
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
## 2023-10-22 NOTE — ED NOTES
302 bed search  *Patient previously denied at [facility], [address] [place] [address] Rd and [facility]    Intake - no bed availability   [employer] [facility] - reported no female bed availability at this time  [place] - no answer  Marta Euceda - spoke to Yeni Dao - reported no bed availability   [facility] - spoke to Roberto Carlos Wilcox - reported no bed [facility] - spoke to [place]  - reported no bed [address] - spoke to [place] -  reported no appropriate bed available   Sydney Ryan - spoke to [place] - reported bed availability - asked for chart to be faxed  Randall Encarnacion - spoke to [facility] - reported no appropriate bed available   [place] - spoke to Min - no bed availability     [facility] to fax chart to Sydney Ryan

## 2023-10-22 NOTE — ED NOTES
Patient currently laying in bed resting quietly. Patient is in no apparent distress at this time.       Milena Núñez RN  10/22/23 800 [facility] Suzan Koch RN  10/22/23 3005

## 2023-10-22 NOTE — ED PROVIDER NOTES
History  Chief Complaint   Patient presents with    Psychiatric Evaluation     Pt arrives with [employer]. Per [employer] report pt was picked up at [facility] ([facility]), [address], after being called for pt pushing a "vendor" visiting the community on this day. Pt walks into ED- area yelling and then proceeded to ED 21. HPI    29 YOF arrives via [employer] and crisis. [employer] were called to pt's apartment due to pt walking around the hallways yelling and threatening to kill people. They did witness this statement being made. [place] crisis was called and [employer] are willing to petition a 302. Pt arrives yelling profanities and threatening to kill everyone, including myself. Pt stating "I am the devil, you all are demons", "as soon as these people leave yall are going to be put down", "I am going to kill you". Pt very tangential. [employer] reports history of pt being charged with assault charges as well as a history of drug and alcohol use. Prior to Admission Medications   Prescriptions Last Dose Informant Patient Reported? Taking?    Blood Glucose Monitoring Suppl (Laney Pap) w/Device KIT Not Taking  No No   Sig: Use daily to test blood sugar 4 times per day, fasting and 2 hours after the start of each meal.   Patient not taking: Reported on 10/21/2023   [facility] VER test strip Not Taking  No No   Sig: Test blood Sugar 4 times per day, fasting and 2 hours after the start of each meal.   Patient not taking: Reported on 98/82/5016   OneTouch Delica Lancets 68X MISC Not Taking  No No   Sig: Test blood Sugar 4 times per day, fasting and 2 hours after the start of each meal.   Patient not taking: Reported on 10/21/2023   Prenatal Vit-Fe Fumarate-FA (PNV FOLIC ACID + IRON) 63-8 MG TABS Not Taking Self No No   Sig: Take 1 tablet by mouth daily   Patient not taking: Reported on 10/21/2023   albuterol (PROVENTIL HFA,VENTOLIN HFA) 90 mcg/act inhaler Not Taking  No No   Sig: Inhale 2 puffs every 6 (six) hours as needed for wheezing or shortness of breath Patient not taking: Reported on 10/21/2023   albuterol (PROVENTIL HFA,VENTOLIN HFA) 90 mcg/act inhaler Not Taking  No No   Sig: Inhale 2 puffs every 4 (four) hours as needed for wheezing   Patient not taking: Reported on 10/21/2023   dextromethorphan-guaifenesin (MUCINEX DM)  MG per 12 hr tablet Not Taking  No No   Sig: Take 1 tablet by mouth every 12 (twelve) hours   Patient not taking: Reported on 10/21/2023   erythromycin (ILOTYCIN) ophthalmic ointment Not Taking  No No   Sig: Place a 1/2 inch ribbon of ointment into the lower eyelid. Patient not taking: Reported on 10/21/2023   ferrous sulfate 324 (65 Fe) mg Not Taking Self No No   Sig: Take daily at bedtime With orange juice   Patient not taking: Reported on 10/21/2023   ibuprofen (MOTRIN) 600 mg tablet   No No   Sig: Take 1 tablet (600 mg total) by mouth every 6 (six) hours as needed for mild pain for up to 10 days   lurasidone (Latuda) 20 mg tablet Not Taking Self Yes No   Sig: Take 20 mg by mouth daily with breakfast   Patient not taking: Reported on 10/21/2023      Facility-Administered Medications: None       Past Medical History:   Diagnosis Date    Anemia     Anxiety     Asthma     Bipolar 1 disorder ([address])     Depression     Gestational diabetes mellitus (GDM) in third trimester 2020    Obesity     Psychiatric disorder     bipolar    Substance abuse ([address])     UTI (urinary tract infection) 2019    Varicella     Child Hx       Past Surgical History:   Procedure Laterality Date     SECTION      VT  DELIVERY ONLY Bilateral 10/21/2017    Procedure:  SECTION ();   Surgeon: Rey Cook MD;  Location: [facility] ;  Service: Obstetrics    VT LAPS ABD PRTM&OMENTUM DX W/WO SPEC BR/WA SPX N/A 2016    Procedure: EXPLORATORY LAPAROTOMY, LEFT SALPINGECTOMY;  Surgeon: Bradly Mosher MD;  Location: [facility] MAIN OR;  Service: Gynecology    VT [address] [place] [address] N/A 7/10/2019    Procedure: DILATATION AND EVACUATION (D&E) (8 WEEKS); Surgeon: Vinny Lopes MD;  Location: [facility] MAIN [facility];  Service: Gynecology       Family History   Problem Relation Age of Onset    HIV Mother     No Known Problems Father     Breast cancer Maternal Grandmother     Hypertension Maternal Grandmother     Diabetes Paternal Grandmother      I have reviewed and agree with the history as documented. E-Cigarette/Vaping    E-Cigarette Use Never User      E-Cigarette/Vaping Substances    Nicotine No     THC No     CBD No     Flavoring No     Other No     Unknown No      Social History     Tobacco Use    Smoking status: Former     Packs/day: 0.25     Years: 15.00     Total pack years: 3.75     Types: Cigarettes    Smokeless tobacco: Never   Vaping Use    Vaping Use: Never used   Substance Use Topics    Alcohol use: Yes    Drug use: Yes     Types: Marijuana     Comment: medical        Review of Systems   All other systems reviewed and are negative. Physical Exam  Physical Exam  Constitutional:       Appearance: Normal appearance. HENT:      Head: Normocephalic and atraumatic. Right Ear: External ear normal.      Left Ear: External ear normal.      Nose: Nose normal.      Mouth/Throat:      Lips: Pink. Mouth: Mucous membranes are moist.   Eyes:      Extraocular Movements: Extraocular movements intact. Conjunctiva/sclera: Conjunctivae normal.   Pulmonary:      Effort: No tachypnea or respiratory distress. Musculoskeletal:      Cervical back: Normal range of motion and neck supple. Skin:     General: Skin is warm. Capillary Refill: Capillary refill takes less than 2 seconds. Neurological:      Mental Status: She is alert and oriented to person, place, and time. GCS: GCS eye subscore is 4. GCS verbal subscore is 5. GCS motor subscore is 6.    Psychiatric:         Mood and Affect: Mood and affect normal.         Speech: Speech normal.         Vital Signs  ED Triage Vitals   Temperature Pulse Respirations Blood Pressure SpO2   10/20/23 1212 10/20/23 1212 10/20/23 1212 10/20/23 1349 10/20/23 1212   100.2 °F (37.9 °C) (!) 135 20 133/90 100 %      Temp Source Heart Rate Source Patient Position - Orthostatic VS BP Location FiO2 (%)   10/20/23 1212 10/20/23 2200 10/20/23 1212 10/20/23 1212 --   Oral Monitor Sitting Left arm       Pain Score       10/21/23 0800       No Pain           Vitals:    10/21/23 1200 10/21/23 1600 10/21/23 2000 10/22/23 0008   BP: 144/90 137/96 133/76 133/76   Pulse: 80 74 80 80   Patient Position - Orthostatic VS: Sitting Sitting Sitting Sitting         Visual Acuity      ED Medications  Medications   haloperidol lactate (HALDOL) injection 5 mg (0 mg Intramuscular Hold 10/20/23 1452)   LORazepam (ATIVAN) injection 2 mg (0 mg Intramuscular Hold 10/20/23 1453)   haloperidol lactate (HALDOL) injection 5 mg (5 mg Intramuscular Given 10/20/23 1233)   benztropine (COGENTIN) injection 1 mg (1 mg Intramuscular Given by Other 10/20/23 1230)   LORazepam (ATIVAN) injection 2 mg (2 mg Intramuscular Given 10/20/23 1233)   potassium chloride (K-DUR,KLOR-CON) CR tablet 20 mEq (20 mEq Oral Given 10/21/23 1127)       Diagnostic Studies  Results Reviewed       Procedure Component Value Units Date/Time    Comprehensive metabolic panel [[id number]]  (Abnormal) Collected: 10/21/23 0941    Lab Status: Final result Specimen: Blood from Arm, Left Updated: 10/21/23 1013     Sodium 137 mmol/L      Potassium 3.2 mmol/L      Chloride 105 mmol/L      CO2 24 mmol/L      ANION GAP 8 mmol/L      BUN 9 mg/dL      Creatinine 0.90 mg/dL      Glucose 102 mg/dL      Calcium 9.0 mg/dL      AST 21 U/L      ALT 13 U/L      Alkaline Phosphatase 74 U/L      Total Protein 7.2 g/dL      Albumin 4.1 g/dL      Total Bilirubin 1.07 mg/dL      eGFR 84 ml/min/1.73sq m     Narrative:      [place] guidelines for Chronic Kidney Disease (CKD):     Stage 1 with normal or high GFR (GFR > 90 mL/min/1.73 square meters) Stage 2 Mild CKD (GFR = 60-89 mL/min/1.73 square meters)    Stage 3A Moderate CKD (GFR = 45-59 mL/min/1.73 square meters)    Stage 3B Moderate CKD (GFR = 30-44 mL/min/1.73 square meters)    Stage 4 Severe CKD (GFR = 15-29 mL/min/1.73 square meters)    Stage 5 End Stage CKD (GFR <15 mL/min/1.73 square meters)  Note: GFR calculation is accurate only with a steady state creatinine    CBC and differential [[id number]]  (Abnormal) Collected: 10/21/23 0941    Lab Status: Final result Specimen: Blood from Arm, Left Updated: 10/21/23 1002     WBC 6.43 Thousand/uL      RBC 4.27 Million/uL      Hemoglobin 12.2 g/dL      Hematocrit 39.1 %      MCV 92 fL      MCH 28.6 pg      MCHC 31.2 g/dL      RDW 14.3 %      MPV 10.3 fL      Platelets 180 Thousands/uL      nRBC 0 /100 WBCs      Neutrophils Relative 49 %      Immat GRANS % 1 %      Lymphocytes Relative 39 %      Monocytes Relative 8 %      Eosinophils Relative 2 %      Basophils Relative 1 %      Neutrophils Absolute 3.26 Thousands/µL      Immature Grans Absolute 0.03 Thousand/uL      Lymphocytes Absolute 2.48 Thousands/µL      Monocytes Absolute 0.51 Thousand/µL      Eosinophils Absolute 0.10 Thousand/µL      Basophils Absolute 0.05 Thousands/µL     Rapid drug screen, urine [[id number]]  (Abnormal) Collected: 10/20/23 1817    Lab Status: Final result Specimen: Urine, Clean Catch Updated: 10/20/23 1932     Amph/Meth UR Negative     Barbiturate Ur Negative     Benzodiazepine Urine Negative     Cocaine Urine Negative     Methadone Urine --     Opiate Urine Negative     PCP Ur Negative     THC Urine Positive     Oxycodone Urine Negative    Narrative:      Presumptive report. If requested, specimen will be sent to reference lab for confirmation. FOR MEDICAL PURPOSES ONLY. IF CONFIRMATION NEEDED PLEASE CONTACT THE LAB WITHIN 5 DAYS.     Drug Screen Cutoff Levels:  AMPHETAMINE/METHAMPHETAMINES  1000 ng/mL  BARBITURATES     200 ng/mL  BENZODIAZEPINES     200 ng/mL  COCAINE      300 ng/mL  METHADONE      300 ng/mL  OPIATES      300 ng/mL  PHENCYCLIDINE     25 ng/mL  THC       50 ng/mL  OXYCODONE      100 ng/mL    POCT pregnancy, urine [[id number]]  (Normal) Resulted: 10/20/23 1824    Lab Status: Final result Specimen: Urine Updated: 10/20/23 1824     EXT Preg Test, Ur Negative     Control Valid    POCT alcohol breath test [[id number]]  (Normal) Resulted: 10/20/23 1522    Lab Status: Final result Updated: 10/20/23 1523     EXTBreath Alcohol 0.008                   No orders to display              Procedures  Procedures         ED Course  ED Course as of 10/22/23 0703   Sun Oct 22, 2023   0605 Sign out from [facility] ANAI, patient with HI. Awaiting repeat psych consult                                             Medical Decision Making  Amount and/or Complexity of Data Reviewed  Labs: ordered. Risk  Prescription drug management. Disposition  Final diagnoses:   Agitation   Psychosis ([address])   Encounter for psychological evaluation   Homicidal ideation     Time reflects when diagnosis was documented in both MDM as applicable and the Disposition within this note       Time User Action Codes Description Comment    10/20/2023  3:07 PM Crystal Graven [R45.1] Agitation     10/20/2023  3:07 PM Daril Roulette Add [F29] Psychosis ([address])     10/20/2023  3:07 PM Daril Roulette Add [Z00.8] Encounter for psychological evaluation     10/20/2023  3:07 PM Aaron Granger Homicidal ideation     10/20/2023  3:07 PM Skipper Buttery [R45.1] Agitation     10/20/2023  3:07 PM Daril Roulette Modify [R45.850] Homicidal ideation           ED Disposition       None          MD Documentation      [employer] Most Recent Value   Sending MD Dr. Vieyra Levels    None         Patient's Medications   Discharge Prescriptions    No medications on file       No discharge procedures on file.     PDMP Review       None            ED Provider  Electronically Signed by

## 2023-10-22 NOTE — ED NOTES
Patient noted 1:1 sitting outside her door and started to yell "I see you devil". 1:1 POA moved into the nurses station behind the door. The patient continued to yell through the glass that she would hurt the POA.       Michelle Mahajan RN  10/21/23 2044       Michelle Mahajan RN  10/21/23 3382

## 2023-10-22 NOTE — ED NOTES
RN assumes care of the patient at this time. She is sleeping in bed with no obvious signs of distress observed.       Cory Andres RN  10/21/23 2030

## 2023-10-22 NOTE — ED NOTES
Patient sitting in room yelling "I'm going to burn this city down. I'm going to kill all of you motherf*ckers". 1:1 in place for safety. Will continue to monitor.       Jose Mistry RN  10/22/23 812 [facility] Randa Lu RN  10/22/23 9477

## 2023-10-23 ENCOUNTER — DOCUMENTATION (OUTPATIENT)
Dept: PSYCHIATRY | Facility: CLINIC | Age: 33
End: 2023-10-23

## 2023-10-23 VITALS
SYSTOLIC BLOOD PRESSURE: 133 MMHG | TEMPERATURE: 98.2 F | HEART RATE: 89 BPM | OXYGEN SATURATION: 99 % | RESPIRATION RATE: 17 BRPM | DIASTOLIC BLOOD PRESSURE: 88 MMHG

## 2023-10-23 PROCEDURE — 99214 OFFICE O/P EST MOD 30 MIN: CPT | Performed by: PSYCHIATRY & NEUROLOGY

## 2023-10-23 PROCEDURE — NC001 PR NO CHARGE: Performed by: EMERGENCY MEDICINE

## 2023-10-23 RX ORDER — OLANZAPINE 5 MG/1
5 TABLET ORAL
Qty: 30 TABLET | Refills: 0 | Status: SHIPPED | OUTPATIENT
Start: 2023-10-23 | End: 2023-11-22

## 2023-10-23 RX ORDER — OLANZAPINE 5 MG/1
5 TABLET ORAL ONCE
Status: COMPLETED | OUTPATIENT
Start: 2023-10-23 | End: 2023-10-23

## 2023-10-23 RX ADMIN — OLANZAPINE 5 MG: 5 TABLET, FILM COATED ORAL at 09:38

## 2023-10-23 NOTE — ED ATTENDING ATTESTATION
Dr. Jostin tapiaed pt for discharge at this time aware of current /61 HR 56 pt remains asymptomatic.    Patient has been medically screened and is not requiring any further acute medical intervention at this time, and is stable for subsequent transferral a facility with inpatient behavioral health services.       I have been asked by the receiving facility to use this specific phrase: "Patient medically cleared for inpatient psychiatric admission."

## 2023-10-23 NOTE — PROGRESS NOTES
Inaptient psych consult ordered and placed on the [facility] telepsych queue to be seen virtual by an [facility] provider.

## 2023-10-23 NOTE — ED NOTES
0000 assessment and VS deferred pt currently sleeping equal rise and fall of the chest     Adry Carver RN  10/23/23 9917 25-Sep-2022

## 2023-10-23 NOTE — ED NOTES
Assumed care of pt at this time. Pt resting comfortably in room. Pt respirations relaxed and unlabored. Pt is on a 1-1 behavior health safety watch by ED tech michael Mckeon.      Taj Gannon RN  10/23/23 3243

## 2023-10-23 NOTE — ED NOTES
Pt requesting to take shower. Pt given supplies and pt lines changed at this time.      Cassidy Cavanaugh RN  10/23/23 3516

## 2023-10-23 NOTE — ED CARE HANDOFF
Emergency Department Sign Out Note        Sign out and transfer of care from [facility]ee [facility]an [place]. See Separate Emergency Department note. The patient, Geovanna Wahl, was evaluated by the initial provider for agitation, homicidal ideations.      Workup Completed:  Results Reviewed       Procedure Component Value Units Date/Time    Comprehensive metabolic panel [[id number]]  (Abnormal) Collected: 10/21/23 0941    Lab Status: Final result Specimen: Blood from Arm, Left Updated: 10/21/23 1013     Sodium 137 mmol/L      Potassium 3.2 mmol/L      Chloride 105 mmol/L      CO2 24 mmol/L      ANION GAP 8 mmol/L      BUN 9 mg/dL      Creatinine 0.90 mg/dL      Glucose 102 mg/dL      Calcium 9.0 mg/dL      AST 21 U/L      ALT 13 U/L      Alkaline Phosphatase 74 U/L      Total Protein 7.2 g/dL      Albumin 4.1 g/dL      Total Bilirubin 1.07 mg/dL      eGFR 84 ml/min/1.73sq m     Narrative:      [facility] guidelines for Chronic Kid[facility] Disease (CKD):     Stage 1 with normal or high GFR (GFR > 90 mL/min/1.73 square meters)    Stage 2 Mild CKD (GFR = 60-89 mL/min/1.73 square meters)    Stage 3A Moderate CKD (GFR = 45-59 mL/min/1.73 square meters)    Stage 3B Moderate CKD (GFR = 30-44 mL/min/1.73 square meters)    Stage 4 Severe CKD (GFR = 15-29 mL/min/1.73 square meters)    Stage 5 End Stage CKD (GFR <15 mL/min/1.73 square meters)  Note: GFR calculation is accurate only with a steady state creatinine    CBC and differential [[id number]]  (Abnormal) Collected: 10/21/23 0941    Lab Status: Final result Specimen: Blood from Arm, Left Updated: 10/21/23 1002     WBC 6.43 Thousand/uL      RBC 4.27 Million/uL      Hemoglobin 12.2 g/dL      Hematocrit 39.1 %      MCV 92 fL      MCH 28.6 pg      MCHC 31.2 g/dL      RDW 14.3 %      MPV 10.3 fL      Platelets 793 Thousands/uL      nRBC 0 /100 WBCs      Neutrophils Relative 49 %      Immat GRANS % 1 %      Lymphocytes Relative 39 %      Monocytes Relative 8 % Eosinophils Relative 2 %      Basophils Relative 1 %      Neutrophils Absolute 3.26 Thousands/µL      Immature Grans Absolute 0.03 Thousand/uL      Lymphocytes Absolute 2.48 Thousands/µL      Monocytes Absolute 0.51 Thousand/µL      Eosinophils Absolute 0.10 Thousand/µL      Basophils Absolute 0.05 Thousands/µL     Rapid drug screen, urine [[id number]]  (Abnormal) Collected: 10/20/23 1817    Lab Status: Final result Specimen: Urine, Clean Catch Updated: 10/20/23 1932     Amph/Meth UR Negative     Barbiturate Ur Negative     Benzodiazepine Urine Negative     Cocaine Urine Negative     Methadone Urine --     Opiate Urine Negative     PCP Ur Negative     THC Urine Positive     Oxycodone Urine Negative    Narrative:      Presumptive report. If requested, specimen will be sent to reference lab for confirmation. FOR MEDICAL PURPOSES ONLY. IF CONFIRMATION NEEDED PLEASE CONTACT THE LAB WITHIN 5 DAYS. Drug Screen Cutoff Levels:  AMPHETAMINE/METHAMPHETAMINES  1000 ng/mL  BARBITURATES     200 ng/mL  BENZODIAZEPINES     200 ng/mL  COCAINE      300 ng/mL  METHADONE      300 ng/mL  OPIATES      300 ng/mL  PHENCYCLIDINE     25 ng/mL  THC       50 ng/mL  OXYCODONE      100 ng/mL    POCT pregnancy, urine [[id number]]  (Normal) Resulted: 10/20/23 1824    Lab Status: Final result Specimen: Urine Updated: 10/20/23 1824     EXT Preg Test, Ur Negative     Control Valid    POCT alcohol breath test [[id number]]  (Normal) Resulted: 10/20/23 1522    Lab Status: Final result Updated: 10/20/23 1523     EXTBreath Alcohol 0.008              ED Course / Workup Pending (followup):          ED Course as of 10/23/23 0956   Fri Oct 20, 2023   1246 Pt threatening to kill staff, including myself- at this time the decision was made to restrain pt and medicate pt given her risk of self harm and harm to others.     5 302 upheld   1521 Signed out to Raul Trejo PA-C: 36 petitioned and upheld, currently sedated and restrained on 1:1. Cannot leave Mon Oct 23, 2023   0610 S/o from [facility] PA-C: pending repeat psych eval   4337 Dr. Brenda Preciado saw pt as a 2nd psych eval: do not think pt needs to inpatient at this time. they recommend partial program or intensive outpatient therapy and starting zyprexa daily. Procedures  Medical Decision Making  Initially pt seen by me on her day of arrival and 302 was petitioned by [employer] and upheld by Dr. Marlene Munoz here in the ER. Over the weekend there was psych consult by Dr. Richie Mckinney who did not recommend inpatient and pt could be discharged. However staff and providers did not agree with this as pt was still exhibiting HI at other times not during the psych consult. Plan was for repeat psych consult today. Dr. Brenda Preciado again recommended outpatient follow up and starting Zyprexa. Discussed case with Dr. Marlene Munoz. At this time 2 different psychiatrists have recommended discharge, no indication for inpatient. Pt received first dose of zyprexa here, 30 day supply sent to pharmacy. Referrals placed to psych and partial program. Pt cooperative and calm when I discharged her. [id number] [facility] crisis called and discussed the case- 302 can be let to run out. I have discussed the plan to discharge pt from ED. The patient was discharged in stable condition. Patient ambulated off the department. Extensive return to emergency department precautions were discussed. Follow up with appropriate providers including primary care physician was discussed. Patient and/or their  primary decision maker expressed understanding. Patient remained stable during entire emergency department stay. Problems Addressed:  Agitation: acute illness or injury  Bipolar 1 disorder [facility]): chronic illness or injury  Encounter for psychological evaluation: acute illness or injury  Homicidal ideation: acute illness or injury  Psychosis ([address]): acute illness or injury    Amount and/or Complexity of Data Reviewed  Labs: ordered.     Risk  Prescription drug management. Disposition  Final diagnoses:   Agitation   Psychosis ([address])   Encounter for psychological evaluation   Homicidal ideation   Bipolar 1 disorder ([address])     Time reflects when diagnosis was documented in both MDM as applicable and the Disposition within this note       Time User Action Codes Description Comment    10/20/2023  3:07 PM Crystal Graven [R45.1] Agitation     10/20/2023  3:07 PM Daril Roulette Add [F29] Psychosis ([address])     10/20/2023  3:07 PM Daril Roulette Add [Z00.8] Encounter for psychological evaluation     10/20/2023  3:07 PM Aaron Venecia Homicidal ideation     10/20/2023  3:07 PM Skipper Buttery [R45.1] Agitation     10/20/2023  3:07 PM [place], [address] Homicidal ideation     10/23/2023  9:32 AM Daril Roulette Add [F31.9] Bipolar 1 disorder [facility])           ED Disposition       ED Disposition   Discharge    Condition   Stable    Date/Time   Mon Oct 23, 2023  9:32 AM    Comment   Jennifer Knight discharge to home/self care.                    MD Documentation      Reyna Katz Most Recent Value   Sending MD Dr. Pamela Dean up With Specialties Details Why Contact Info Additional Information    [facility] Psychiatry Schedule an appointment as soon as possible for a visit   [address] [facility] [address]  [facility] [place], [place], [address] [phone]          Patient's Medications   Discharge Prescriptions    OLANZAPINE (ZYPREXA) 5 MG TABLET    Take 1 tablet (5 mg total) by mouth daily at bedtime       Start Date: 10/23/2023End Date: 11/22/2023       Order Dose: 5 mg       Quantity: 30 tablet    Refills: 0            ED Provider  Electronically Signed by     Azalea Painting PA-C  10/23/23 ANAI Miranda  10/23/23 7624

## 2023-10-23 NOTE — ED NOTES
Pt woke up pacing around room, ripping off and bed. Pt appears to be searching for something,  and lift up mattress in room. This RN enter room to make up sure pt is safe, and pt became aggressive to staff. "Get the fuck out of my room."  This RN made pt aware of expectations of behavior. "You don't need to come in here every 5 minutes I'm not going to hang myself. You can watch me on the camera I want to speak to the nursing supervisor. Luma Mendes charge nurse speaking with patient. Pt still verbally aggressive at staff. At this time interaction with patient was ended due to patients agitation. Pt currently sitting on stretcher.      Radha Koenig RN  10/23/23 AMANDA Ware  10/23/23 AMANDA Ware  10/23/23 5667
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
## 2023-10-23 NOTE — ED NOTES
Dr Renate Phoenix from [address] [place] [address].  conducting consult on IPAD at this time     Naren Boyle RN  10/23/23 2541

## 2023-10-23 NOTE — ED NOTES
0400 assessment and VS deferred pt currently sleeping equal rise and fall of the chest.      Hillary Franks RN  10/23/23 7458

## 2023-10-24 ENCOUNTER — VBI (OUTPATIENT)
Dept: ADMINISTRATIVE | Facility: OTHER | Age: 33
End: 2023-10-24

## 2023-10-24 NOTE — TELEPHONE ENCOUNTER
10/24/23 11:36 AM    Patient contacted post ED visit, [facility] department spoke with patient/caregiver and outreach was successful. Thank you.   Anabel Luna  [facility]

## 2023-11-08 ENCOUNTER — TELEPHONE (OUTPATIENT)
Dept: PSYCHIATRY | Facility: CLINIC | Age: 33
End: 2023-11-08

## 2023-11-08 NOTE — TELEPHONE ENCOUNTER
Writer called pt regarding referral pt has been added to wait list for [facility] and prefers a female provider.

## 2023-12-21 ENCOUNTER — HOSPITAL ENCOUNTER (EMERGENCY)
Facility: HOSPITAL | Age: 33
End: 2023-12-22
Attending: EMERGENCY MEDICINE
Payer: MEDICARE

## 2023-12-21 DIAGNOSIS — Z00.8 MEDICAL CLEARANCE FOR PSYCHIATRIC ADMISSION: ICD-10-CM

## 2023-12-21 DIAGNOSIS — F29 PSYCHOSIS (HCC): Primary | ICD-10-CM

## 2023-12-21 DIAGNOSIS — E87.6 HYPOKALEMIA: ICD-10-CM

## 2023-12-21 DIAGNOSIS — R45.6 VIOLENT BEHAVIOR: ICD-10-CM

## 2023-12-21 PROBLEM — F23 ACUTE PSYCHOSIS (HCC): Status: ACTIVE | Noted: 2023-12-21

## 2023-12-21 LAB
ALBUMIN SERPL BCP-MCNC: 4.3 G/DL (ref 3.5–5)
ALP SERPL-CCNC: 63 U/L (ref 34–104)
ALT SERPL W P-5'-P-CCNC: 10 U/L (ref 7–52)
ANION GAP SERPL CALCULATED.3IONS-SCNC: 9 MMOL/L
AST SERPL W P-5'-P-CCNC: 19 U/L (ref 13–39)
ATRIAL RATE: 101 BPM
BASOPHILS # BLD AUTO: 0.04 THOUSANDS/ÂΜL (ref 0–0.1)
BASOPHILS NFR BLD AUTO: 0 % (ref 0–1)
BILIRUB SERPL-MCNC: 0.8 MG/DL (ref 0.2–1)
BUN SERPL-MCNC: 12 MG/DL (ref 5–25)
CALCIUM SERPL-MCNC: 9.5 MG/DL (ref 8.4–10.2)
CHLORIDE SERPL-SCNC: 107 MMOL/L (ref 96–108)
CO2 SERPL-SCNC: 21 MMOL/L (ref 21–32)
CREAT SERPL-MCNC: 0.81 MG/DL (ref 0.6–1.3)
EOSINOPHIL # BLD AUTO: 0.11 THOUSAND/ÂΜL (ref 0–0.61)
EOSINOPHIL NFR BLD AUTO: 1 % (ref 0–6)
ERYTHROCYTE [DISTWIDTH] IN BLOOD BY AUTOMATED COUNT: 15.3 % (ref 11.6–15.1)
ETHANOL SERPL-MCNC: <10 MG/DL
GFR SERPL CREATININE-BSD FRML MDRD: 96 ML/MIN/1.73SQ M
GLUCOSE SERPL-MCNC: 156 MG/DL (ref 65–140)
HCT VFR BLD AUTO: 37.2 % (ref 34.8–46.1)
HGB BLD-MCNC: 11.8 G/DL (ref 11.5–15.4)
IMM GRANULOCYTES # BLD AUTO: 0.02 THOUSAND/UL (ref 0–0.2)
IMM GRANULOCYTES NFR BLD AUTO: 0 % (ref 0–2)
LYMPHOCYTES # BLD AUTO: 3.11 THOUSANDS/ÂΜL (ref 0.6–4.47)
LYMPHOCYTES NFR BLD AUTO: 33 % (ref 14–44)
MCH RBC QN AUTO: 29 PG (ref 26.8–34.3)
MCHC RBC AUTO-ENTMCNC: 31.7 G/DL (ref 31.4–37.4)
MCV RBC AUTO: 91 FL (ref 82–98)
MONOCYTES # BLD AUTO: 0.64 THOUSAND/ÂΜL (ref 0.17–1.22)
MONOCYTES NFR BLD AUTO: 7 % (ref 4–12)
NEUTROPHILS # BLD AUTO: 5.5 THOUSANDS/ÂΜL (ref 1.85–7.62)
NEUTS SEG NFR BLD AUTO: 59 % (ref 43–75)
NRBC BLD AUTO-RTO: 0 /100 WBCS
P AXIS: 51 DEGREES
PLATELET # BLD AUTO: 313 THOUSANDS/UL (ref 149–390)
PMV BLD AUTO: 9.8 FL (ref 8.9–12.7)
POTASSIUM SERPL-SCNC: 3.2 MMOL/L (ref 3.5–5.3)
PR INTERVAL: 160 MS
PROT SERPL-MCNC: 7.3 G/DL (ref 6.4–8.4)
QRS AXIS: 57 DEGREES
QRSD INTERVAL: 72 MS
QT INTERVAL: 332 MS
QTC INTERVAL: 430 MS
RBC # BLD AUTO: 4.07 MILLION/UL (ref 3.81–5.12)
SODIUM SERPL-SCNC: 137 MMOL/L (ref 135–147)
T WAVE AXIS: 34 DEGREES
VENTRICULAR RATE: 101 BPM
WBC # BLD AUTO: 9.42 THOUSAND/UL (ref 4.31–10.16)

## 2023-12-21 PROCEDURE — 36415 COLL VENOUS BLD VENIPUNCTURE: CPT | Performed by: EMERGENCY MEDICINE

## 2023-12-21 PROCEDURE — 85025 COMPLETE CBC W/AUTO DIFF WBC: CPT | Performed by: EMERGENCY MEDICINE

## 2023-12-21 PROCEDURE — 80053 COMPREHEN METABOLIC PANEL: CPT | Performed by: EMERGENCY MEDICINE

## 2023-12-21 PROCEDURE — 96372 THER/PROPH/DIAG INJ SC/IM: CPT

## 2023-12-21 PROCEDURE — 82077 ASSAY SPEC XCP UR&BREATH IA: CPT | Performed by: EMERGENCY MEDICINE

## 2023-12-21 PROCEDURE — 99285 EMERGENCY DEPT VISIT HI MDM: CPT | Performed by: EMERGENCY MEDICINE

## 2023-12-21 PROCEDURE — 99285 EMERGENCY DEPT VISIT HI MDM: CPT

## 2023-12-21 PROCEDURE — 93005 ELECTROCARDIOGRAM TRACING: CPT

## 2023-12-21 RX ORDER — LORAZEPAM 1 MG/1
2 TABLET ORAL EVERY 8 HOURS PRN
Status: DISCONTINUED | OUTPATIENT
Start: 2023-12-21 | End: 2023-12-22 | Stop reason: HOSPADM

## 2023-12-21 RX ORDER — OLANZAPINE 10 MG/2ML
10 INJECTION, POWDER, FOR SOLUTION INTRAMUSCULAR EVERY 8 HOURS PRN
Status: DISCONTINUED | OUTPATIENT
Start: 2023-12-21 | End: 2023-12-22 | Stop reason: HOSPADM

## 2023-12-21 RX ORDER — IBUPROFEN 400 MG/1
400 TABLET ORAL EVERY 6 HOURS PRN
Status: DISCONTINUED | OUTPATIENT
Start: 2023-12-21 | End: 2023-12-22

## 2023-12-21 RX ORDER — LORAZEPAM 2 MG/ML
2 INJECTION INTRAMUSCULAR ONCE
Status: COMPLETED | OUTPATIENT
Start: 2023-12-21 | End: 2023-12-21

## 2023-12-21 RX ORDER — LORAZEPAM 2 MG/ML
2 INJECTION INTRAMUSCULAR EVERY 8 HOURS PRN
Status: DISCONTINUED | OUTPATIENT
Start: 2023-12-21 | End: 2023-12-22 | Stop reason: HOSPADM

## 2023-12-21 RX ORDER — ACETAMINOPHEN 325 MG/1
650 TABLET ORAL EVERY 4 HOURS PRN
Status: DISCONTINUED | OUTPATIENT
Start: 2023-12-21 | End: 2023-12-22

## 2023-12-21 RX ORDER — OLANZAPINE 10 MG/1
10 TABLET, ORALLY DISINTEGRATING ORAL ONCE AS NEEDED
Status: DISCONTINUED | OUTPATIENT
Start: 2023-12-21 | End: 2023-12-22 | Stop reason: HOSPADM

## 2023-12-21 RX ORDER — KETAMINE HYDROCHLORIDE 50 MG/ML
4 INJECTION, SOLUTION INTRAMUSCULAR; INTRAVENOUS ONCE
Status: COMPLETED | OUTPATIENT
Start: 2023-12-21 | End: 2023-12-21

## 2023-12-21 RX ORDER — HYDROXYZINE HYDROCHLORIDE 25 MG/1
25 TABLET, FILM COATED ORAL EVERY 6 HOURS PRN
Status: DISCONTINUED | OUTPATIENT
Start: 2023-12-21 | End: 2023-12-22 | Stop reason: HOSPADM

## 2023-12-21 RX ORDER — LANOLIN ALCOHOL/MO/W.PET/CERES
3 CREAM (GRAM) TOPICAL
Status: DISCONTINUED | OUTPATIENT
Start: 2023-12-21 | End: 2023-12-22 | Stop reason: HOSPADM

## 2023-12-21 RX ORDER — OLANZAPINE 10 MG/2ML
10 INJECTION, POWDER, FOR SOLUTION INTRAMUSCULAR ONCE
Status: COMPLETED | OUTPATIENT
Start: 2023-12-21 | End: 2023-12-21

## 2023-12-21 RX ADMIN — KETAMINE HYDROCHLORIDE 380 MG: 50 INJECTION INTRAMUSCULAR; INTRAVENOUS at 18:39

## 2023-12-21 RX ADMIN — LORAZEPAM 2 MG: 2 INJECTION INTRAMUSCULAR; INTRAVENOUS at 18:24

## 2023-12-21 RX ADMIN — OLANZAPINE 10 MG: 10 INJECTION, POWDER, FOR SOLUTION INTRAMUSCULAR at 18:24
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
## 2023-12-22 ENCOUNTER — HOSPITAL ENCOUNTER (INPATIENT)
Facility: HOSPITAL | Age: 33
LOS: 7 days | Discharge: HOME | End: 2023-12-29
Attending: STUDENT IN AN ORGANIZED HEALTH CARE EDUCATION/TRAINING PROGRAM | Admitting: STUDENT IN AN ORGANIZED HEALTH CARE EDUCATION/TRAINING PROGRAM
Payer: COMMERCIAL

## 2023-12-22 VITALS
DIASTOLIC BLOOD PRESSURE: 54 MMHG | HEART RATE: 90 BPM | BODY MASS INDEX: 36.88 KG/M2 | TEMPERATURE: 98.4 F | OXYGEN SATURATION: 100 % | WEIGHT: 209.44 LBS | SYSTOLIC BLOOD PRESSURE: 106 MMHG | RESPIRATION RATE: 16 BRPM

## 2023-12-22 DIAGNOSIS — F31.9 BIPOLAR 1 DISORDER (HCC): Primary | Chronic | ICD-10-CM

## 2023-12-22 DIAGNOSIS — Z00.8 MEDICAL CLEARANCE FOR PSYCHIATRIC ADMISSION: ICD-10-CM

## 2023-12-22 DIAGNOSIS — E87.6 HYPOKALEMIA: ICD-10-CM

## 2023-12-22 LAB — HCG SERPL QL: NEGATIVE

## 2023-12-22 PROCEDURE — 36415 COLL VENOUS BLD VENIPUNCTURE: CPT | Performed by: EMERGENCY MEDICINE

## 2023-12-22 PROCEDURE — 84703 CHORIONIC GONADOTROPIN ASSAY: CPT | Performed by: EMERGENCY MEDICINE

## 2023-12-22 RX ORDER — ACETAMINOPHEN 325 MG/1
975 TABLET ORAL EVERY 6 HOURS PRN
Status: DISCONTINUED | OUTPATIENT
Start: 2023-12-22 | End: 2023-12-29 | Stop reason: HOSPADM

## 2023-12-22 RX ORDER — BENZTROPINE MESYLATE 1 MG/ML
1 INJECTION INTRAMUSCULAR; INTRAVENOUS
Status: CANCELLED | OUTPATIENT
Start: 2023-12-22

## 2023-12-22 RX ORDER — LORAZEPAM 2 MG/ML
2 INJECTION INTRAMUSCULAR
Status: CANCELLED | OUTPATIENT
Start: 2023-12-22

## 2023-12-22 RX ORDER — TRAZODONE HYDROCHLORIDE 50 MG/1
50 TABLET ORAL
Status: CANCELLED | OUTPATIENT
Start: 2023-12-22

## 2023-12-22 RX ORDER — HYDROXYZINE HYDROCHLORIDE 25 MG/1
50 TABLET, FILM COATED ORAL
Status: CANCELLED | OUTPATIENT
Start: 2023-12-22

## 2023-12-22 RX ORDER — LORAZEPAM 1 MG/1
1 TABLET ORAL
Status: DISCONTINUED | OUTPATIENT
Start: 2023-12-22 | End: 2023-12-29 | Stop reason: HOSPADM

## 2023-12-22 RX ORDER — OLANZAPINE 5 MG/1
5 TABLET, ORALLY DISINTEGRATING ORAL 2 TIMES DAILY
Status: DISCONTINUED | OUTPATIENT
Start: 2023-12-22 | End: 2023-12-22 | Stop reason: HOSPADM

## 2023-12-22 RX ORDER — LORAZEPAM 2 MG/ML
1 INJECTION INTRAMUSCULAR EVERY 4 HOURS PRN
Status: CANCELLED | OUTPATIENT
Start: 2023-12-22

## 2023-12-22 RX ORDER — LORAZEPAM 2 MG/ML
1 INJECTION INTRAMUSCULAR EVERY 4 HOURS PRN
Status: DISCONTINUED | OUTPATIENT
Start: 2023-12-22 | End: 2023-12-29 | Stop reason: HOSPADM

## 2023-12-22 RX ORDER — LORAZEPAM 2 MG/ML
2 INJECTION INTRAMUSCULAR
Status: DISCONTINUED | OUTPATIENT
Start: 2023-12-22 | End: 2023-12-29 | Stop reason: HOSPADM

## 2023-12-22 RX ORDER — HALOPERIDOL 10 MG/1
10 TABLET ORAL
Status: DISCONTINUED | OUTPATIENT
Start: 2023-12-22 | End: 2023-12-29 | Stop reason: HOSPADM

## 2023-12-22 RX ORDER — BENZTROPINE MESYLATE 1 MG/ML
1 INJECTION INTRAMUSCULAR; INTRAVENOUS 2 TIMES DAILY PRN
Status: CANCELLED | OUTPATIENT
Start: 2023-12-22

## 2023-12-22 RX ORDER — ACETAMINOPHEN 325 MG/1
650 TABLET ORAL EVERY 6 HOURS PRN
Status: DISCONTINUED | OUTPATIENT
Start: 2023-12-22 | End: 2023-12-29 | Stop reason: HOSPADM

## 2023-12-22 RX ORDER — OLANZAPINE 5 MG/1
5 TABLET, ORALLY DISINTEGRATING ORAL 2 TIMES DAILY
Status: DISCONTINUED | OUTPATIENT
Start: 2023-12-23 | End: 2023-12-24

## 2023-12-22 RX ORDER — HYDROXYZINE HYDROCHLORIDE 25 MG/1
25 TABLET, FILM COATED ORAL
Status: CANCELLED | OUTPATIENT
Start: 2023-12-22

## 2023-12-22 RX ORDER — MINERAL OIL AND PETROLATUM 150; 830 MG/G; MG/G
OINTMENT OPHTHALMIC 4 TIMES DAILY PRN
Status: CANCELLED | OUTPATIENT
Start: 2023-12-22

## 2023-12-22 RX ORDER — ACETAMINOPHEN 325 MG/1
975 TABLET ORAL EVERY 6 HOURS PRN
Status: CANCELLED | OUTPATIENT
Start: 2023-12-22

## 2023-12-22 RX ORDER — HALOPERIDOL 5 MG/1
5 TABLET ORAL
Status: CANCELLED | OUTPATIENT
Start: 2023-12-22

## 2023-12-22 RX ORDER — MAGNESIUM HYDROXIDE/ALUMINUM HYDROXICE/SIMETHICONE 120; 1200; 1200 MG/30ML; MG/30ML; MG/30ML
30 SUSPENSION ORAL EVERY 4 HOURS PRN
Status: CANCELLED | OUTPATIENT
Start: 2023-12-22

## 2023-12-22 RX ORDER — ACETAMINOPHEN 325 MG/1
650 TABLET ORAL EVERY 6 HOURS PRN
Status: CANCELLED | OUTPATIENT
Start: 2023-12-22

## 2023-12-22 RX ORDER — HALOPERIDOL 5 MG/ML
5 INJECTION INTRAMUSCULAR
Status: CANCELLED | OUTPATIENT
Start: 2023-12-22

## 2023-12-22 RX ORDER — HALOPERIDOL 5 MG/ML
5 INJECTION INTRAMUSCULAR
Status: DISCONTINUED | OUTPATIENT
Start: 2023-12-22 | End: 2023-12-29 | Stop reason: HOSPADM

## 2023-12-22 RX ORDER — HALOPERIDOL 5 MG/ML
10 INJECTION INTRAMUSCULAR
Status: DISCONTINUED | OUTPATIENT
Start: 2023-12-22 | End: 2023-12-29 | Stop reason: HOSPADM

## 2023-12-22 RX ORDER — MINERAL OIL AND PETROLATUM 150; 830 MG/G; MG/G
OINTMENT OPHTHALMIC 4 TIMES DAILY PRN
Status: DISCONTINUED | OUTPATIENT
Start: 2023-12-22 | End: 2023-12-29 | Stop reason: HOSPADM

## 2023-12-22 RX ORDER — MAGNESIUM HYDROXIDE/ALUMINUM HYDROXICE/SIMETHICONE 120; 1200; 1200 MG/30ML; MG/30ML; MG/30ML
30 SUSPENSION ORAL EVERY 4 HOURS PRN
Status: DISCONTINUED | OUTPATIENT
Start: 2023-12-22 | End: 2023-12-29 | Stop reason: HOSPADM

## 2023-12-22 RX ORDER — IBUPROFEN 400 MG/1
400 TABLET ORAL EVERY 6 HOURS PRN
Status: DISCONTINUED | OUTPATIENT
Start: 2023-12-22 | End: 2023-12-22 | Stop reason: HOSPADM

## 2023-12-22 RX ORDER — LORAZEPAM 1 MG/1
1 TABLET ORAL
Status: CANCELLED | OUTPATIENT
Start: 2023-12-22

## 2023-12-22 RX ORDER — LORAZEPAM 2 MG/ML
1 INJECTION INTRAMUSCULAR
Status: DISCONTINUED | OUTPATIENT
Start: 2023-12-22 | End: 2023-12-29 | Stop reason: HOSPADM

## 2023-12-22 RX ORDER — LORAZEPAM 2 MG/ML
1 INJECTION INTRAMUSCULAR
Status: CANCELLED | OUTPATIENT
Start: 2023-12-22

## 2023-12-22 RX ORDER — TRAZODONE HYDROCHLORIDE 50 MG/1
50 TABLET ORAL
Status: DISCONTINUED | OUTPATIENT
Start: 2023-12-22 | End: 2023-12-29 | Stop reason: HOSPADM

## 2023-12-22 RX ORDER — BENZTROPINE MESYLATE 1 MG/ML
1 INJECTION INTRAMUSCULAR; INTRAVENOUS 2 TIMES DAILY PRN
Status: DISCONTINUED | OUTPATIENT
Start: 2023-12-22 | End: 2023-12-29 | Stop reason: HOSPADM

## 2023-12-22 RX ORDER — ACETAMINOPHEN 325 MG/1
650 TABLET ORAL EVERY 4 HOURS PRN
Status: DISCONTINUED | OUTPATIENT
Start: 2023-12-22 | End: 2023-12-22 | Stop reason: HOSPADM

## 2023-12-22 RX ORDER — HALOPERIDOL 5 MG/1
5 TABLET ORAL
Status: DISCONTINUED | OUTPATIENT
Start: 2023-12-22 | End: 2023-12-29 | Stop reason: HOSPADM

## 2023-12-22 RX ORDER — ACETAMINOPHEN 325 MG/1
650 TABLET ORAL EVERY 4 HOURS PRN
Status: CANCELLED | OUTPATIENT
Start: 2023-12-22

## 2023-12-22 RX ORDER — BENZTROPINE MESYLATE 1 MG/1
1 TABLET ORAL 2 TIMES DAILY PRN
Status: DISCONTINUED | OUTPATIENT
Start: 2023-12-22 | End: 2023-12-29 | Stop reason: HOSPADM

## 2023-12-22 RX ORDER — HYDROXYZINE HYDROCHLORIDE 25 MG/1
25 TABLET, FILM COATED ORAL
Status: DISCONTINUED | OUTPATIENT
Start: 2023-12-22 | End: 2023-12-29 | Stop reason: HOSPADM

## 2023-12-22 RX ORDER — ACETAMINOPHEN 325 MG/1
650 TABLET ORAL EVERY 4 HOURS PRN
Status: DISCONTINUED | OUTPATIENT
Start: 2023-12-22 | End: 2023-12-29 | Stop reason: HOSPADM

## 2023-12-22 RX ORDER — BISACODYL 10 MG
10 SUPPOSITORY, RECTAL RECTAL DAILY PRN
Status: CANCELLED | OUTPATIENT
Start: 2023-12-22

## 2023-12-22 RX ORDER — HALOPERIDOL 2 MG/1
2 TABLET ORAL
Status: DISCONTINUED | OUTPATIENT
Start: 2023-12-22 | End: 2023-12-29 | Stop reason: HOSPADM

## 2023-12-22 RX ORDER — HALOPERIDOL 1 MG/1
2 TABLET ORAL
Status: CANCELLED | OUTPATIENT
Start: 2023-12-22

## 2023-12-22 RX ORDER — AMOXICILLIN 250 MG
1 CAPSULE ORAL DAILY PRN
Status: CANCELLED | OUTPATIENT
Start: 2023-12-22

## 2023-12-22 RX ORDER — HALOPERIDOL 5 MG/ML
10 INJECTION INTRAMUSCULAR
Status: CANCELLED | OUTPATIENT
Start: 2023-12-22

## 2023-12-22 RX ORDER — BENZTROPINE MESYLATE 1 MG/1
1 TABLET ORAL 2 TIMES DAILY PRN
Status: CANCELLED | OUTPATIENT
Start: 2023-12-22

## 2023-12-22 RX ORDER — HYDROXYZINE 50 MG/1
50 TABLET, FILM COATED ORAL
Status: DISCONTINUED | OUTPATIENT
Start: 2023-12-22 | End: 2023-12-29 | Stop reason: HOSPADM

## 2023-12-22 RX ORDER — OLANZAPINE 5 MG/1
5 TABLET, ORALLY DISINTEGRATING ORAL 2 TIMES DAILY
Status: CANCELLED | OUTPATIENT
Start: 2023-12-23

## 2023-12-22 RX ORDER — BENZTROPINE MESYLATE 1 MG/ML
1 INJECTION INTRAMUSCULAR; INTRAVENOUS
Status: DISCONTINUED | OUTPATIENT
Start: 2023-12-22 | End: 2023-12-29 | Stop reason: HOSPADM

## 2023-12-22 RX ORDER — HALOPERIDOL 5 MG/1
10 TABLET ORAL
Status: CANCELLED | OUTPATIENT
Start: 2023-12-22

## 2023-12-22 RX ORDER — AMOXICILLIN 250 MG
1 CAPSULE ORAL DAILY PRN
Status: DISCONTINUED | OUTPATIENT
Start: 2023-12-22 | End: 2023-12-29 | Stop reason: HOSPADM

## 2023-12-22 RX ORDER — BISACODYL 10 MG
10 SUPPOSITORY, RECTAL RECTAL DAILY PRN
Status: DISCONTINUED | OUTPATIENT
Start: 2023-12-22 | End: 2023-12-29 | Stop reason: HOSPADM

## 2023-12-22 RX ADMIN — BENZTROPINE MESYLATE 1 MG: 1 INJECTION INTRAMUSCULAR; INTRAVENOUS at 22:33

## 2023-12-22 RX ADMIN — LORAZEPAM 2 MG: 2 INJECTION INTRAMUSCULAR; INTRAVENOUS at 22:33

## 2023-12-22 RX ADMIN — HALOPERIDOL LACTATE 10 MG: 5 INJECTION, SOLUTION INTRAMUSCULAR at 22:32

## 2023-12-22 NOTE — ED NOTES
RN assumed care at this time. Patient resting in bed with no signs of distress. Equal non labored respirations.      Mery Campos RN  12/22/23 7867

## 2023-12-22 NOTE — ED NOTES
Patient repositioned in bed, given extra blanket and lights turned down at this time. Patient ate 100% of 2 sandwiches and tyrone crackers.     Valarie Jackman RN  12/22/23 0054

## 2023-12-22 NOTE — ED NOTES
Patient spoke with crisis worker Gracy, patient answered questions calmly and appropriately. Patient asking to be removed from restraints at this time. Patient will be downgraded to alternate restraints. Patient requesting something to eat.     Valarie Jackman RN  12/22/23 0030

## 2023-12-22 NOTE — ED NOTES
Call received from [facility] De[place]- Per [place] most likely a new 302 would need to be filed on Monday 12/25/23 if the patient exhibits behaviors on the unit that would require a 302.    [place] states the 302 could be filed through [facility] at that time, but if they would not accept it as the patient would be in [facility], it could be attempted to be filed through [facility].    Guido Cao  Crisis Intervention Specialist II  12/22/23

## 2023-12-22 NOTE — ED NOTES
RN assumed care of pt at this time. Pt asleep with equal, non labored respirations. RN will continue to monitor. 1-1 to continue for pt and staff safety.      Rachele Thomas RN  12/22/23 0680

## 2023-12-22 NOTE — ED NOTES
Insurance Authorization for Admission:  Phone Call Placed to [facility].  Phone Number [phone].  Spoke to Elina.  6 Days Approved.  Level of Care AIP- 302.  Review on TBD.  Authorization #Accepting facility to call upon admission.    EVS (Eligibility Verification System) Called- [phone]  Automated System Indicates Active with [facility]    Guido Cao  Crisis Intervention Specialist II  12/22/23

## 2023-12-22 NOTE — EMTALA/ACUTE CARE TRANSFER
[facility] EMERGENCY DEPARTMENT  [address] [address]  Dept: [phone]      EMTALA TRANSFER CONSENT    NAME Azalea LOVE 1990                              MRN [id number]    I have been informed of my rights regarding examination, treatment, and transfer   by Dr. Eyal Suggs MD    Benefits: Specialized equipment and/or services available at the receiving facility (Include comment)________________________ (inpatient psych)    Risks:        Consent for Transfer:  I acknowledge that my medical condition has been evaluated and explained to me by the emergency department physician or other qualified medical person and/or my attending physician, who has recommended that I be transferred to the service of  Accepting Physician: Fede at Accepting Facility Name, City & State : [facility]. The above potential benefits of such transfer, the potential risks associated with such transfer, and the probable risks of not being transferred have been explained to me, and I fully understand them.  The doctor has explained that, in my case, the benefits of transfer outweigh the risks.  I agree to be transferred.    I authorize the performance of emergency medical procedures and treatments upon me in both transit and upon arrival at the receiving facility.  Additionally, I authorize the release of any and all medical records to the receiving facility and request they be transported with me, if possible.  I understand that the safest mode of transportation during a medical emergency is an ambulance and that the Hospital advocates the use of this mode of transport. Risks of traveling to the receiving facility by car, including absence of medical control, life sustaining equipment, such as oxygen, and medical personnel has been explained to me and I fully understand them.    (JOHN CORRECT BOX BELOW)  [  ]  I consent to the stated  transfer and to be transported by ambulance/helicopter.  [  ]  I consent to the stated transfer, but refuse transportation by ambulance and accept full responsibility for my transportation by car.  I understand the risks of non-ambulance transfers and I exonerate the Hospital and its staff from any deterioration in my condition that results from this refusal.    X___________________________________________    DATE  23  TIME________  Signature of patient or legally responsible individual signing on patient behalf           RELATIONSHIP TO PATIENT_________________________          Provider Certification    NAME Azalea Ruiz                                         1990                              MRN [id number]    A medical screening exam was performed on the above named patient.  Based on the examination:    Condition Necessitating Transfer The primary encounter diagnosis was Psychosis (HCC). Diagnoses of Violent behavior, Medical clearance for psychiatric admission, and Hypokalemia were also pertinent to this visit.    Patient Condition: The patient has been stabilized such that within reasonable medical probability, no material deterioration of the patient condition or the condition of the unborn child(wilfredo) is likely to result from the transfer    Reason for Transfer: Level of Care needed not available at this facility    Transfer Requirements: Facility [facility]   Space available and qualified personnel available for treatment as acknowledged by    Dania to accept transfer and to provide appropriate medical treatment as acknowledged by       Fede  Appropriate medical records of the examination and treatment of the patient are provided at the time of transfer   STAFF INITIAL WHEN COMPLETED _______  Transfer will be performed by qualified personnel from    and appropriate transfer equipment as required, including the use of necessary and appropriate life support measures.    Provider  Certification: I have examined the patient and explained the following risks and benefits of being transferred/refusing transfer to the patient/family:  General risk, such as traffic hazards, adverse weather conditions, rough terrain or turbulence, possible failure of equipment (including vehicle or aircraft), or consequences of actions of persons outside the control of the transport personnel      Based on these reasonable risks and benefits to the patient and/or the unborn child(wilfredo), and based upon the information available at the time of the patient’s examination, I certify that the medical benefits reasonably to be expected from the provision of appropriate medical treatments at another medical facility outweigh the increasing risks, if any, to the individual’s medical condition, and in the case of labor to the unborn child, from effecting the transfer.    X____________________________________________ DATE 12/22/23        TIME_______      ORIGINAL - SEND TO MEDICAL RECORDS   COPY - SEND WITH PATIENT DURING TRANSFER

## 2023-12-22 NOTE — ED NOTES
Patient is accepted at [facility].  Patient is accepted by ANAI Benitez in Intake.    Transportation is arranged with BLS.  Transportation is scheduled for TBD.  Patient may go to the floor at 1930.      Nurse report is to be called to [phone] prior to patient transfer.     Guido Cao  Crisis Intervention Specialist II  12/22/23

## 2023-12-22 NOTE — ED NOTES
Overnight CIS in the department at this time. Chart notes appear to indicate there has been no crisis assessment of this patient this admission, presumably due to the combative behaviors she exhibited upon arrival and the subsequent IM medications and restraints required to keep her safe while she calmed down. She has reportedly been sleeping since that time. CIS went to try to speak with the patient to begin the assessment, but patient was sleeping fairly soundly. Was able to awaken her, but she spoke very little and kept falling back to sleep quickly. Was unable to obtain any information at this time. Will attempt again later when she becomes more arousable and able to converse.
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
## 2023-12-22 NOTE — ED NOTES
"Patient was brought to the ED by the local police department who also petitioned for an involuntary psychiatric commitment due to bizarre and aggressive behavior toward a stranger in the doll of her apartment building, as well as verbal aggression and threats to police. Upon arrival in the ED, she continued to display aggressive behaviors and threats to kill everyone. She required medications and physical restraints to ensure her safety and that of others in the area. Police reported that she is paranoid, has delusions about strangers and people around her (including the police and the ED staff) being demons and being racist. After being medicated, she slept for around 6 hours. Upon waking she was much more physically calm, and was willing to speak with [facility], but she continued to express the belief that there were demons all around, that the stranger had made \"racist movements and gestures and everything about her was racist\", and was fixated on being prosecuted for her Confucianism. She stated she was not allowed to practice her Confucianism anywhere due to the demons preventing her from singing or speaking. She denied SI, HI and psychosis and showed no insight at all into her mental health issues. She stated she did not have mental health issues, and did not need professional services, hospitalization or medications. She did, however, understand that she has been involuntarily committed, and did not make threats to act out or attempt to argue about that when [facility] discussed it with her. She said she has previously been served an eviction notice and is supposed to leave her apartment by 12/28/23. She said she has no other place to live, and plans to go live with her aunt in [place]. She did not answer when asked if her aunt knew of and approved this.   "

## 2023-12-22 NOTE — ED NOTES
Assumed care for patient at this time, patient restless, appears to be fighting the restraints and then falling back to sleep. Patient given apple juice by ED tech Mickie, patient instructed to take small sips do to drowsiness and risk for aspiration but patient bit down on straw and would not let go because she wanted to drink more.      Valarie Jackman RN  12/21/23 0380

## 2023-12-22 NOTE — ED NOTES
Pt changed into paper scrubs at this time. Pt's linens changed. RN requested urine sample from pt and pt refused. RN educated pt on need of sample for placement. Pt continued to refuse.     Rachele Thomas RN  12/22/23 0472

## 2023-12-22 NOTE — ED NOTES
Patient reassessed at this time as patient appears more awake, patient alert and oriented x3, patient reports she is feeling better and wont fight us anymore, patient acknowledges that she is in the hospital. Patient drank apple juice and is agreeable to speak to crisis.      Valarie Jackman RN  12/22/23 0015

## 2023-12-22 NOTE — ED NOTES
Pt given saop/washcloth to wash up. Given fresh underwear, socks and paper scrubs.      Mery Campos RN  12/22/23 4799

## 2023-12-22 NOTE — ED NOTES
Faxed 302 to [facility] to ensure they received it. Completed [employer] forms and placed on paper chart. Faxed referral to Intake earlier this shift.

## 2023-12-22 NOTE — RESTRAINT FACE TO FACE
Restraint Face to Face   Azalea Ruiz 33 y.o. female MRN: [id number]  Unit/Bed#: ED-27 Encounter: [id number]      Physical Evaluation - currently sleeping/sedated, NAD. MMM, LCTAB, HRRR, Abd soft NTND, Neuro - MAEW  Purpose for Restraints/ Seclusion High risk for causing significant disruption of treatment environment , High risk for harm to others, high risk for flight, and unable to follow directions  Patient's reaction to the intervention - calm, sedated  Patient's medical condition stable  Patient's Behavioral condition improved  Restraints to be Continued

## 2023-12-22 NOTE — ED NOTES
Pt asleep in bed at this time. Pt with equal, non labored respirations. Vital signs deferred. RN will continue to monitor. 1-1 to continue for pt and staff safety.      Rachele Thomas RN  12/22/23 3652

## 2023-12-22 NOTE — ED NOTES
Patient given sandwich, crackers and apple juice at this time.     Valarie Jackman RN  12/22/23 0041

## 2023-12-22 NOTE — ED NOTES
Call placed to [facility] ([phone])- Requested a delegate call this [facility] to discuss 302/303 issues.    Guido Cao  Crisis Intervention Specialist II  12/22/23

## 2023-12-23 PROBLEM — Z00.8 MEDICAL CLEARANCE FOR PSYCHIATRIC ADMISSION: Status: ACTIVE | Noted: 2020-07-07

## 2023-12-23 PROCEDURE — 99223 1ST HOSP IP/OBS HIGH 75: CPT | Performed by: PSYCHIATRY & NEUROLOGY

## 2023-12-23 PROCEDURE — 99253 IP/OBS CNSLTJ NEW/EST LOW 45: CPT | Performed by: PHYSICIAN ASSISTANT

## 2023-12-23 RX ADMIN — OLANZAPINE 5 MG: 5 TABLET, ORALLY DISINTEGRATING ORAL at 08:23

## 2023-12-23 RX ADMIN — OLANZAPINE 5 MG: 5 TABLET, ORALLY DISINTEGRATING ORAL at 21:04

## 2023-12-23 NOTE — ASSESSMENT & PLAN NOTE
Admission labs reviewed, CBC, CMP acceptable  Mild hypokalemia noted - replete  CMP, CBC, lipid panel, TSH, hemoglobin A1c, RPR, MND 25-hydroxy, UDS labs pending  Vitals stable   UDS not performed  COVID-19 negative  EKG reveals sinus tachycardia, 101 bpm   Medically stable for continued inpatient psychiatric treatment based on available results  Please contact [facility] with any questions or concerns
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
## 2023-12-23 NOTE — NURSING NOTE
PRN medications were effective. Pt appears calmer. She is currently attempting to rest in her room. Pt given snacks and fluids.

## 2023-12-23 NOTE — PLAN OF CARE
Problem: MERLINE  Goal: Will exhibit normal sleep and speech and no impulsivity  Description: INTERVENTIONS:  - Administer medication as ordered  - Set limits on impulsive behavior  - Make attempts to decrease external stimuli as possible  Outcome: Not Progressing     Problem: PSYCHOSIS  Goal: Will report no hallucinations or delusions  Description: Interventions:  - Administer medication as  ordered  - Every waking shifts and PRN assess for the presence of hallucinations and or delusions  - Assist with reality testing to support increasing orientation  - Assess if patient's hallucinations or delusions are encouraging self-harm or harm to others and intervene as appropriate  Outcome: Not Progressing     Problem: BEHAVIOR  Goal: Pt/Family maintain appropriate behavior and adhere to behavioral management agreement, if implemented  Description: INTERVENTIONS:  - Assess the family dynamic   - Encourage verbalization of thoughts and concerns in a socially appropriate manner  - Assess patient/family's coping skills and non-compliant behavior (including use of illegal substances).  - Utilize positive, consistent limit setting strategies supporting safety of patient, staff and others  - Initiate consult with Case Management, Spiritual Care or other ancillary services as appropriate  - If a patient's/visitor's behavior jeopardizes the safety of the patient, staff, or others, refer to organization procedure.   - Notify Security of behavior or suspected illegal substances which indicate the need for search of the patient and/or belongings  - Encourage participation in the decision making process about a behavioral management agreement; implement if patient meets criteria  Outcome: Not Progressing     Problem: ANXIETY  Goal: Will report anxiety at manageable levels  Description: INTERVENTIONS:  - Administer medication as ordered  - Teach and encourage coping skills  - Provide emotional support  - Assess patient/family for  anxiety and ability to cope  Outcome: Not Progressing  Goal: By discharge: Patient will verbalize 2 strategies to deal with anxiety  Description: Interventions:  - Identify any obvious source/trigger to anxiety  - Staff will assist patient in applying identified coping technique/skills  - Encourage attendance of scheduled groups and activities  Outcome: Not Progressing     Problem: SLEEP DISTURBANCE  Goal: Will exhibit normal sleeping pattern  Description: Interventions:  -  Assess the patients sleep pattern, noting recent changes  - Administer medication as ordered  - Decrease environmental stimuli, including noise, as appropriate during the night  - Encourage the patient to actively participate in unit groups and or exercise during the day to enhance ability to achieve adequate sleep at night  - Assess the patient, in the morning, encouraging a description of sleep experience  Outcome: Not Progressing     Problem: SELF CARE DEFICIT  Goal: Return ADL status to a safe level of function  Description: INTERVENTIONS:  - Administer medication as ordered  - Assess ADL deficits and provide assistive devices as needed  - Obtain PT/OT consults as needed  - Assist and instruct patient to increase activity and self care as tolerated  Outcome: Not Progressing     Problem: INVOLUNTARY ADMIT  Goal: Will cooperate with staff recommendations and doctor's orders and will demonstrate appropriate behavior  Description: INTERVENTIONS:  - Treat underlying conditions and offer medication as ordered  - Educate regarding involuntary admission procedures and rules  - Utilize positive consistent limit setting strategies to support patient and staff safety  Outcome: Not Progressing     Problem: Alteration in Thoughts and Perception  Goal: Treatment Goal: Gain control of psychotic behaviors/thinking, reduce/eliminate presenting symptoms and demonstrate improved reality functioning upon discharge  Outcome: Not Progressing  Goal: Verbalize  thoughts and feelings  Description: Interventions:  - Promote a nonjudgmental and trusting relationship with the patient through active listening and therapeutic communication  - Assess patient's level of functioning, behavior and potential for risk  - Engage patient in 1 on 1 interactions  - Encourage patient to express fears, feelings, frustrations, and discuss symptoms    - [place] patient to reality, help patient recognize reality-based thinking   - Administer medications as ordered and assess for potential side effects  - Provide the patient education related to the signs and symptoms of the illness and desired effects of prescribed medications  Outcome: Not Progressing  Goal: Refrain from acting on delusional thinking/internal stimuli  Description: Interventions:  - Monitor patient closely, per order   - Utilize least restrictive measures   - Set reasonable limits, give positive feedback for acceptable   - Administer medications as ordered and monitor of potential side effects  Outcome: Not Progressing
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
## 2023-12-23 NOTE — NURSING NOTE
"When patient arrived to the unit she was verbally aggressive towards staff. Pt accusing staff of trying to harm her. She stated \"I already had to fight people before coming here, I will do it again.\" She appears paranoid, agitated, manic. Was going down the halls yelling at peers stating \"try me.\" Pt stated the reason why she is here is because she didn't sell her soul to the gestigon. She remained disorganized, was singing very loudly down the halls disturbing peers. Pt was offered PO PRN medications to assist agitation. She refused. Pt also refused her HS scheduled Depakote. She continued to make threats to BHT, would not follow redirection. Pt reports not sleeping in days because the devil is after her. Pt was given IM haldol 10 mg, cogentin 1 mg, and 2 mg of ativan and was tolerated. Pt agreed to remain in her room at this time. Medication given at 2232.   "
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
## 2023-12-23 NOTE — NURSING NOTE
RN will meet with patient at least twice per day to access for any concerns. Teach about prescribed medications and diagnosis. Pt will be taught and encouraged to utilize healthy coping skills.

## 2023-12-23 NOTE — TREATMENT PLAN
TREATMENT PLAN REVIEW - Behavioral Health Azalea Ruiz 33 y.o. 1990 female MRN: [id number]    [facility] IP BEHAVIORAL [facility] Room / Bed: [facility] 202/[facility] 202-01 Encounter: [id number]          Admit Date/Time:  12/22/2023  9:38 PM    Treatment Team:   MD Shameka Henning, AMINA Morton, AMANDA Johnson, AMANDA Booth    Diagnosis: Principal Problem:    Bipolar 1 disorder (HCC)  Active Problems:    Medical clearance for psychiatric admission      Patient Strengths/Assets: good physical health, negotiates basic needs, Sabianist affiliation    Patient Barriers/Limitations: difficulty adapting, limited education, noncompliant with treatment, patient is on an involuntary commitment, poor insight, poor support system    Short Term Goals: decrease in paranoid thoughts, decrease in psychotic symptoms, decrease in homicidal thoughts, ability to stay safe on the unit, ability to stay free of restraints, improvement in insight, mood stabilization, increase in group attendance, increase in socialization with peers on the unit, acceptance of need for psychiatric treatment    Long Term Goals: stabilization of mood, free of suicidal thoughts, free of homicidal thoughts, resolution of psychotic symptoms, improvement in reality testing, improved insight, acceptance of need for psychiatric treatment, acceptance of psychiatric diagnosis, adequate self care    Progress Towards Goals: starting psychiatric medications as prescribed, continue psychiatric medications as prescribed    Recommended Treatment: medication management, patient medication education, group therapy, milieu therapy, continued Behavioral Health psychiatric evaluation/assessment process    Treatment Frequency: daily medication monitoring, group and milieu therapy daily, monitoring through interdisciplinary  rounds, monitoring through weekly patient care conferences    Expected Discharge Date:  TBD    Discharge Plan: referral for outpatient medication management with a psychiatrist, referrals as indicated    Treatment Plan Created/Updated By: Nemo Garza MD

## 2023-12-23 NOTE — ED NOTES
"Rn took over care at this time. Patient is sitting in bed making statements that staff are \"demons\". Patient is not in distress or violent at this time. Patient is following instructions and staying in her room. Patient is on continual observation with ED tech Jessica sitting outside patients room. RN will continue monitoring.      Kati Rayo RN  12/22/23 2007    "

## 2023-12-23 NOTE — NURSING NOTE
Pt calm and cooperative during assessment. Complaint with morning medication and being seen by doctor. Drowsy and napping most of shift. Scant in conversation. Denies SI/HI/AVH. Will continue to monitor.

## 2023-12-24 LAB
FLUAV RNA RESP QL NAA+PROBE: POSITIVE
FLUBV RNA RESP QL NAA+PROBE: NEGATIVE
RSV RNA RESP QL NAA+PROBE: NEGATIVE
SARS-COV-2 RNA RESP QL NAA+PROBE: NEGATIVE

## 2023-12-24 PROCEDURE — 0241U HB NFCT DS VIR RESP RNA 4 TRGT: CPT | Performed by: PSYCHIATRY & NEUROLOGY

## 2023-12-24 PROCEDURE — 99232 SBSQ HOSP IP/OBS MODERATE 35: CPT | Performed by: PSYCHIATRY & NEUROLOGY

## 2023-12-24 RX ORDER — OLANZAPINE 10 MG/1
10 TABLET, ORALLY DISINTEGRATING ORAL
Status: DISCONTINUED | OUTPATIENT
Start: 2023-12-24 | End: 2023-12-29 | Stop reason: HOSPADM

## 2023-12-24 RX ORDER — OLANZAPINE 5 MG/1
5 TABLET, ORALLY DISINTEGRATING ORAL DAILY
Status: DISCONTINUED | OUTPATIENT
Start: 2023-12-25 | End: 2023-12-29 | Stop reason: HOSPADM

## 2023-12-24 RX ORDER — OLANZAPINE 5 MG/1
5 TABLET ORAL DAILY
Status: DISCONTINUED | OUTPATIENT
Start: 2023-12-25 | End: 2023-12-24

## 2023-12-24 RX ADMIN — ACETAMINOPHEN 975 MG: 325 TABLET, FILM COATED ORAL at 21:38

## 2023-12-24 RX ADMIN — OLANZAPINE 5 MG: 5 TABLET, ORALLY DISINTEGRATING ORAL at 09:27

## 2023-12-24 RX ADMIN — ACETAMINOPHEN 975 MG: 325 TABLET, FILM COATED ORAL at 15:37

## 2023-12-24 RX ADMIN — OLANZAPINE 10 MG: 10 TABLET, ORALLY DISINTEGRATING ORAL at 21:29

## 2023-12-24 NOTE — NURSING NOTE
Pt adherent with scheduled medication. Pt calm and cooperative this morning. Denies anxiety or depression. Denies SI/HI/AVH. Pt showered this morning. Denies any needs at this time. Intermittently visible in milieu.
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
## 2023-12-24 NOTE — PLAN OF CARE
Problem: MERLINE  Goal: Will exhibit normal sleep and speech and no impulsivity  Description: INTERVENTIONS:  - Administer medication as ordered  - Set limits on impulsive behavior  - Make attempts to decrease external stimuli as possible  Outcome: Progressing     Problem: PSYCHOSIS  Goal: Will report no hallucinations or delusions  Description: Interventions:  - Administer medication as  ordered  - Every waking shifts and PRN assess for the presence of hallucinations and or delusions  - Assist with reality testing to support increasing orientation  - Assess if patient's hallucinations or delusions are encouraging self-harm or harm to others and intervene as appropriate  Outcome: Progressing     Problem: ANXIETY  Goal: Will report anxiety at manageable levels  Description: INTERVENTIONS:  - Administer medication as ordered  - Teach and encourage coping skills  - Provide emotional support  - Assess patient/family for anxiety and ability to cope  Outcome: Progressing     Problem: INVOLUNTARY ADMIT  Goal: Will cooperate with staff recommendations and doctor's orders and will demonstrate appropriate behavior  Description: INTERVENTIONS:  - Treat underlying conditions and offer medication as ordered  - Educate regarding involuntary admission procedures and rules  - Utilize positive consistent limit setting strategies to support patient and staff safety  Outcome: Progressing
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
## 2023-12-24 NOTE — PROGRESS NOTES
12/24/23 0820   Team Meeting   Meeting Type Tx Team Meeting   Initial Conference Date 12/24/23   Next Conference Date 01/21/24   Team Members Present   Team Members Present Physician;Nurse;   Physician Team Member Dr. Garza   Nursing Team Member Chacorta   Care Management Team Member Vicky   Patient/Family Present   Patient Present No  (Pt declined to meet with treatment team.)   Patient's Family Present No       Reviewed diagnosis of Bipolar 1 disorder.  Discussed short term goals of decrease in paranoid thoughts, decrease in psychotic symptoms, decrease in homicidal thoughts, ability to stay safe on the unit, ability to stay free of restraints, improvement in insight, mood stabilization, increase in group attendance, increase in socialization with peers on the unit, acceptance of need for psychiatric treatment.  No discharge date set at this time.  All parties are in agreement and treatment plan was signed.

## 2023-12-24 NOTE — NURSING NOTE
Pt OOB briefly to use phone. During shift change vitals pt had elevated temp 102.5 tympanic, rechecked 102.1 F orally. Pt reports a cough and HA. Received PRN Tylenol 975mg. COVID/Flu/RSV swab collected. Mask provided at bedside, discussed good hand hygiene, eating meals in room at this time.     Pt appears preoccupied at time throughout the afternoon, Denies SI, HI. Pt with c/o general malaise and wishes to rest at this time.

## 2023-12-24 NOTE — NURSING NOTE
"Per T Pt with bizarre answers during morning group. When asked for another name for Briseida Pt reported \"satan.\" Pt remains calm and cooperative.   "

## 2023-12-24 NOTE — PLAN OF CARE
Problem: BEHAVIOR  Goal: Pt/Family maintain appropriate behavior and adhere to behavioral management agreement, if implemented  Description: INTERVENTIONS:  - Assess the family dynamic   - Encourage verbalization of thoughts and concerns in a socially appropriate manner  - Assess patient/family's coping skills and non-compliant behavior (including use of illegal substances).  - Utilize positive, consistent limit setting strategies supporting safety of patient, staff and others  - Initiate consult with Case Management, Spiritual Care or other ancillary services as appropriate  - If a patient's/visitor's behavior jeopardizes the safety of the patient, staff, or others, refer to organization procedure.   - Notify Security of behavior or suspected illegal substances which indicate the need for search of the patient and/or belongings  - Encourage participation in the decision making process about a behavioral management agreement; implement if patient meets criteria  Outcome: Progressing     Problem: ANXIETY  Goal: Will report anxiety at manageable levels  Description: INTERVENTIONS:  - Administer medication as ordered  - Teach and encourage coping skills  - Provide emotional support  - Assess patient/family for anxiety and ability to cope  Outcome: Progressing

## 2023-12-24 NOTE — PROGRESS NOTES
Progress Note - Behavioral Health   Azalea Ruiz 33 y.o. female MRN: [id number]  Unit/Bed#: [facility] 202-01 Encounter: [id number]    Assessment/Plan   Principal Problem:    Bipolar 1 disorder (HCC)  Active Problems:    Medical clearance for psychiatric admission      Discontinue Depakote as patient states that she will not take it   Continue morning Zyprexa 5 mg   Increase evening Zyprexa from 5 mg to 10 mg for mood stabilization and psychosis   continue medications as noted below.  Continue to promote patient participation in group therapy, milieu therapy, occupational therapy  Continue medical management by primary team.  Discharge disposition: Pending.  Patient is under 302 commitment status.    Subjective:  The patient was evaluated this morning for continuity of care and no acute distress noted throughout the evaluation. Over the past 24 hours staff noted that Azalea has experienced periods of irritability when interacting with staff, but has been mostly withdrawn to her room.  Appears to still be exhibiting symptoms of paranoia.  Patient has been medication adherent with Zyprexa but not with Depakote.    Today on evaluation, Azalea states that she feels slightly better in terms of her mood, and states that Zyprexa has been helping her feel more organized and calmer.  She states that she will not take Depakote, but to adjust Zyprexa instead and agrees to continue taking Zyprexa.  She says that she still feels suspicious on the unit, but denies any major issues.  Denies auditory hallucinations, visual hallucinations, though appears to be internally preoccupied and religiously focused.  She denies suicidal ideation.  She denies homicidal ideation at this time.    Psychiatric Review of Systems:  Behavior over the last 24 hours: Slight improvement  Sleep: improved  Appetite: normal  Medication side effects: No   ROS: no complaints, all other systems are negative    Current Medications:  Current Facility-Administered  Medications   Medication Dose Route Frequency Provider Last Rate    acetaminophen  650 mg Oral Q6H PRN [facility], PA-C      acetaminophen  650 mg Oral Q4H PRN [facility], PA-C      acetaminophen  975 mg Oral Q6H PRN [facility], PA-C      aluminum-magnesium hydroxide-simethicone  30 mL Oral Q4H PRN [facility], PA-C      artificial tear   Both Eyes 4x Daily PRN [facility], PA-C      benztropine  1 mg Intramuscular BID PRN [facility], PA-C      haloperidol lactate  5 mg Intramuscular Q6H PRN Max 4/day [facility], PA-C      And    LORazepam  1 mg Intramuscular Q6H PRN Max 4/day [facility], PA-C      And    benztropine  1 mg Intramuscular Q6H PRN Max 4/day [facility], PA-C      haloperidol lactate  10 mg Intramuscular Q4H PRN Max 4/day [facility], PA-C      And    LORazepam  2 mg Intramuscular Q4H PRN Max 4/day [facility], PA-C      And    benztropine  1 mg Intramuscular Q4H PRN Max 4/day [facility], PA-C      benztropine  1 mg Oral BID PRN [facility], PA-C      bisacodyl  10 mg Rectal Daily PRN [facility], PA-C      haloperidol  10 mg Oral Q4H PRN Max 4/day [facility], PA-C      haloperidol  2 mg Oral Q4H PRN Max 6/day [facility], PA-C      haloperidol  5 mg Oral Q6H PRN Max 4/day [facility], PA-C      hydrOXYzine HCL  25 mg Oral Q6H PRN Max 4/day [facility], PA-C      hydrOXYzine HCL  50 mg Oral Q4H PRN Max 4/day [facility], PA-C      Or    LORazepam  1 mg Intramuscular Q4H PRN [facility], PA-C      LORazepam  1 mg Oral Q4H PRN Max 6/day [facility], PA-C      Or    LORazepam  2 mg Intramuscular Q6H PRN Max 3/day [facility], PA-C      magnesium hydroxide  30 mL Oral Daily PRN [facility], PA-C      OLANZapine  10 mg Oral HS Nemo Garza MD      [START ON 12/25/2023] OLANZapine  5 mg Oral Daily Nemo Garza MD      senna-docusate sodium  1 tablet Oral Daily PRN Stephon Mistry PA-C      traZODone  50 mg  "Oral HS PRN Stephon Mistry PA-C         Behavioral Health Medications: all current active meds have been reviewed and continue current psychiatric medications.    Vital signs in last 24 hours:  Temp:  [100.3 °F (37.9 °C)-101.6 °F (38.7 °C)] 100.3 °F (37.9 °C)  HR:  [96-98] 96  Resp:  [17-20] 17  BP: (108-130)/(69-94) 130/94    Laboratory results:  I have personally reviewed all pertinent laboratory/tests results.  Most Recent Labs:   Lab Results   Component Value Date    WBC 9.42 12/21/2023    RBC 4.07 12/21/2023    HGB 11.8 12/21/2023    HCT 37.2 12/21/2023     12/21/2023    RDW 15.3 (H) 12/21/2023    NEUTROABS 5.50 12/21/2023    SODIUM 137 12/21/2023    K 3.2 (L) 12/21/2023     12/21/2023    CO2 21 12/21/2023    BUN 12 12/21/2023    CREATININE 0.81 12/21/2023    GLUC 156 (H) 12/21/2023    GLUF 93 09/19/2022    CALCIUM 9.5 12/21/2023    AST 19 12/21/2023    ALT 10 12/21/2023    ALKPHOS 63 12/21/2023    TP 7.3 12/21/2023    ALB 4.3 12/21/2023    TBILI 0.80 12/21/2023    CHOLESTEROL 218 (H) 09/19/2022    HDL 66 09/19/2022    TRIG 78 09/19/2022    LDLCALC 136 (H) 09/19/2022    NONHDLC 152 09/19/2022    PREGUR Negative 07/18/2022    PREGSERUM Negative 12/22/2023    HCGQUANT 19,875 (H) 04/08/2020    RPR Non Reactive 06/19/2020         Mental Status Evaluation:    Appearance:  age appropriate, dressed in hospital attire, -American female   Behavior:  cooperative, guarded   Speech:  Less pressured, normal rate and volume   Mood:  \"Great \"   Affect:  constricted, irritable   Thought Process:  circumstantial   Associations: concrete associations   Thought Content:  Paranoia, Adventist preoccupation   Perceptual Disturbances: denies auditory or visual hallucinations when asked, but appears preoccupied   Risk Potential: Suicidal ideation - None at present  Homicidal ideation - None at present  Potential for aggression - Yes, due to acute psychosis   Sensorium:  oriented to person, place, and time/date "   Memory:  recent and remote memory grossly intact   Consciousness:  alert and awake   Attention/Concentration: attention span and concentration appear shorter than expected for age   Insight:  limited   Judgment: limited   Gait/Station: normal gait/station   Motor Activity: no abnormal movements     Progress Toward Goals: Progressing    Recommended Treatment:   See above for assessment and plan.     Risks, benefits and possible side effects of Medications:   Risks, benefits, and possible side effects of medications explained to patient and patient verbalizes understanding.      Treatment discussed with nursing staff.    This note has been constructed using a voice recognition system.  There may be translation, syntax, or grammatical errors. If you have any questions, please contact the dictating provider.    Nemo Garza MD

## 2023-12-24 NOTE — PLAN OF CARE
Problem: DISCHARGE PLANNING  Goal: Discharge to home or other facility with appropriate resources  Description: INTERVENTIONS:  - Identify barriers to discharge w/patient and caregiver  - Arrange for needed discharge resources and transportation as appropriate  - Identify discharge learning needs (meds, wound care, etc.)  - Arrange for interpretive services to assist at discharge as needed  - Refer to Case Management Department for coordinating discharge planning if the patient needs post-hospital services based on physician/advanced practitioner order or complex needs related to functional status, cognitive ability, or social support system  Outcome: Progressing  Note: Pt met with CM to review and sign ROIs and complete intake. Pt read and signed treatment plan.

## 2023-12-24 NOTE — NURSING NOTE
"Received pt sleeping in bed. Denies SI/HI/AH/VH. Denies anxiety and depression. Pt was fearful on approach. Stated \"They sedated me so I should be fine by tomorrow. I'm just tired.\" Pt was calm and cooperative. No behaviors noted. Will continue to monitor.  "

## 2023-12-25 PROCEDURE — 99232 SBSQ HOSP IP/OBS MODERATE 35: CPT | Performed by: STUDENT IN AN ORGANIZED HEALTH CARE EDUCATION/TRAINING PROGRAM

## 2023-12-25 RX ADMIN — OLANZAPINE 10 MG: 10 TABLET, ORALLY DISINTEGRATING ORAL at 21:46

## 2023-12-25 RX ADMIN — OLANZAPINE 5 MG: 5 TABLET, ORALLY DISINTEGRATING ORAL at 08:20

## 2023-12-25 NOTE — PLAN OF CARE
Problem: MERLINE  Goal: Will exhibit normal sleep and speech and no impulsivity  Description: INTERVENTIONS:  - Administer medication as ordered  - Set limits on impulsive behavior  - Make attempts to decrease external stimuli as possible  Outcome: Progressing     Problem: PSYCHOSIS  Goal: Will report no hallucinations or delusions  Description: Interventions:  - Administer medication as  ordered  - Every waking shifts and PRN assess for the presence of hallucinations and or delusions  - Assist with reality testing to support increasing orientation  - Assess if patient's hallucinations or delusions are encouraging self-harm or harm to others and intervene as appropriate  Outcome: Progressing     Problem: ANXIETY  Goal: Will report anxiety at manageable levels  Description: INTERVENTIONS:  - Administer medication as ordered  - Teach and encourage coping skills  - Provide emotional support  - Assess patient/family for anxiety and ability to cope  Outcome: Progressing     Problem: SLEEP DISTURBANCE  Goal: Will exhibit normal sleeping pattern  Description: Interventions:  -  Assess the patients sleep pattern, noting recent changes  - Administer medication as ordered  - Decrease environmental stimuli, including noise, as appropriate during the night  - Encourage the patient to actively participate in unit groups and or exercise during the day to enhance ability to achieve adequate sleep at night  - Assess the patient, in the morning, encouraging a description of sleep experience  Outcome: Progressing     Problem: SAFETY, RESTRAINT - VIOLENT/SELF-DESTRUCTIVE  Goal: Remains free of harm/injury from restraints (Restraint for Violent/Self-Destructive Behavior)  Description: INTERVENTIONS:  - Instruct patient/family regarding restraint use   - Assess and monitor physiologic and psychological status   - Provide interventions and comfort measures to meet assessed patient needs   - Ensure continuous in person monitoring is  provided   - Identify and implement measures to help patient regain control  - Assess readiness for release of restraint  Outcome: Progressing

## 2023-12-25 NOTE — PLAN OF CARE
Problem: MERLINE  Goal: Will exhibit normal sleep and speech and no impulsivity  Description: INTERVENTIONS:  - Administer medication as ordered  - Set limits on impulsive behavior  - Make attempts to decrease external stimuli as possible  Outcome: Progressing     Problem: PSYCHOSIS  Goal: Will report no hallucinations or delusions  Description: Interventions:  - Administer medication as  ordered  - Every waking shifts and PRN assess for the presence of hallucinations and or delusions  - Assist with reality testing to support increasing orientation  - Assess if patient's hallucinations or delusions are encouraging self-harm or harm to others and intervene as appropriate  Outcome: Progressing     Problem: BEHAVIOR  Goal: Pt/Family maintain appropriate behavior and adhere to behavioral management agreement, if implemented  Description: INTERVENTIONS:  - Assess the family dynamic   - Encourage verbalization of thoughts and concerns in a socially appropriate manner  - Assess patient/family's coping skills and non-compliant behavior (including use of illegal substances).  - Utilize positive, consistent limit setting strategies supporting safety of patient, staff and others  - Initiate consult with Case Management, Spiritual Care or other ancillary services as appropriate  - If a patient's/visitor's behavior jeopardizes the safety of the patient, staff, or others, refer to organization procedure.   - Notify Security of behavior or suspected illegal substances which indicate the need for search of the patient and/or belongings  - Encourage participation in the decision making process about a behavioral management agreement; implement if patient meets criteria  Outcome: Progressing     Problem: ANXIETY  Goal: Will report anxiety at manageable levels  Description: INTERVENTIONS:  - Administer medication as ordered  - Teach and encourage coping skills  - Provide emotional support  - Assess patient/family for anxiety and  ability to cope  Outcome: Progressing  Goal: By discharge: Patient will verbalize 2 strategies to deal with anxiety  Description: Interventions:  - Identify any obvious source/trigger to anxiety  - Staff will assist patient in applying identified coping technique/skills  - Encourage attendance of scheduled groups and activities  Outcome: Progressing     Problem: SLEEP DISTURBANCE  Goal: Will exhibit normal sleeping pattern  Description: Interventions:  -  Assess the patients sleep pattern, noting recent changes  - Administer medication as ordered  - Decrease environmental stimuli, including noise, as appropriate during the night  - Encourage the patient to actively participate in unit groups and or exercise during the day to enhance ability to achieve adequate sleep at night  - Assess the patient, in the morning, encouraging a description of sleep experience  Outcome: Progressing     Problem: SELF CARE DEFICIT  Goal: Return ADL status to a safe level of function  Description: INTERVENTIONS:  - Administer medication as ordered  - Assess ADL deficits and provide assistive devices as needed  - Obtain PT/OT consults as needed  - Assist and instruct patient to increase activity and self care as tolerated  Outcome: Progressing     Problem: INVOLUNTARY ADMIT  Goal: Will cooperate with staff recommendations and doctor's orders and will demonstrate appropriate behavior  Description: INTERVENTIONS:  - Treat underlying conditions and offer medication as ordered  - Educate regarding involuntary admission procedures and rules  - Utilize positive consistent limit setting strategies to support patient and staff safety  Outcome: Progressing     Problem: Alteration in Thoughts and Perception  Goal: Treatment Goal: Gain control of psychotic behaviors/thinking, reduce/eliminate presenting symptoms and demonstrate improved reality functioning upon discharge  Outcome: Progressing  Goal: Verbalize thoughts and feelings  Description:  Interventions:  - Promote a nonjudgmental and trusting relationship with the patient through active listening and therapeutic communication  - Assess patient's level of functioning, behavior and potential for risk  - Engage patient in 1 on 1 interactions  - Encourage patient to express fears, feelings, frustrations, and discuss symptoms    - [place] patient to reality, help patient recognize reality-based thinking   - Administer medications as ordered and assess for potential side effects  - Provide the patient education related to the signs and symptoms of the illness and desired effects of prescribed medications  Outcome: Progressing  Goal: Refrain from acting on delusional thinking/internal stimuli  Description: Interventions:  - Monitor patient closely, per order   - Utilize least restrictive measures   - Set reasonable limits, give positive feedback for acceptable   - Administer medications as ordered and monitor of potential side effects  Outcome: Progressing     Problem: SAFETY, RESTRAINT - VIOLENT/SELF-DESTRUCTIVE  Goal: Remains free of harm/injury from restraints (Restraint for Violent/Self-Destructive Behavior)  Description: INTERVENTIONS:  - Instruct patient/family regarding restraint use   - Assess and monitor physiologic and psychological status   - Provide interventions and comfort measures to meet assessed patient needs   - Ensure continuous in person monitoring is provided   - Identify and implement measures to help patient regain control  - Assess readiness for release of restraint  Outcome: Progressing  Goal: Returns to optimal restraint-free functioning  Description: INTERVENTIONS:  - Assess the patient's behavior and symptoms that indicate continued need for restraint  - Identify and implement measures to help patient regain control  - Assess readiness for release of restraint   Outcome: Progressing
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
## 2023-12-25 NOTE — NURSING NOTE
Pt compliant with wearing a mask while outside of room. Pt Flu positive. Pt scant during interaction. Denies SI/HI or hallucination. Pt loudly singing before breakfast in room. Denies any question or concern at this time.

## 2023-12-25 NOTE — NURSING NOTE
Patient remains compliant with medications. She reports some improvement in mood. Appears preoccupied and is disorganized/delusional at times Spent the afternoon sleeping. Pt denies SI, HI, AVH. She denies unmet needs. Sleep and appetite are adequate.

## 2023-12-25 NOTE — NURSING NOTE
Patient woke up around 130am. Reports sweating through clothes. Pt given new set of hospital attire and shower supplies. She reports feeling better. Feeling less fatigued. Temperature improved currently 98.7. She was given fluids. Denies unmet needs.
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
## 2023-12-26 PROCEDURE — 99232 SBSQ HOSP IP/OBS MODERATE 35: CPT | Performed by: STUDENT IN AN ORGANIZED HEALTH CARE EDUCATION/TRAINING PROGRAM

## 2023-12-26 RX ADMIN — OLANZAPINE 10 MG: 10 TABLET, ORALLY DISINTEGRATING ORAL at 21:21

## 2023-12-26 RX ADMIN — OLANZAPINE 5 MG: 5 TABLET, ORALLY DISINTEGRATING ORAL at 08:26

## 2023-12-26 RX ADMIN — ACETAMINOPHEN 975 MG: 325 TABLET, FILM COATED ORAL at 01:19
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
## 2023-12-26 NOTE — PROGRESS NOTES
12/26/23 2130   Team Meeting   Meeting Type Daily Rounds   Team Members Present   Team Members Present Physician;Nurse;;Other (Discipline and Name)   Physician Team Member Dr. Haley / Dr. Dominguez / ANAI Mistry   Nursing Team Member Shady / Heriberto   Care Management Team Member Vicky / Jacques / Jono / Anuel   Other (Discipline and Name) Maria Eugeniaund (Group Facilitator)   Patient/Family Present   Patient Present No   Patient's Family Present No       Status: Pt is Flu postive. Pt is scant and denying SI, HI, and AVH. Pt is singing loudly and believes Briseida is Satan. Pt signed in as a 201. Pt is compliant with medication and slept through the night.     Medication: No medication changes and no PRNs given.     D/C: No discharge date.

## 2023-12-26 NOTE — PROGRESS NOTES
Progress Note - Behavioral Health   Azalea Ruiz 33 y.o. female MRN: [id number]  Unit/Bed#: [facility] 202-01 Encounter: [id number]    Assessment/Plan   Principal Problem:    Bipolar 1 disorder (HCC)  Active Problems:    Medical clearance for psychiatric admission      Subjective: Patient was seen, chart was reviewed, and case was discussed with the team. Patient appears calm, drawing in her room and cooperative. Reports improvement in mood. Racing thoughts have diminished and feels that she is able to think more clearly. Denies any dissociative episodes or voices. Sleep and appetite are improving. Patient is compliant with medications. Patient denied adverse effects to their current psychiatric medication regimen. Discussed the importance of continuing to take medications as prescribed, as well as the importance of continuing to attend groups on the unit.    Behavior over the last 24 hours:  unchanged  Sleep: normal  Appetite: normal  Medication side effects: No    Medical ROS: Pertinent items are noted in HPI.no complaints    Current Medications:  Current Facility-Administered Medications   Medication Dose Route Frequency    acetaminophen (TYLENOL) tablet 650 mg  650 mg Oral Q6H PRN    acetaminophen (TYLENOL) tablet 650 mg  650 mg Oral Q4H PRN    acetaminophen (TYLENOL) tablet 975 mg  975 mg Oral Q6H PRN    aluminum-magnesium hydroxide-simethicone (MAALOX) oral suspension 30 mL  30 mL Oral Q4H PRN    artificial tear (LUBRIFRESH P.M.) ophthalmic ointment   Both Eyes 4x Daily PRN    benztropine (COGENTIN) injection 1 mg  1 mg Intramuscular BID PRN    haloperidol lactate (HALDOL) injection 5 mg  5 mg Intramuscular Q6H PRN Max 4/day    And    LORazepam (ATIVAN) injection 1 mg  1 mg Intramuscular Q6H PRN Max 4/day    And    benztropine (COGENTIN) injection 1 mg  1 mg Intramuscular Q6H PRN Max 4/day    haloperidol lactate (HALDOL) injection 10 mg  10 mg Intramuscular Q4H PRN Max 4/day    And    LORazepam (ATIVAN) injection 2  "mg  2 mg Intramuscular Q4H PRN Max 4/day    And    benztropine (COGENTIN) injection 1 mg  1 mg Intramuscular Q4H PRN Max 4/day    benztropine (COGENTIN) tablet 1 mg  1 mg Oral BID PRN    bisacodyl (DULCOLAX) rectal suppository 10 mg  10 mg Rectal Daily PRN    haloperidol (HALDOL) tablet 10 mg  10 mg Oral Q4H PRN Max 4/day    haloperidol (HALDOL) tablet 2 mg  2 mg Oral Q4H PRN Max 6/day    haloperidol (HALDOL) tablet 5 mg  5 mg Oral Q6H PRN Max 4/day    hydrOXYzine HCL (ATARAX) tablet 25 mg  25 mg Oral Q6H PRN Max 4/day    hydrOXYzine HCL (ATARAX) tablet 50 mg  50 mg Oral Q4H PRN Max 4/day    Or    LORazepam (ATIVAN) injection 1 mg  1 mg Intramuscular Q4H PRN    LORazepam (ATIVAN) tablet 1 mg  1 mg Oral Q4H PRN Max 6/day    Or    LORazepam (ATIVAN) injection 2 mg  2 mg Intramuscular Q6H PRN Max 3/day    magnesium hydroxide (MILK OF MAGNESIA) oral suspension 30 mL  30 mL Oral Daily PRN    OLANZapine (ZyPREXA ZYDIS) dispersible tablet 10 mg  10 mg Oral HS    OLANZapine (ZyPREXA ZYDIS) dispersible tablet 5 mg  5 mg Oral Daily    senna-docusate sodium (SENOKOT S) 8.6-50 mg per tablet 1 tablet  1 tablet Oral Daily PRN    traZODone (DESYREL) tablet 50 mg  50 mg Oral HS PRN       Behavioral Health Medications:   all current active meds have been reviewed.    Vitals:  Vitals:    12/26/23 0750   BP: (!) 121/104   Pulse: 84   Resp: 18   Temp: 97.9 °F (36.6 °C)   SpO2: 100%       Laboratory results:    I have personally reviewed all pertinent laboratory/tests results.    Mental Status Evaluation:    Appearance:  age appropriate   Behavior:  cooperative   Speech:  normal pitch and normal volume   Mood:  \"Better\"   Affect:  mood-congruent   Thought Process:  goal directed and logical   Thought Content:  normal   Perceptual Disturbances: None   Risk Potential: Suicidal Ideations none  Homicidal Ideations none  Potential for Aggression No   Sensorium:  person, place, and time/date   Memory:  recent and remote memory grossly intact "   Consciousness:  alert and awake    Attention: attention span appeared shorter than expected for age   Insight:  Improving   Judgment: fair   Gait/Station: normal gait/station   Motor Activity: no abnormal movements       Progress Toward Goals: Progressing. Patient is not at goal. They are not yet ready for discharge. The patient's condition currently requires active psychopharmacological medication management, interdisciplinary coordination with case management, and the utilization of adjunctive milieu and group therapy to augment psychopharmacological efficacy. The patient's risk of morbidity, and progression or decompensation of psychiatric disease, is higher without this current treatment.    Recommended Treatment: Continue with pharmacotherapy, group therapy, milieu therapy and occupational therapy.    Risks, benefits and possible side effects of Medications:   Risks, benefits, alternatives, and possible side effects of patient's psychiatric medications were discussed with patient.

## 2023-12-26 NOTE — NURSING NOTE
Patient cooperative with wearing her mask and eating meals in her room. Reports that she still has a head ache when she coughs, otherwise is starting to feel a bit better. Denies SI, HI, AVH. No reported questions or concerns at this time.

## 2023-12-26 NOTE — PLAN OF CARE
Problem: Ineffective Coping  Goal: Identifies healthy coping skills  Outcome: Progressing  Goal: Participates in unit activities  Description: Interventions:  - Provide therapeutic environment   - Provide required programming   - Redirect inappropriate behaviors   Outcome: Progressing

## 2023-12-26 NOTE — NURSING NOTE
Patient appeared to have slept mostly throughout the shift. Requested Tylenol once in the shift for headache and went back to sleep.

## 2023-12-27 PROCEDURE — 99232 SBSQ HOSP IP/OBS MODERATE 35: CPT | Performed by: PSYCHIATRY & NEUROLOGY

## 2023-12-27 RX ADMIN — OLANZAPINE 5 MG: 5 TABLET, ORALLY DISINTEGRATING ORAL at 08:22

## 2023-12-27 RX ADMIN — OLANZAPINE 10 MG: 10 TABLET, ORALLY DISINTEGRATING ORAL at 21:00

## 2023-12-27 NOTE — PROGRESS NOTES
12/27/23 0750   Team Meeting   Meeting Type Daily Rounds   Team Members Present   Team Members Present Physician;Nurse;;Other (Discipline and Name)   Physician Team Member Dr. Dominguez / ANAI Mistry   Nursing Team Member Shady / Heriberto   Care Management Team Member Vicky / Jacques / Jono   Other (Discipline and Name) Ezra (Group Facilitator)   Patient/Family Present   Patient Present No   Patient's Family Present No       Status: Pt is calm and feeling better with her flu symptoms. Pt is denying SI, HI, and AVH. Pt is compliant with medication. Pt slept through the night.     Medication: No medication changes and no PRNs needed.     D/C: Pt is scheduled to be discharged on Friday 12/29/2023.

## 2023-12-27 NOTE — NURSING NOTE
"Patient presents in [facility] hospital attire today. Reports no SI, HI or hallucinations. Pt stated, \"I feel perfect, just ready to get out of here.\" Pt denies physical complaints aside from a cough.  "

## 2023-12-27 NOTE — PROGRESS NOTES
"Progress Note - Behavioral Health     Azalea Ruiz 33 y.o. female MRN: [id number]   Unit/Bed#: [facility] 202-01 Encounter: [id number]    Behavior over the last 24 hours: unchanged.     Azalea is a 33-year-old female with a history of bipolar disorder and positive influenza status who presents for psychiatric follow-up.  Staff reports no behavioral issues overnight.  Upon approach, she is resting comfortably in her room, in no acute distress.  She reports doing well and denies any acute concerns.  She reports that her thoughts are more clear and her mood is more stable.  She continues to demonstrate some Tenriism preoccupation but not to the degree of any overt delusional material.  She appears less paranoid.  Denies any SI or HI.  Denies any AH or VH.  Overall, remained fairly superficial with her interaction today.    Sleep: normal  Appetite: normal  Medication side effects: Yes - increased appetite    ROS: all other systems are negative    Mental Status Evaluation:    Appearance: Hospital attire, appears consistent with stated age, normal grooming, wearing a mask  Motor: no psychomotor disturbances, no gait abnormalities  Behavior: Pleasant, calm, cooperative, interacts with this writer appropriately  Speech: normal rate, rhythm, and volume  Mood: \"Pretty good\" less irritable  Affect: Constricted, mood-congruent  Thought Process: organized, linear, and goal-oriented; concrete associations  Thought Content: denies any delusional material, + Tenriism preoccupation  Perception: denies any auditory or visual hallucinations, denies other perceptual disturbances  Risk Potential: denies suicidal ideation, plan, or intent. Denies homicidal ideation  Sensorium: Oriented to person, place, time, and situation  Cognition: cognitive ability appears intact but was not quantitatively tested  Consciousness: alert and awake  Attention/Concentration: shorter than expected for age  Insight: Limited  Judgement: Fair    Vital signs in " last 24 hours:    Temp:  [97.7 °F (36.5 °C)] 97.7 °F (36.5 °C)  HR:  [78] 78  Resp:  [18] 18  BP: (124-133)/(80-86) 124/86    Laboratory results: I have personally reviewed all pertinent laboratory/tests results    Results from the past 24 hours: No results found for this or any previous visit (from the past 24 hour(s)).  Most Recent Labs:   Lab Results   Component Value Date    WBC 9.42 12/21/2023    RBC 4.07 12/21/2023    HGB 11.8 12/21/2023    HCT 37.2 12/21/2023     12/21/2023    RDW 15.3 (H) 12/21/2023    NEUTROABS 5.50 12/21/2023    SODIUM 137 12/21/2023    K 3.2 (L) 12/21/2023     12/21/2023    CO2 21 12/21/2023    BUN 12 12/21/2023    CREATININE 0.81 12/21/2023    GLUC 156 (H) 12/21/2023    CALCIUM 9.5 12/21/2023    AST 19 12/21/2023    ALT 10 12/21/2023    ALKPHOS 63 12/21/2023    TP 7.3 12/21/2023    ALB 4.3 12/21/2023    TBILI 0.80 12/21/2023    CHOLESTEROL 218 (H) 09/19/2022    HDL 66 09/19/2022    TRIG 78 09/19/2022    LDLCALC 136 (H) 09/19/2022    NONHDLC 152 09/19/2022    PREGUR Negative 07/18/2022    PREGSERUM Negative 12/22/2023    HCGQUANT 19,875 (H) 04/08/2020       Progress Toward Goals: progressing    Assessment/Plan   Principal Problem:    Bipolar 1 disorder (HCC)  Active Problems:    Medical clearance for psychiatric admission      Recommended Treatment:     Planned medication and treatment changes:    All current active medications have been reviewed  Encourage group therapy, milieu therapy and occupational therapy  Behavioral Health checks every 7 minutes    Continue current medications.  Patient states that she spoke with the previous psychiatrist about discharging on Friday.  She is comfortable with her current psychotropic regimen and does not wish to change it at this time.    Per conversations with case management, the patient is to be discharged to police custody for outstanding legal charges.    Current Facility-Administered Medications   Medication Dose Route Frequency  Provider Last Rate    acetaminophen  650 mg Oral Q6H PRN [facility], PA-C      acetaminophen  650 mg Oral Q4H PRN [facility], PA-C      acetaminophen  975 mg Oral Q6H PRN [facility], PA-C      aluminum-magnesium hydroxide-simethicone  30 mL Oral Q4H PRN [facility], PA-C      artificial tear   Both Eyes 4x Daily PRN [facility], PA-C      benztropine  1 mg Intramuscular BID PRN [facility], PA-C      haloperidol lactate  5 mg Intramuscular Q6H PRN Max 4/day [facility], PA-C      And    LORazepam  1 mg Intramuscular Q6H PRN Max 4/day [facility], PA-C      And    benztropine  1 mg Intramuscular Q6H PRN Max 4/day [facility], PA-C      haloperidol lactate  10 mg Intramuscular Q4H PRN Max 4/day [facility], PA-C      And    LORazepam  2 mg Intramuscular Q4H PRN Max 4/day [facility], PA-C      And    benztropine  1 mg Intramuscular Q4H PRN Max 4/day [facility], PA-C      benztropine  1 mg Oral BID PRN [facility], PA-C      bisacodyl  10 mg Rectal Daily PRN [facility], PA-C      haloperidol  10 mg Oral Q4H PRN Max 4/day [facility], PA-C      haloperidol  2 mg Oral Q4H PRN Max 6/day [facility], PA-C      haloperidol  5 mg Oral Q6H PRN Max 4/day [facility], PA-C      hydrOXYzine HCL  25 mg Oral Q6H PRN Max 4/day [facility], PA-C      hydrOXYzine HCL  50 mg Oral Q4H PRN Max 4/day [facility], PA-C      Or    LORazepam  1 mg Intramuscular Q4H PRN [facility], PA-C      LORazepam  1 mg Oral Q4H PRN Max 6/day [facility], PA-C      Or    LORazepam  2 mg Intramuscular Q6H PRN Max 3/day [facility], PA-C      magnesium hydroxide  30 mL Oral Daily PRN [facility]      OLANZapine  10 mg Oral HS Nemo Garza MD      OLANZapine  5 mg Oral Daily MD nima Stovall-docusate sodium  1 tablet Oral Daily PRN Stephon Mistry PA-C      traZODone  50 mg Oral HS PRN Stephon Mistry PA-C       Risks / Benefits of  Treatment:    Risks, benefits, and possible side effects of medications explained to patient and patient verbalizes understanding and agreement for treatment.    Counseling / Coordination of Care:    Patient's progress discussed with staff in treatment team meeting.  Medications, treatment progress and treatment plan reviewed with patient.    Stephon Mistry PA-C 12/27/23
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
## 2023-12-27 NOTE — PLAN OF CARE
Problem: MERLINE  Goal: Will exhibit normal sleep and speech and no impulsivity  Description: INTERVENTIONS:  - Administer medication as ordered  - Set limits on impulsive behavior  - Make attempts to decrease external stimuli as possible  Outcome: Progressing     Problem: PSYCHOSIS  Goal: Will report no hallucinations or delusions  Description: Interventions:  - Administer medication as  ordered  - Every waking shifts and PRN assess for the presence of hallucinations and or delusions  - Assist with reality testing to support increasing orientation  - Assess if patient's hallucinations or delusions are encouraging self-harm or harm to others and intervene as appropriate  Outcome: Progressing     Problem: ANXIETY  Goal: Will report anxiety at manageable levels  Description: INTERVENTIONS:  - Administer medication as ordered  - Teach and encourage coping skills  - Provide emotional support  - Assess patient/family for anxiety and ability to cope  Outcome: Progressing     Problem: SELF CARE DEFICIT  Goal: Return ADL status to a safe level of function  Description: INTERVENTIONS:  - Administer medication as ordered  - Assess ADL deficits and provide assistive devices as needed  - Obtain PT/OT consults as needed  - Assist and instruct patient to increase activity and self care as tolerated  Outcome: Progressing     Problem: Alteration in Thoughts and Perception  Goal: Verbalize thoughts and feelings  Description: Interventions:  - Promote a nonjudgmental and trusting relationship with the patient through active listening and therapeutic communication  - Assess patient's level of functioning, behavior and potential for risk  - Engage patient in 1 on 1 interactions  - Encourage patient to express fears, feelings, frustrations, and discuss symptoms    - Dingle patient to reality, help patient recognize reality-based thinking   - Administer medications as ordered and assess for potential side effects  - Provide the patient  education related to the signs and symptoms of the illness and desired effects of prescribed medications  Outcome: Progressing  Goal: Refrain from acting on delusional thinking/internal stimuli  Description: Interventions:  - Monitor patient closely, per order   - Utilize least restrictive measures   - Set reasonable limits, give positive feedback for acceptable   - Administer medications as ordered and monitor of potential side effects  Outcome: Progressing     Problem: INVOLUNTARY ADMIT  Goal: Will cooperate with staff recommendations and doctor's orders and will demonstrate appropriate behavior  Description: INTERVENTIONS:  - Treat underlying conditions and offer medication as ordered  - Educate regarding involuntary admission procedures and rules  - Utilize positive consistent limit setting strategies to support patient and staff safety  Outcome: Progressing

## 2023-12-27 NOTE — NURSING NOTE
Azalea is calm upon approach, observed resting in bed and remains compliant with wearing a mask. Patient denies current SI/HI/AVH, reports physically feeling slight improvement in symptoms, aside from mild headache. Azalea denied need for PRN intervention at this time. Patient remains compliant with prescribed medication and was encouraged to seek staff with any issues and/or concerns, verbalized understanding.

## 2023-12-28 PROBLEM — Z00.8 MEDICAL CLEARANCE FOR PSYCHIATRIC ADMISSION: Status: RESOLVED | Noted: 2020-07-07 | Resolved: 2023-12-28

## 2023-12-28 PROCEDURE — 99232 SBSQ HOSP IP/OBS MODERATE 35: CPT | Performed by: PSYCHIATRY & NEUROLOGY

## 2023-12-28 RX ORDER — BENZONATATE 100 MG/1
100 CAPSULE ORAL 3 TIMES DAILY PRN
Status: DISCONTINUED | OUTPATIENT
Start: 2023-12-28 | End: 2023-12-29 | Stop reason: HOSPADM

## 2023-12-28 RX ADMIN — OLANZAPINE 5 MG: 5 TABLET, ORALLY DISINTEGRATING ORAL at 08:05

## 2023-12-28 RX ADMIN — OLANZAPINE 10 MG: 10 TABLET, ORALLY DISINTEGRATING ORAL at 21:04

## 2023-12-28 NOTE — PLAN OF CARE
Problem: ANXIETY  Goal: Will report anxiety at manageable levels  Description: INTERVENTIONS:  - Administer medication as ordered  - Teach and encourage coping skills  - Provide emotional support  - Assess patient/family for anxiety and ability to cope  Outcome: Progressing

## 2023-12-28 NOTE — PLAN OF CARE
Problem: DISCHARGE PLANNING  Goal: Discharge to home or other facility with appropriate resources  Description: INTERVENTIONS:  - Identify barriers to discharge w/patient and caregiver  - Arrange for needed discharge resources and transportation as appropriate  - Identify discharge learning needs (meds, wound care, etc.)  - Arrange for interpretive services to assist at discharge as needed  - Refer to Case Management Department for coordinating discharge planning if the patient needs post-hospital services based on physician/advanced practitioner order or complex needs related to functional status, cognitive ability, or social support system  Outcome: Adequate for Discharge  Note: Pt is scheduled to be discharged on 12/29/2023 to return to her residence. Pt is being discharged with [facility] through [facility] and a referral to [facility] for case management.

## 2023-12-28 NOTE — DISCHARGE INSTR - OTHER ORDERS
[facility] CRISIS INFORMATION     Warmline is a confidential 7 days/week telephone support service manned by trained mental health consumers.  Warmline operates daily but is not able to accept calls between 2AM-6AM.   Warmline provides support, a listening ear and can provide information about available services. Warmline specializes in the concerns of mental health consumers, their families and friends.  However, we are also here for anyone who has a mental health concern, is confused about or just doesn't know anything about mental health or where to get information. To reach [facility], call [phone] accepts calls between 6:00 AM to 10:00 AM and from 4:00 PM to 12:00 AM.     Text CONNECT to 046096 from anywhere in the [place], anytime, about any type of crisis.  A live, trained Crisis Counselor receives the text and lets you know that they are here to listen.  The volunteer Crisis Counselor will help you move from a hot moment to a cool moment.    [facility] Crisis Intervention - licensed telephone and mobile crisis services that provide mental health assessments to all age groups regardless of income or insurance.  Crisis Intervention operates 24-hour/7 days a week. [facility] Crisis Intervention assists consumer who are experiencing a mental health emergency and lack the resources to assist themselves.  Immediate intervention for suicidal and depressed individuals with home visits/outreach being top priority. Crisis can be contacted at [phone].     The National [place] on Mental Illness (AMISHA) offers various education & support groups for you & your family.  For more information visit their website at   http://www.amisha-lv.org/.  Dial 2-1-1 to get connected/get help.  Free, confidential information & referral available 24/7: Aging Services, Child & Youth Services, Counseling, Education/Training, Food/Shelter/Clothing, Health Services, Parenting, Substance Abuse, Support Groups, Volunteer  Opportunities, & much more.  Phone: 2-1-1 or 574-690-1108, Web: www.ht371wzuy.org, Email: 211@[facility].org    [facility] Drug and Alcohol Administration Resources   ([phone]  For additional information, contact the Department of Human Services Information and Referral Unit at  (377) 817-5530 between the hours of 8:30 a.m. and 4:30 p.m., Monday through Friday.    An assessment is the first step in the process for [facility] residents to get the assistance they need.  Any of the providers listed below are able to complete an assessment.  After contacting a provider, an appointment for the assessment is scheduled.  During the appointment an  will gather information to determine the level of treatment that is needed.  In addition, assistance will be provided in completing the necessary paperwork to access treatment including a liability determination, release(s) of information, and may also include an application to the Department of Public Welfare for Medical Assistance.  The assessment process may take up to 2 hours to complete.  Upon completion of the paperwork and assessment process, the  will determine the recommended level of care needed and provide assistance with the referral process.     American Organization - provides substance abuse assessment services for adults.  [address] [place] [address]  Phone: [phone] ext. [phone]  Fax: [phone]  Walk in hours Mondays - Fridays 8AM-3PM     [facility]) - provides screening and assessment, outpatient and intensive outpatient services for both adolescents and adults.  Day and evening services available.  [address] [place] [address]  Phone: [phone]  Fax: [phone]     [employer] [facility]. [facility] - is a dual diagnosis outpatient program that provides assessment / treatment recommendations, individual, group and  family therapy, intensive outpatient therapy, outpatient therapy, professional consultations, educational presentations and workshops, and urine screens for drugs of abuse.    [facility]  [address], [address]  [place] [address]  Phone: [phone]  Fax: [phone]  Walk in hours Mondays 9AM-5PM and Tuesdays - Fridays 9AM-2PM     [facility] [employer] [facility] - provides intensive outpatient and outpatient treatment services to adolescents, adults and families experiencing drug and/or alcohol problems.  Treatment modalities include individual, group and family counseling.  Weekend DUI classes are available.  [employer] is a division of [employer].  [facility] [place] [address]  Phone: [phone]  Fax: [phone]  Walk in hours Mondays - Fridays 8:30AM-3:30PM      Primary Care Doctor Services    When your insurance becomes active Medical Assistance will send you paperwork to choose your primary care doctor.    If you should need services before that below are options that work with uninsured patients.     [facility] provides comprehensive well and sick primary care, OB/GYN, dental and pediatric health services to the medically underserved, including the uninsured and underinsured. [facility], [facility] provides services in a community-based setting with experienced and compassionate physicians and other providers. It supports the health and wellness goals of local residents and specializes in providing improved access, coordinated care and enhanced patient / family involvement.  [address]  [address]  [phone]  Monday - Friday: 8 am - 5 pm  We provide care in a variety of areas, including: routine physical exams, wellness visits for infants through adults, including children's immunizations. In addition, our services include blood tests and lab  studies; women's health care, including Pap smears; care and management of diabetes, asthma and high blood pressure.    [facility]  [facility]  [address]  [address]  [facility] [address]  [phone]  Monday through Friday: 8 am - 5 pm  Saturday: 8 am - 1 pm  Health screenings  Preventive care  Care for acute illnesses and minor problems  Care of chronic illnesses  Physical examinations, including gynecological exams and Pap smears  Patient education  Immunizations  We provide care in a variety of areas, including: routine physical exams, wellness visits for infants through adults, including children's immunizations. In addition, our services include blood tests and lab studies; women's health care, including Pap smears; care and management of diabetes, asthma and high blood pressure.    [address]  [place]  [address]  [phone]  Monday - Friday: 8 am - 5 pm  Health screenings  Preventive care  Care for acute illnesses and minor problems  Care of chronic illnesses  Physical examinations  Patient education  Immunizations  We offer assistance in accessing various resources through our , Financial Counselor and Outpatient Care Manager. In addition, we have a Certified  on site to help facilitate optimal communication with patients, care and management of diabetes and other chronic illnesses, including transitioning from hospital to home through specialized office visits. We can perform many point-of-care tests at the time of your visit including, EKG's, Hemoglobin A1C, blood glucose fingerstick, diabetic eye exams, urine dip, spirometry and hearing/vision tests.    [facility]   LOCATION:  [facility] [place]  (located inside [facility])  [address] [address]  PHONE:  [phone]  HOURS:  Monday: 8 am - 8 pm  Tuesday: 8 am - 8 pm  Wednesday: 8 am - 4:30 pm  Thursday: 8 am - 8 pm  Friday: 8 am - 6 pm  Saturday: Closed  Sunday: Closed    [facility]  LOCATION:  [address] [place] [address]  PHONE: [phone]  HOURS:  Monday: 8 am - 4:30 pm  Tuesday: 8 am - 4:30 pm  Wednesday: 8 am - 4:30 pm  Thursday: 8 am - 4:30 pm  Friday: 8 am - 4:30 pm  Saturday: Closed  Sunday: Closed    [facility]  (inside [facility])  [address] [place] [address]  PHONE: [phone]  HOURS:  Monday: 8 am - 8 pm  Tuesday: 8 am - 4:30 pm  Wednesday: 8 am - 4:30 pm  Thursday: 8 am - 8 pm  Friday: 8 am - 4:30 pm  Saturday: Closed  Sunday: Closed    [facility]  LOCATION:  [facility]  [address] [place] [address]  PHONE: [phone]  HOURS:  Please note that our hours have changed.  Monday: 8 am - 8 pm  Tuesday: 8 am - 8 pm  Wednesday: 8 am - 4:30 pm  Thursday: 8 am - 8 pm  Friday: 8 am - 6 pm  Saturday: Closed  Sunday: Closed    [place] [facility] RESOURCE GUIDE     Zurdo is the main contact from [facility] and her direct number is [phone], her email contact is [email or web address].     [facility]  Season runs November 1, 2021 through April 30, 2022. From November 1 through November 30   shelter will only be operating when it is 32 degrees or below. Visit website for status update.  Winter shelter for single men and single women. Registration 7:00pm to 9:00pm (Only employed   guests with written proof of employment may enter station later). First come, first served. Lights   out at 10:30pm. Lights on at 5:45am. Meals will be served Monday through Friday for clients   staying there only. Clients are required to wear a mask with the exception of eating and sleeping.  Address:  [address] [place] [address]  [place]  [place] Sánchez [address]  Eligibility: Homeless single men and women 18 years and older who have no other shelter   options; Unable to serve families Must have no open criminal warrants or sexual offender status   found on background check through [employer],  and [address].  Hours: Monday through Sunday, 7:00pm to 7:00am  Phone: [phone]      [employer] 2-1-1:   Call: 211 or [phone] Website: http://www.[employer]/  This is a toll free, confidential; 24-hour-a-day service which connects you to a community  in your area who can help you find services and resources that are available to you locally and provide critical services that can improve and save lives.     National Suicide Prevention Hotline  1-180.814.6039    National de Prevencion del Suicidio  8-980-466-3234    [place] Drug & Alcohol Helpline  1-619.816.4556    Crisis Text Line is free, 24/7 support for those in crisis. Text HOME to 124038 from anywhere in the [place] to text with a trained Crisis Counselor

## 2023-12-28 NOTE — PROGRESS NOTES
"Progress Note - Behavioral Health     Azalea Ruiz 33 y.o. female MRN: [id number]   Unit/Bed#: [facility] 202-01 Encounter: [id number]    Behavior over the last 24 hours: unchanged.     Azalea is a 33-year-old female with a history of bipolar disorder and positive influenza status who presents for psychiatric follow-up.  Staff reports no behavioral issues overnight.  Upon approach, she is mostly superficial in her responses, appears a bit guarded.  She offers limited insight into her condition and tells me that she does not have bipolar disorder as a diagnosis but rather \"my diagnosis is that I cannot deal with other people's bullshit.\"  She remains paranoid of her neighbors and states that they are the reason she is currently in the hospital, not for psychiatric concerns.  Despite this, she reports doing well on Zyprexa and feels it helps stabilize her mood.  She plans on continuing the Zyprexa following discharge tomorrow and says she is motivated to participate in aftercare in the outpatient setting.  She adamantly denies any homicidal, angry or violent thoughts towards her neighbors or anyone else.  She adamantly denies any suicidal ideations.  No AH or VH.  She does endorse sore throat and cough related to having the flu.    Sleep: normal  Appetite: normal  Medication side effects: No   ROS: reports cough and sore throat, all other systems are negative    Mental Status Evaluation:    Appearance: Hospital attire, appears consistent with stated age, normal grooming, wearing a mask  Motor: no psychomotor disturbances, no gait abnormalities  Behavior: Superficially cooperative, calm  Speech: normal rate, rhythm, and volume  Mood: \"Pretty good\" less irritable  Affect: Constricted, mood-congruent  Thought Process: organized, linear, and goal-oriented; concrete associations  Thought Content: Paranoid ideation, no longer demonstrating Confucianist preoccupation  Perception: denies any auditory or visual hallucinations, denies " other perceptual disturbances  Risk Potential: denies suicidal ideation, plan, or intent. Denies homicidal ideation  Sensorium: Oriented to person, place, time, and situation  Cognition: cognitive ability appears intact but was not quantitatively tested  Consciousness: alert and awake  Attention/Concentration: shorter than expected for age  Insight: Limited  Judgement: Fair    Vital signs in last 24 hours:    Temp:  [0 °F (-17.8 °C)-98.9 °F (37.2 °C)] 98.9 °F (37.2 °C)  HR:  [77-84] 84  Resp:  [16-18] 18  BP: (139-141)/(79-91) 139/91    Laboratory results: I have personally reviewed all pertinent laboratory/tests results    Results from the past 24 hours: No results found for this or any previous visit (from the past 24 hour(s)).  Most Recent Labs:   Lab Results   Component Value Date    WBC 9.42 12/21/2023    RBC 4.07 12/21/2023    HGB 11.8 12/21/2023    HCT 37.2 12/21/2023     12/21/2023    RDW 15.3 (H) 12/21/2023    NEUTROABS 5.50 12/21/2023    SODIUM 137 12/21/2023    K 3.2 (L) 12/21/2023     12/21/2023    CO2 21 12/21/2023    BUN 12 12/21/2023    CREATININE 0.81 12/21/2023    GLUC 156 (H) 12/21/2023    CALCIUM 9.5 12/21/2023    AST 19 12/21/2023    ALT 10 12/21/2023    ALKPHOS 63 12/21/2023    TP 7.3 12/21/2023    ALB 4.3 12/21/2023    TBILI 0.80 12/21/2023    CHOLESTEROL 218 (H) 09/19/2022    HDL 66 09/19/2022    TRIG 78 09/19/2022    LDLCALC 136 (H) 09/19/2022    NONHDLC 152 09/19/2022    PREGUR Negative 07/18/2022    PREGSERUM Negative 12/22/2023    HCGQUANT 19,875 (H) 04/08/2020       Progress Toward Goals: progressing    Assessment/Plan   Principal Problem:    Bipolar 1 disorder (HCC)  Active Problems:    Medical clearance for psychiatric admission      Recommended Treatment:     Planned medication and treatment changes:    All current active medications have been reviewed  Encourage group therapy, milieu therapy and occupational therapy  Behavioral Health checks every 7 minutes    Continue  current medications.  On track for scheduled discharge tomorrow.  Though she endorses some paranoia regarding her neighbors, we conducted a thorough safety assessment and she harbors no angry or violent thoughts towards them.  She denies having access to firearms.  Though she expresses paranoid thoughts, she does not appear overtly delusional and is able to reality test appropriately.  She indicates that if she returns home and gets into an argument with her neighbors, then her plan would be to remove herself from the situation and contact her support system so that she can talk through her emotions.    Plans on continuing medications and attending follow-ups.  There are no legal grounds for involuntary commitment at this time.    Current Facility-Administered Medications   Medication Dose Route Frequency Provider Last Rate    acetaminophen  650 mg Oral Q6H PRN [facility] M[facility], PA-C      acetaminophen  650 mg Oral Q4H PRN [facility], PA-C      acetaminophen  975 mg Oral Q6H PRN [facility], PA-C      aluminum-magnesium hydroxide-simethicone  30 mL Oral Q4H PRN [facility]      artificial tear   Both Eyes 4x Daily PRN [facility] M[facility], PA-C      benzonatate  100 mg Oral TID PRN [facility], PA-C      benztropine  1 mg Intramuscular BID PRN [facility], PA-C      haloperidol lactate  5 mg Intramuscular Q6H PRN Max 4/day [facility], PA-C      And    LORazepam  1 mg Intramuscular Q6H PRN Max 4/day [facility], PA-C      And    benztropine  1 mg Intramuscular Q6H PRN Max 4/day [facility], PA-C      haloperidol lactate  10 mg Intramuscular Q4H PRN Max 4/day [facility], [facility]      And    LORazepam  2 mg Intramuscular Q4H PRN Max 4/day [facility], [facility]-C      And    benztropine  1 mg Intramuscular Q4H PRN Max 4/day [facility], PA-C      benztropine  1 mg Oral BID PRN [facility], [facility]-C      bisacodyl  10 mg Rectal Daily PRN [facility], PA-C      haloperidol  10 mg  Oral Q4H PRN Max 4/day Stephon Mistry PA-C      haloperidol  2 mg Oral Q4H PRN Max 6/day Stephon Mistry PA-C      haloperidol  5 mg Oral Q6H PRN Max 4/day Stephon Mistry PA-C      hydrOXYzine HCL  25 mg Oral Q6H PRN Max 4/day Stephon Mistry, ANAI      hydrOXYzine HCL  50 mg Oral Q4H PRN Max 4/day Stephon Mistry PA-C      Or    LORazepam  1 mg Intramuscular Q4H PRN Stpehon Mistry PA-C      LORazepam  1 mg Oral Q4H PRN Max 6/day Stephon Mistry PA-C      Or    LORazepam  2 mg Intramuscular Q6H PRN Max 3/day Stephon Mistry PA-C      magnesium hydroxide  30 mL Oral Daily PRN Stephon Mistry PA-C      OLANZapine  10 mg Oral HS Nemo Garza MD      OLANZapine  5 mg Oral Daily Nemo Garza MD      phenol  1 spray Mouth/Throat Q2H PRN Stephon Mistry PA-C      senna-docusate sodium  1 tablet Oral Daily PRN Stephon Mistry PA-C      traZODone  50 mg Oral HS PRN Stephon Mistry PA-C       Risks / Benefits of Treatment:    Risks, benefits, and possible side effects of medications explained to patient and patient verbalizes understanding and agreement for treatment.    Counseling / Coordination of Care:    Patient's progress discussed with staff in treatment team meeting.  Medications, treatment progress and treatment plan reviewed with patient.    Stephon Mistry PA-C 12/28/23

## 2023-12-28 NOTE — PROGRESS NOTES
12/28/23 0750   Team Meeting   Meeting Type Daily Rounds   Team Members Present   Team Members Present Physician;Nurse;;Other (Discipline and Name)   Physician Team Member Dr. Dominguez / ANAI Mistry   Nursing Team Member Shady / Wojciech   Care Management Team Member Vicky / Jacques   Other (Discipline and Name) Ezra (Group Facilitator)   Patient/Family Present   Patient Present No   Patient's Family Present No       Status: Pt is withdrawn to room due to Flu. Pt is only reporting cough as flu symptoms. Pt is anxious about discharge. Pt is denying SI, HI, and AVH. Pt slept through the night.     Medication: No medication changes and no PRNs given.     D/C: Pt is scheduled to be discharged on Friday 12/29/2023.

## 2023-12-28 NOTE — DISCHARGE INSTR - APPOINTMENTS
You have confirmed and will be discharged to address [address] [address].  You have confirmed and will be contacted at phone number [phone].  Your  while you were at [facility] was Erick SORIA who can be reached at phone number [phone] and fax number [phone].    Rohini, or Nuris, our Behavioral Health Nurse Navigators, will be calling you after your discharge, on the phone number that you provided.  They will be available as an additional support, if needed.   If you wish to speak with one of them, you may contact Rohini at [phone] or Nuris at [phone].

## 2023-12-28 NOTE — CASE MANAGEMENT
CM met with Pt to confirm discharge planning. CM reminded Pt of her follow up appointment with [facility] and that the referral was sent for [facility]. CM suggested she call [facility] to schedule an intake and that they will come to her. CM stated that they will schedule her with a Lyft for tomorrow. Pt signed free local transportation waiver. Pt verbalized understanding and stated she is ready for discharge.     CM scheduled Lyft for 12/29/2023 @10am via [employer].

## 2023-12-28 NOTE — NURSING NOTE
"Received pt resting in bed. Quiet and isolative. Reports mild anxiety regarding discharge. Stated \"I'm fine. Just ready to go home.\" Denies SI/HI/AH/VH. Denies depression. Remains room seclusive. Maintained on droplet precautions for influenza. NAD. Will continue to monitor.  "

## 2023-12-28 NOTE — NURSING NOTE
Pt cooperative with wearing a mask when in the milieu. Pt compliant with medication. Educated on prns available for flu symptoms. Declining at this time. Denies SI/HI or hallucination. Withdrawn to room, scant during interaction.
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
## 2023-12-28 NOTE — BH TRANSITION RECORD
Contact Information: If you have any questions, concerns, pended studies, tests and/or procedures, or emergencies regarding your inpatient behavioral health visit. Please contact [place] [facility] [phone] and ask to speak to a , nurse or physician. A contact is available 24 hours/ 7 days a week at this number.     Summary of Procedures Performed During your Stay:  Below is a list of major procedures performed during your hospital stay and a summary of results:  - No major procedures performed.    Pending Studies (From admission, onward)      None          Please follow up on the above pending studies with your PCP and/or referring provider.

## 2023-12-29 VITALS
HEART RATE: 80 BPM | TEMPERATURE: 97.2 F | SYSTOLIC BLOOD PRESSURE: 136 MMHG | RESPIRATION RATE: 18 BRPM | DIASTOLIC BLOOD PRESSURE: 90 MMHG | HEIGHT: 63 IN | WEIGHT: 212.8 LBS | BODY MASS INDEX: 37.7 KG/M2 | OXYGEN SATURATION: 100 %

## 2023-12-29 PROCEDURE — 99239 HOSP IP/OBS DSCHRG MGMT >30: CPT | Performed by: PSYCHIATRY & NEUROLOGY

## 2023-12-29 RX ORDER — OLANZAPINE 10 MG/1
10 TABLET ORAL
Qty: 30 TABLET | Refills: 1 | Status: SHIPPED | OUTPATIENT
Start: 2023-12-29 | End: 2024-02-27

## 2023-12-29 RX ORDER — OLANZAPINE 5 MG/1
5 TABLET ORAL DAILY
Qty: 30 TABLET | Refills: 1 | Status: SHIPPED | OUTPATIENT
Start: 2023-12-29 | End: 2024-02-27

## 2023-12-29 RX ADMIN — OLANZAPINE 5 MG: 5 TABLET, ORALLY DISINTEGRATING ORAL at 08:17

## 2023-12-29 NOTE — NURSING NOTE
Pt remains pleasant and calm. Denies SI/HI, reports feeling that mood has improved and is feeling ready for discharge. Pt reports she slept well. Denies any questions at this time.

## 2023-12-29 NOTE — NURSING NOTE
Pt calm and pleasant. Isolative and room seclusive. Denies SI/HI/AH/VH. Did not attend group. Remains on droplet precautions for influenza. NAD. Will continue to monitor.

## 2023-12-29 NOTE — TREATMENT TEAM
12/29/23 0900   Activity/Group Checklist   Group Admission/Discharge  (Relapse prevention plan)   Attendance Did not attend  (Declined relapse prevention plan upon invitation)

## 2023-12-29 NOTE — PLAN OF CARE
Problem: BEHAVIOR  Goal: Pt/Family maintain appropriate behavior and adhere to behavioral management agreement, if implemented  Description: INTERVENTIONS:  - Assess the family dynamic   - Encourage verbalization of thoughts and concerns in a socially appropriate manner  - Assess patient/family's coping skills and non-compliant behavior (including use of illegal substances).  - Utilize positive, consistent limit setting strategies supporting safety of patient, staff and others  - Initiate consult with Case Management, Spiritual Care or other ancillary services as appropriate  - If a patient's/visitor's behavior jeopardizes the safety of the patient, staff, or others, refer to organization procedure.   - Notify Security of behavior or suspected illegal substances which indicate the need for search of the patient and/or belongings  - Encourage participation in the decision making process about a behavioral management agreement; implement if patient meets criteria  Outcome: Progressing

## 2023-12-29 NOTE — TREATMENT TEAM
12/29/23 0512   Mental Status Exam   Sleep Pattern Sleeps all night     Patient appears to have slept through the night. No s/s of distress noted. Safety rounding maintained through the night.

## 2023-12-29 NOTE — PLAN OF CARE
Problem: MERLINE  Goal: Will exhibit normal sleep and speech and no impulsivity  Description: INTERVENTIONS:  - Administer medication as ordered  - Set limits on impulsive behavior  - Make attempts to decrease external stimuli as possible  Outcome: Adequate for Discharge     Problem: PSYCHOSIS  Goal: Will report no hallucinations or delusions  Description: Interventions:  - Administer medication as  ordered  - Every waking shifts and PRN assess for the presence of hallucinations and or delusions  - Assist with reality testing to support increasing orientation  - Assess if patient's hallucinations or delusions are encouraging self-harm or harm to others and intervene as appropriate  Outcome: Adequate for Discharge     Problem: BEHAVIOR  Goal: Pt/Family maintain appropriate behavior and adhere to behavioral management agreement, if implemented  Description: INTERVENTIONS:  - Assess the family dynamic   - Encourage verbalization of thoughts and concerns in a socially appropriate manner  - Assess patient/family's coping skills and non-compliant behavior (including use of illegal substances).  - Utilize positive, consistent limit setting strategies supporting safety of patient, staff and others  - Initiate consult with Case Management, Spiritual Care or other ancillary services as appropriate  - If a patient's/visitor's behavior jeopardizes the safety of the patient, staff, or others, refer to organization procedure.   - Notify Security of behavior or suspected illegal substances which indicate the need for search of the patient and/or belongings  - Encourage participation in the decision making process about a behavioral management agreement; implement if patient meets criteria  Outcome: Adequate for Discharge     Problem: ANXIETY  Goal: Will report anxiety at manageable levels  Description: INTERVENTIONS:  - Administer medication as ordered  - Teach and encourage coping skills  - Provide emotional support  - Assess  patient/family for anxiety and ability to cope  Outcome: Adequate for Discharge  Goal: By discharge: Patient will verbalize 2 strategies to deal with anxiety  Description: Interventions:  - Identify any obvious source/trigger to anxiety  - Staff will assist patient in applying identified coping technique/skills  - Encourage attendance of scheduled groups and activities  Outcome: Adequate for Discharge     Problem: SLEEP DISTURBANCE  Goal: Will exhibit normal sleeping pattern  Description: Interventions:  -  Assess the patients sleep pattern, noting recent changes  - Administer medication as ordered  - Decrease environmental stimuli, including noise, as appropriate during the night  - Encourage the patient to actively participate in unit groups and or exercise during the day to enhance ability to achieve adequate sleep at night  - Assess the patient, in the morning, encouraging a description of sleep experience  Outcome: Adequate for Discharge     Problem: SELF CARE DEFICIT  Goal: Return ADL status to a safe level of function  Description: INTERVENTIONS:  - Administer medication as ordered  - Assess ADL deficits and provide assistive devices as needed  - Obtain PT/OT consults as needed  - Assist and instruct patient to increase activity and self care as tolerated  Outcome: Adequate for Discharge     Problem: Alteration in Thoughts and Perception  Goal: Treatment Goal: Gain control of psychotic behaviors/thinking, reduce/eliminate presenting symptoms and demonstrate improved reality functioning upon discharge  Outcome: Adequate for Discharge  Goal: Verbalize thoughts and feelings  Description: Interventions:  - Promote a nonjudgmental and trusting relationship with the patient through active listening and therapeutic communication  - Assess patient's level of functioning, behavior and potential for risk  - Engage patient in 1 on 1 interactions  - Encourage patient to express fears, feelings, frustrations, and discuss  symptoms    - [place] patient to reality, help patient recognize reality-based thinking   - Administer medications as ordered and assess for potential side effects  - Provide the patient education related to the signs and symptoms of the illness and desired effects of prescribed medications  Outcome: Adequate for Discharge  Goal: Refrain from acting on delusional thinking/internal stimuli  Description: Interventions:  - Monitor patient closely, per order   - Utilize least restrictive measures   - Set reasonable limits, give positive feedback for acceptable   - Administer medications as ordered and monitor of potential side effects  Outcome: Adequate for Discharge     Problem: INVOLUNTARY ADMIT  Goal: Will cooperate with staff recommendations and doctor's orders and will demonstrate appropriate behavior  Description: INTERVENTIONS:  - Treat underlying conditions and offer medication as ordered  - Educate regarding involuntary admission procedures and rules  - Utilize positive consistent limit setting strategies to support patient and staff safety  Outcome: Adequate for Discharge     Problem: SAFETY, RESTRAINT - VIOLENT/SELF-DESTRUCTIVE  Goal: Remains free of harm/injury from restraints (Restraint for Violent/Self-Destructive Behavior)  Description: INTERVENTIONS:  - Instruct patient/family regarding restraint use   - Assess and monitor physiologic and psychological status   - Provide interventions and comfort measures to meet assessed patient needs   - Ensure continuous in person monitoring is provided   - Identify and implement measures to help patient regain control  - Assess readiness for release of restraint  Outcome: Adequate for Discharge  Goal: Returns to optimal restraint-free functioning  Description: INTERVENTIONS:  - Assess the patient's behavior and symptoms that indicate continued need for restraint  - Identify and implement measures to help patient regain control  - Assess readiness for release of  restraint   Outcome: Adequate for Discharge     Problem: DISCHARGE PLANNING  Goal: Discharge to home or other facility with appropriate resources  Description: INTERVENTIONS:  - Identify barriers to discharge w/patient and caregiver  - Arrange for needed discharge resources and transportation as appropriate  - Identify discharge learning needs (meds, wound care, etc.)  - Arrange for interpretive services to assist at discharge as needed  - Refer to Case Management Department for coordinating discharge planning if the patient needs post-hospital services based on physician/advanced practitioner order or complex needs related to functional status, cognitive ability, or social support system  Outcome: Adequate for Discharge     Problem: Ineffective Coping  Goal: Identifies healthy coping skills  Outcome: Adequate for Discharge  Goal: Participates in unit activities  Description: Interventions:  - Provide therapeutic environment   - Provide required programming   - Redirect inappropriate behaviors   Outcome: Adequate for Discharge

## 2023-12-29 NOTE — NURSING NOTE
AVS reviewed with pt, medications sent via e-prescribe. Belongings packed by [facility] and pt verified all belongings were accounted for. Pt expressed readiness for discharge. Questions answered at this time.    Kristen spoke to family

## 2023-12-30 NOTE — PROGRESS NOTES
Status: Pt denied any SI/HI or hallucinations and has been calm & cooperative.  She appeared to have slept overnight.  She still has flu symptoms and single room ordered.   Medication: no changes / no PRNs  D/C: Today - via [employer] at 1000 /      12/29/23 0750   Team Meeting   Meeting Type Daily Rounds   Team Members Present   Team Members Present Physician;Nurse;Other (Discipline and Name);   Physician Team Member Dr. Dominguez / ANAI Mistry   Nursing Team Member Shady / Wojciech / Marivel   Care Management Team Member Jono / Jacques   Other (Discipline and Name) Ezra(group faciliator)   Patient/Family Present   Patient Present No   Patient's Family Present No

## (undated) DEVICE — GAUZE SPONGES,16 PLY: Brand: CURITY

## (undated) DEVICE — Device

## (undated) DEVICE — GLOVE INDICATOR PI UNDERGLOVE SZ 7 BLUE

## (undated) DEVICE — PACK C-SECTION PBDS

## (undated) DEVICE — CHLORAPREP HI-LITE 26ML ORANGE

## (undated) DEVICE — GLOVE INDICATOR PI UNDERGLOVE SZ 6.5 BLUE

## (undated) DEVICE — BASIC DOUBLE BASIN 2-LF: Brand: MEDLINE INDUSTRIES, INC.

## (undated) DEVICE — ADHESIVE SKN CLSR HISTOACRYL FLEX 0.5ML LF

## (undated) DEVICE — COLLECTION SET, DISPOSABLE WITH HANDLE AND TAPERED FITTINGS TUBING, 6 FT (183 CM): Brand: GYRUS ACMI

## (undated) DEVICE — GLOVE PI ULTRA TOUCH SZ.6.5

## (undated) DEVICE — D + E TUBING W /FILTER

## (undated) DEVICE — GLOVE SRG BIOGEL ECLIPSE 6.5

## (undated) DEVICE — SUT MONOCRYL 0 CTX 36 IN Y398H

## (undated) DEVICE — SKIN MARKER DUAL TIP WITH RULER CAP, FLEXIBLE RULER AND LABELS: Brand: DEVON

## (undated) DEVICE — D + E SUCTION CANISTER

## (undated) DEVICE — PERI/GYN PACK: Brand: CONVERTORS

## (undated) DEVICE — SUT VICRYL 0 CT-1 36 IN J946H

## (undated) DEVICE — D + E SAFE TOUCH TISSUE TRAP (CIRCON)

## (undated) DEVICE — SUT VICRYL 4-0 KS 27 IN J662H